# Patient Record
Sex: FEMALE | Race: WHITE | NOT HISPANIC OR LATINO | Employment: OTHER | URBAN - METROPOLITAN AREA
[De-identification: names, ages, dates, MRNs, and addresses within clinical notes are randomized per-mention and may not be internally consistent; named-entity substitution may affect disease eponyms.]

---

## 2024-07-04 ENCOUNTER — APPOINTMENT (OUTPATIENT)
Dept: RADIOLOGY | Facility: HOSPITAL | Age: 74
DRG: 963 | End: 2024-07-04
Payer: MEDICARE

## 2024-07-04 ENCOUNTER — HOSPITAL ENCOUNTER (INPATIENT)
Facility: HOSPITAL | Age: 74
DRG: 963 | End: 2024-07-04
Attending: SURGERY | Admitting: STUDENT IN AN ORGANIZED HEALTH CARE EDUCATION/TRAINING PROGRAM
Payer: MEDICARE
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
## 2024-07-04 DIAGNOSIS — I27.20 PULMONARY HTN (MULTI): ICD-10-CM

## 2024-07-04 DIAGNOSIS — S32.502A: ICD-10-CM

## 2024-07-04 DIAGNOSIS — I13.0 HYPERTENSIVE HEART AND CHRONIC KIDNEY DISEASE WITH HEART FAILURE AND STAGE 1 THROUGH STAGE 4 CHRONIC KIDNEY DISEASE, OR UNSPECIFIED CHRONIC KIDNEY DISEASE (MULTI): ICD-10-CM

## 2024-07-04 DIAGNOSIS — S06.5XAA SDH (SUBDURAL HEMATOMA) (MULTI): Primary | ICD-10-CM

## 2024-07-04 DIAGNOSIS — I50.9 HEART FAILURE, UNSPECIFIED HF CHRONICITY, UNSPECIFIED HEART FAILURE TYPE (MULTI): ICD-10-CM

## 2024-07-04 DIAGNOSIS — Z95.0 PACEMAKER: ICD-10-CM

## 2024-07-04 DIAGNOSIS — R58: ICD-10-CM

## 2024-07-04 DIAGNOSIS — R60.0 LOWER LEG EDEMA: ICD-10-CM

## 2024-07-04 LAB
ALBUMIN SERPL BCP-MCNC: 3.3 G/DL (ref 3.4–5)
ALP SERPL-CCNC: 61 U/L (ref 33–136)
ALT SERPL W P-5'-P-CCNC: 12 U/L (ref 7–45)
ANION GAP BLDV CALCULATED.4IONS-SCNC: 16 MMOL/L (ref 10–25)
ANION GAP SERPL CALC-SCNC: 17 MMOL/L (ref 10–20)
AST SERPL W P-5'-P-CCNC: 13 U/L (ref 9–39)
BASE EXCESS BLDV CALC-SCNC: -3.9 MMOL/L (ref -2–3)
BASOPHILS # BLD AUTO: 0.05 X10*3/UL (ref 0–0.1)
BASOPHILS NFR BLD AUTO: 0.2 %
BILIRUB DIRECT SERPL-MCNC: 0.4 MG/DL (ref 0–0.3)
BILIRUB SERPL-MCNC: 1.3 MG/DL (ref 0–1.2)
BODY TEMPERATURE: 37 DEGREES CELSIUS
BUN SERPL-MCNC: 30 MG/DL (ref 6–23)
CA-I BLDV-SCNC: 1.17 MMOL/L (ref 1.1–1.33)
CALCIUM SERPL-MCNC: 8.6 MG/DL (ref 8.6–10.6)
CFT FORM KAOLIN IND BLD RES TEG: 1.7 MIN (ref 0.8–2.1)
CHLORIDE BLDV-SCNC: 103 MMOL/L (ref 98–107)
CHLORIDE SERPL-SCNC: 104 MMOL/L (ref 98–107)
CK SERPL-CCNC: 20 U/L (ref 0–215)
CLOT ANGLE.KAOLIN INDUCED BLD RES TEG: 70 DEG (ref 63–78)
CLOT INIT KAO IND P HEP NEUT BLD RES TEG: 7.7 MIN (ref 4.3–8.3)
CLOT INIT KAO IND P HEP NEUT BLD RES TEG: 8.9 MIN (ref 4.6–9.1)
CO2 SERPL-SCNC: 20 MMOL/L (ref 21–32)
CREAT SERPL-MCNC: 1.59 MG/DL (ref 0.5–1.05)
EGFRCR SERPLBLD CKD-EPI 2021: 34 ML/MIN/1.73M*2
EOSINOPHIL # BLD AUTO: 0.11 X10*3/UL (ref 0–0.4)
EOSINOPHIL NFR BLD AUTO: 0.5 %
ERYTHROCYTE [DISTWIDTH] IN BLOOD BY AUTOMATED COUNT: 21.2 % (ref 11.5–14.5)
ERYTHROCYTE [DISTWIDTH] IN BLOOD BY AUTOMATED COUNT: 21.2 % (ref 11.5–14.5)
ERYTHROCYTE [DISTWIDTH] IN BLOOD BY AUTOMATED COUNT: 21.3 % (ref 11.5–14.5)
FIBRINOGEN BLD CALC-MCNC: 389 MG/DL (ref 278–581)
GLUCOSE BLD MANUAL STRIP-MCNC: 101 MG/DL (ref 74–99)
GLUCOSE BLD MANUAL STRIP-MCNC: 97 MG/DL (ref 74–99)
GLUCOSE BLDV-MCNC: 103 MG/DL (ref 74–99)
GLUCOSE SERPL-MCNC: 91 MG/DL (ref 74–99)
HCO3 BLDV-SCNC: 21.6 MMOL/L (ref 22–26)
HCT VFR BLD AUTO: 30 % (ref 36–46)
HCT VFR BLD AUTO: 30 % (ref 36–46)
HCT VFR BLD AUTO: 32.8 % (ref 36–46)
HCT VFR BLD EST: 33 % (ref 36–46)
HGB BLD-MCNC: 10.3 G/DL (ref 12–16)
HGB BLD-MCNC: 9.1 G/DL (ref 12–16)
HGB BLD-MCNC: 9.2 G/DL (ref 12–16)
HGB BLDV-MCNC: 11.1 G/DL (ref 12–16)
HOLD SPECIMEN: NORMAL
IMM GRANULOCYTES # BLD AUTO: 0.28 X10*3/UL (ref 0–0.5)
IMM GRANULOCYTES NFR BLD AUTO: 1.3 % (ref 0–0.9)
INR PPP: 1.7 (ref 0.9–1.1)
LACTATE BLDV-SCNC: 2.2 MMOL/L (ref 0.4–2)
LYMPHOCYTES # BLD AUTO: 1.34 X10*3/UL (ref 0.8–3)
LYMPHOCYTES NFR BLD AUTO: 6.1 %
MA KAOLIN BLD RES TEG: 63 MM (ref 52–69)
MA KAOLIN+TF BLD RES TEG: 64 MM (ref 52–70)
MA TF IND+IIB-IIIA INH BLD RES TEG: 21 MM (ref 15–32)
MCH RBC QN AUTO: 22.9 PG (ref 26–34)
MCH RBC QN AUTO: 23.5 PG (ref 26–34)
MCH RBC QN AUTO: 23.7 PG (ref 26–34)
MCHC RBC AUTO-ENTMCNC: 30.3 G/DL (ref 32–36)
MCHC RBC AUTO-ENTMCNC: 30.7 G/DL (ref 32–36)
MCHC RBC AUTO-ENTMCNC: 31.4 G/DL (ref 32–36)
MCV RBC AUTO: 73 FL (ref 80–100)
MCV RBC AUTO: 77 FL (ref 80–100)
MCV RBC AUTO: 78 FL (ref 80–100)
MONOCYTES # BLD AUTO: 1.3 X10*3/UL (ref 0.05–0.8)
MONOCYTES NFR BLD AUTO: 5.9 %
NEUTROPHILS # BLD AUTO: 18.89 X10*3/UL (ref 1.6–5.5)
NEUTROPHILS NFR BLD AUTO: 86 %
NRBC BLD-RTO: 0.3 /100 WBCS (ref 0–0)
OVALOCYTES BLD QL SMEAR: NORMAL
OXYHGB MFR BLDV: 32.9 % (ref 45–75)
PCO2 BLDV: 40 MM HG (ref 41–51)
PH BLDV: 7.34 PH (ref 7.33–7.43)
PLATELET # BLD AUTO: 168 X10*3/UL (ref 150–450)
PLATELET # BLD AUTO: 179 X10*3/UL (ref 150–450)
PLATELET # BLD AUTO: 229 X10*3/UL (ref 150–450)
PO2 BLDV: 25 MM HG (ref 35–45)
POTASSIUM BLDV-SCNC: 5.3 MMOL/L (ref 3.5–5.3)
POTASSIUM SERPL-SCNC: 4.9 MMOL/L (ref 3.5–5.3)
PROT SERPL-MCNC: 5.8 G/DL (ref 6.4–8.2)
PROTHROMBIN TIME: 19.8 SECONDS (ref 9.8–12.8)
RBC # BLD AUTO: 3.87 X10*6/UL (ref 4–5.2)
RBC # BLD AUTO: 3.89 X10*6/UL (ref 4–5.2)
RBC # BLD AUTO: 4.49 X10*6/UL (ref 4–5.2)
RBC MORPH BLD: NORMAL
SAO2 % BLDV: 33 % (ref 45–75)
SODIUM BLDV-SCNC: 135 MMOL/L (ref 136–145)
SODIUM SERPL-SCNC: 136 MMOL/L (ref 136–145)
WBC # BLD AUTO: 14.6 X10*3/UL (ref 4.4–11.3)
WBC # BLD AUTO: 15.3 X10*3/UL (ref 4.4–11.3)
WBC # BLD AUTO: 22 X10*3/UL (ref 4.4–11.3)

## 2024-07-04 PROCEDURE — 82947 ASSAY GLUCOSE BLOOD QUANT: CPT

## 2024-07-04 PROCEDURE — 99291 CRITICAL CARE FIRST HOUR: CPT | Performed by: STUDENT IN AN ORGANIZED HEALTH CARE EDUCATION/TRAINING PROGRAM

## 2024-07-04 PROCEDURE — 72190 X-RAY EXAM OF PELVIS: CPT

## 2024-07-04 PROCEDURE — 99223 1ST HOSP IP/OBS HIGH 75: CPT | Performed by: SURGERY

## 2024-07-04 PROCEDURE — 36600 WITHDRAWAL OF ARTERIAL BLOOD: CPT

## 2024-07-04 PROCEDURE — 85610 PROTHROMBIN TIME: CPT | Performed by: STUDENT IN AN ORGANIZED HEALTH CARE EDUCATION/TRAINING PROGRAM

## 2024-07-04 PROCEDURE — 73560 X-RAY EXAM OF KNEE 1 OR 2: CPT | Mod: RT

## 2024-07-04 PROCEDURE — 84075 ASSAY ALKALINE PHOSPHATASE: CPT | Performed by: STUDENT IN AN ORGANIZED HEALTH CARE EDUCATION/TRAINING PROGRAM

## 2024-07-04 PROCEDURE — G0390 TRAUMA RESPONS W/HOSP CRITI: HCPCS

## 2024-07-04 PROCEDURE — 2500000005 HC RX 250 GENERAL PHARMACY W/O HCPCS

## 2024-07-04 PROCEDURE — 73030 X-RAY EXAM OF SHOULDER: CPT | Mod: LT

## 2024-07-04 PROCEDURE — 3E033XZ INTRODUCTION OF VASOPRESSOR INTO PERIPHERAL VEIN, PERCUTANEOUS APPROACH: ICD-10-PCS | Performed by: HOSPITALIST

## 2024-07-04 PROCEDURE — 2020000001 HC ICU ROOM DAILY

## 2024-07-04 PROCEDURE — 85025 COMPLETE CBC W/AUTO DIFF WBC: CPT | Performed by: STUDENT IN AN ORGANIZED HEALTH CARE EDUCATION/TRAINING PROGRAM

## 2024-07-04 PROCEDURE — 2500000002 HC RX 250 W HCPCS SELF ADMINISTERED DRUGS (ALT 637 FOR MEDICARE OP, ALT 636 FOR OP/ED): Performed by: INTERNAL MEDICINE

## 2024-07-04 PROCEDURE — 70450 CT HEAD/BRAIN W/O DYE: CPT | Performed by: STUDENT IN AN ORGANIZED HEALTH CARE EDUCATION/TRAINING PROGRAM

## 2024-07-04 PROCEDURE — 72190 X-RAY EXAM OF PELVIS: CPT | Performed by: RADIOLOGY

## 2024-07-04 PROCEDURE — 82550 ASSAY OF CK (CPK): CPT | Performed by: STUDENT IN AN ORGANIZED HEALTH CARE EDUCATION/TRAINING PROGRAM

## 2024-07-04 PROCEDURE — 71045 X-RAY EXAM CHEST 1 VIEW: CPT

## 2024-07-04 PROCEDURE — 73610 X-RAY EXAM OF ANKLE: CPT | Mod: RIGHT SIDE | Performed by: RADIOLOGY

## 2024-07-04 PROCEDURE — 99223 1ST HOSP IP/OBS HIGH 75: CPT | Performed by: NEUROLOGICAL SURGERY

## 2024-07-04 PROCEDURE — 74174 CTA ABD&PLVS W/CONTRAST: CPT | Performed by: RADIOLOGY

## 2024-07-04 PROCEDURE — 84132 ASSAY OF SERUM POTASSIUM: CPT

## 2024-07-04 PROCEDURE — 2500000004 HC RX 250 GENERAL PHARMACY W/ HCPCS (ALT 636 FOR OP/ED)

## 2024-07-04 PROCEDURE — 36415 COLL VENOUS BLD VENIPUNCTURE: CPT

## 2024-07-04 PROCEDURE — 74174 CTA ABD&PLVS W/CONTRAST: CPT

## 2024-07-04 PROCEDURE — 72170 X-RAY EXAM OF PELVIS: CPT | Performed by: RADIOLOGY

## 2024-07-04 PROCEDURE — 85027 COMPLETE CBC AUTOMATED: CPT

## 2024-07-04 PROCEDURE — 5A0955A ASSISTANCE WITH RESPIRATORY VENTILATION, GREATER THAN 96 CONSECUTIVE HOURS, HIGH NASAL FLOW/VELOCITY: ICD-10-PCS | Performed by: HOSPITALIST

## 2024-07-04 PROCEDURE — 85384 FIBRINOGEN ACTIVITY: CPT

## 2024-07-04 PROCEDURE — 6360000002 HC RX 636 FACTOR: Mod: JZ

## 2024-07-04 PROCEDURE — 2500000004 HC RX 250 GENERAL PHARMACY W/ HCPCS (ALT 636 FOR OP/ED): Performed by: STUDENT IN AN ORGANIZED HEALTH CARE EDUCATION/TRAINING PROGRAM

## 2024-07-04 PROCEDURE — 71045 X-RAY EXAM CHEST 1 VIEW: CPT | Performed by: RADIOLOGY

## 2024-07-04 PROCEDURE — 36415 COLL VENOUS BLD VENIPUNCTURE: CPT | Performed by: STUDENT IN AN ORGANIZED HEALTH CARE EDUCATION/TRAINING PROGRAM

## 2024-07-04 PROCEDURE — 72170 X-RAY EXAM OF PELVIS: CPT

## 2024-07-04 PROCEDURE — 73610 X-RAY EXAM OF ANKLE: CPT | Mod: LT

## 2024-07-04 PROCEDURE — 2550000001 HC RX 255 CONTRASTS: Performed by: SURGERY

## 2024-07-04 PROCEDURE — 82248 BILIRUBIN DIRECT: CPT | Performed by: STUDENT IN AN ORGANIZED HEALTH CARE EDUCATION/TRAINING PROGRAM

## 2024-07-04 PROCEDURE — 70450 CT HEAD/BRAIN W/O DYE: CPT

## 2024-07-04 PROCEDURE — 82805 BLOOD GASES W/O2 SATURATION: CPT

## 2024-07-04 PROCEDURE — 73560 X-RAY EXAM OF KNEE 1 OR 2: CPT | Mod: RIGHT SIDE | Performed by: RADIOLOGY

## 2024-07-04 PROCEDURE — 99291 CRITICAL CARE FIRST HOUR: CPT | Performed by: SURGERY

## 2024-07-04 PROCEDURE — 99222 1ST HOSP IP/OBS MODERATE 55: CPT

## 2024-07-04 PROCEDURE — 73030 X-RAY EXAM OF SHOULDER: CPT | Mod: LEFT SIDE | Performed by: RADIOLOGY

## 2024-07-04 PROCEDURE — 73610 X-RAY EXAM OF ANKLE: CPT | Mod: LEFT SIDE | Performed by: RADIOLOGY

## 2024-07-04 PROCEDURE — 2500000001 HC RX 250 WO HCPCS SELF ADMINISTERED DRUGS (ALT 637 FOR MEDICARE OP)

## 2024-07-04 PROCEDURE — 73610 X-RAY EXAM OF ANKLE: CPT | Mod: RT

## 2024-07-04 PROCEDURE — 2500000005 HC RX 250 GENERAL PHARMACY W/O HCPCS: Performed by: STUDENT IN AN ORGANIZED HEALTH CARE EDUCATION/TRAINING PROGRAM

## 2024-07-04 RX ORDER — ONDANSETRON HYDROCHLORIDE 2 MG/ML
4 INJECTION, SOLUTION INTRAVENOUS EVERY 8 HOURS PRN
Status: DISCONTINUED | OUTPATIENT
Start: 2024-07-04 | End: 2024-07-17 | Stop reason: HOSPADM

## 2024-07-04 RX ORDER — LAMOTRIGINE 100 MG/1
100 TABLET ORAL DAILY
COMMUNITY

## 2024-07-04 RX ORDER — SILDENAFIL CITRATE 20 MG/1
10 TABLET ORAL 3 TIMES DAILY
Status: DISCONTINUED | OUTPATIENT
Start: 2024-07-04 | End: 2024-07-04

## 2024-07-04 RX ORDER — ALLOPURINOL 300 MG/1
300 TABLET ORAL DAILY
Status: ON HOLD | COMMUNITY
End: 2024-07-17

## 2024-07-04 RX ORDER — OXYCODONE HYDROCHLORIDE 5 MG/1
5 TABLET ORAL EVERY 4 HOURS PRN
Status: DISCONTINUED | OUTPATIENT
Start: 2024-07-04 | End: 2024-07-05

## 2024-07-04 RX ORDER — TORSEMIDE 20 MG/1
40 TABLET ORAL 2 TIMES DAILY
COMMUNITY
End: 2024-07-17 | Stop reason: HOSPADM

## 2024-07-04 RX ORDER — SILDENAFIL CITRATE 20 MG/1
20 TABLET ORAL 3 TIMES DAILY
COMMUNITY
End: 2024-07-17 | Stop reason: HOSPADM

## 2024-07-04 RX ORDER — LEVETIRACETAM 500 MG/1
500 TABLET ORAL 2 TIMES DAILY
Status: DISCONTINUED | OUTPATIENT
Start: 2024-07-04 | End: 2024-07-17 | Stop reason: HOSPADM

## 2024-07-04 RX ORDER — CALCIUM CARBONATE 300MG(750)
800 TABLET,CHEWABLE ORAL 2 TIMES DAILY
COMMUNITY

## 2024-07-04 RX ORDER — PANTOPRAZOLE SODIUM 40 MG/1
40 TABLET, DELAYED RELEASE ORAL
Status: DISCONTINUED | OUTPATIENT
Start: 2024-07-04 | End: 2024-07-17 | Stop reason: HOSPADM

## 2024-07-04 RX ORDER — ALPRAZOLAM 0.5 MG/1
0.5 TABLET ORAL DAILY PRN
COMMUNITY

## 2024-07-04 RX ORDER — PREDNISONE 10 MG/1
10 TABLET ORAL DAILY
COMMUNITY

## 2024-07-04 RX ORDER — LOPERAMIDE HYDROCHLORIDE 2 MG/1
4 CAPSULE ORAL 3 TIMES DAILY
COMMUNITY

## 2024-07-04 RX ORDER — ESCITALOPRAM OXALATE 10 MG/1
10 TABLET ORAL DAILY
COMMUNITY

## 2024-07-04 RX ORDER — BISACODYL 5 MG
10 TABLET, DELAYED RELEASE (ENTERIC COATED) ORAL DAILY PRN
Status: DISCONTINUED | OUTPATIENT
Start: 2024-07-04 | End: 2024-07-04

## 2024-07-04 RX ORDER — AZELASTINE 1 MG/ML
1 SPRAY, METERED NASAL 2 TIMES DAILY
COMMUNITY

## 2024-07-04 RX ORDER — HYDROMORPHONE HYDROCHLORIDE 1 MG/ML
0.2 INJECTION, SOLUTION INTRAMUSCULAR; INTRAVENOUS; SUBCUTANEOUS
Status: DISCONTINUED | OUTPATIENT
Start: 2024-07-04 | End: 2024-07-05

## 2024-07-04 RX ORDER — SODIUM CHLORIDE, SODIUM LACTATE, POTASSIUM CHLORIDE, CALCIUM CHLORIDE 600; 310; 30; 20 MG/100ML; MG/100ML; MG/100ML; MG/100ML
75 INJECTION, SOLUTION INTRAVENOUS CONTINUOUS
Status: DISCONTINUED | OUTPATIENT
Start: 2024-07-04 | End: 2024-07-05

## 2024-07-04 RX ORDER — ACETAMINOPHEN 325 MG/1
650 TABLET ORAL EVERY 6 HOURS
Status: DISCONTINUED | OUTPATIENT
Start: 2024-07-04 | End: 2024-07-05

## 2024-07-04 RX ORDER — BUSPIRONE HYDROCHLORIDE 5 MG/1
5 TABLET ORAL 2 TIMES DAILY
COMMUNITY

## 2024-07-04 RX ORDER — PREGABALIN 75 MG/1
75 CAPSULE ORAL 2 TIMES DAILY
COMMUNITY

## 2024-07-04 RX ORDER — POTASSIUM CHLORIDE 750 MG/1
40 TABLET, FILM COATED, EXTENDED RELEASE ORAL 3 TIMES DAILY
COMMUNITY

## 2024-07-04 RX ORDER — ONDANSETRON 4 MG/1
4 TABLET, FILM COATED ORAL EVERY 8 HOURS PRN
Status: DISCONTINUED | OUTPATIENT
Start: 2024-07-04 | End: 2024-07-17 | Stop reason: HOSPADM

## 2024-07-04 RX ORDER — POLYETHYLENE GLYCOL 3350 17 G/17G
17 POWDER, FOR SOLUTION ORAL DAILY
Status: DISCONTINUED | OUTPATIENT
Start: 2024-07-04 | End: 2024-07-04

## 2024-07-04 RX ORDER — PANTOPRAZOLE SODIUM 40 MG/1
40 TABLET, DELAYED RELEASE ORAL 2 TIMES DAILY
COMMUNITY

## 2024-07-04 RX ORDER — OXYCODONE HYDROCHLORIDE 5 MG/1
10 TABLET ORAL EVERY 4 HOURS PRN
Status: DISCONTINUED | OUTPATIENT
Start: 2024-07-04 | End: 2024-07-05

## 2024-07-04 RX ORDER — SILDENAFIL CITRATE 20 MG/1
20 TABLET ORAL 3 TIMES DAILY
Status: DISCONTINUED | OUTPATIENT
Start: 2024-07-04 | End: 2024-07-08

## 2024-07-04 RX ORDER — METHOCARBAMOL 500 MG/1
500 TABLET, FILM COATED ORAL 2 TIMES DAILY PRN
COMMUNITY

## 2024-07-04 RX ADMIN — WATER 4 MG/MIN: 1 INJECTION INTRAMUSCULAR; INTRAVENOUS; SUBCUTANEOUS at 22:43

## 2024-07-04 RX ADMIN — ACETAMINOPHEN 650 MG: 325 TABLET ORAL at 11:58

## 2024-07-04 RX ADMIN — Medication 6 L/MIN: at 12:18

## 2024-07-04 RX ADMIN — PANTOPRAZOLE SODIUM 40 MG: 40 TABLET, DELAYED RELEASE ORAL at 11:58

## 2024-07-04 RX ADMIN — OXYCODONE HYDROCHLORIDE 5 MG: 5 TABLET ORAL at 21:58

## 2024-07-04 RX ADMIN — ACETAMINOPHEN 650 MG: 325 TABLET ORAL at 23:52

## 2024-07-04 RX ADMIN — ONDANSETRON 4 MG: 2 INJECTION INTRAMUSCULAR; INTRAVENOUS at 20:46

## 2024-07-04 RX ADMIN — SILDENAFIL 20 MG: 20 TABLET ORAL at 16:20

## 2024-07-04 RX ADMIN — SODIUM CHLORIDE, POTASSIUM CHLORIDE, SODIUM LACTATE AND CALCIUM CHLORIDE 75 ML/HR: 600; 310; 30; 20 INJECTION, SOLUTION INTRAVENOUS at 18:19

## 2024-07-04 RX ADMIN — IOHEXOL 90 ML: 350 INJECTION, SOLUTION INTRAVENOUS at 10:35

## 2024-07-04 RX ADMIN — OXYCODONE HYDROCHLORIDE 10 MG: 5 TABLET ORAL at 13:52

## 2024-07-04 RX ADMIN — LEVETIRACETAM 500 MG: 500 TABLET, FILM COATED ORAL at 21:41

## 2024-07-04 RX ADMIN — ACETAMINOPHEN 650 MG: 325 TABLET ORAL at 18:27

## 2024-07-04 RX ADMIN — SILDENAFIL 20 MG: 20 TABLET ORAL at 20:49

## 2024-07-04 ASSESSMENT — PAIN SCALES - GENERAL
PAINLEVEL_OUTOF10: 6
PAINLEVEL_OUTOF10: 4
PAINLEVEL_OUTOF10: 0 - NO PAIN
PAINLEVEL_OUTOF10: 0 - NO PAIN
PAINLEVEL_OUTOF10: 1
PAINLEVEL_OUTOF10: 8
PAINLEVEL_OUTOF10: 5 - MODERATE PAIN

## 2024-07-04 ASSESSMENT — PAIN - FUNCTIONAL ASSESSMENT
PAIN_FUNCTIONAL_ASSESSMENT: 0-10

## 2024-07-04 ASSESSMENT — COLUMBIA-SUICIDE SEVERITY RATING SCALE - C-SSRS
2. HAVE YOU ACTUALLY HAD ANY THOUGHTS OF KILLING YOURSELF?: NO
1. IN THE PAST MONTH, HAVE YOU WISHED YOU WERE DEAD OR WISHED YOU COULD GO TO SLEEP AND NOT WAKE UP?: NO
6. HAVE YOU EVER DONE ANYTHING, STARTED TO DO ANYTHING, OR PREPARED TO DO ANYTHING TO END YOUR LIFE?: NO

## 2024-07-04 ASSESSMENT — PAIN SCALES - PAIN ASSESSMENT IN ADVANCED DEMENTIA (PAINAD): TOTALSCORE: MEDICATION (SEE MAR)

## 2024-07-04 ASSESSMENT — PAIN DESCRIPTION - LOCATION
LOCATION: HIP
LOCATION: HIP

## 2024-07-04 ASSESSMENT — PAIN DESCRIPTION - ORIENTATION
ORIENTATION: LEFT
ORIENTATION: LEFT

## 2024-07-04 NOTE — PROGRESS NOTES
Transfer via  transport from [facility] for fall. Multiple fxs. Patient fell on 330a going to the bathroom. Down for one hour before being able to reach the phone to call her friend. Aox3 upon arrival. Patient taken to CT scan and xray. SW will continue to follow for assistance with discharge planning needs.

## 2024-07-04 NOTE — PROGRESS NOTES
[facility]  TRAUMA ICU - PROGRESS NOTE    Patient Name: Bhargavi Isidro  MRN: [id number]  Admit Date: 704  : 1950                      AGE: 74 y.o.                             GENDER: female  ==============================================================================  MECHANISM OF INJURY:  Fall    LOC (yes/no)? no  Anticoagulant / Anti-platelet Rx? (for what dx?): Eliquis for paroxysmal aFib, reversed with feiba at OSH  Referring Facility Name (N/A for scene EMR run): [facility]     INJURIES:   SDH, right  L inferior pubic rami fracture     OTHER MEDICAL PROBLEMS:  Severe pulmonary HTN - on remodulin pump  CAD, underwent CABG  Paroxysmal Atrial fibrillation  COPD     INCIDENTAL FINDINGS:  Aortoiliac calcifications    PROCEDURES  None      ==============================================================================  TODAY'S ASSESSMENT AND PLAN OF CARE:  Bhargavi Isidro is a 74 y.o. female in the ICU due to respiratory support and neurological monitoring.     *Patient is refusing IV medications    NEURO/PAIN/SEDATION:    - Tylenol 650 mg PO every 6h scheduled    - Oxycodone PO PRN    - Dilaudid 0.2mg q3h PRN for breakthrough pain    - Neurosurgery consulted       - repeat CTH pending        - Andexxa for Eliquis reversal        - follow up TEG   - q1h neurochecks     RESPIRATORY:   #Pulmonary hypertension    - Sildenafil 20 mg PO TID   - Patient refusing Remodulin IV   - SpO2 goal >90%  - IS, q1h     CARDIOVASC:    - MAP goal > 60    #History of Afib on Eliquis   - Eliquis held on admission     GI:     - diet: NPO, sips with meds    - Zofran PRN for N/V    :    - Strict monitor of I/O   - martinez in place     HEMATOLOGIC:    - CBC q6h     ENDOCRINE:     - POCT glucose q6h     FEN   - LR 75mL/hr   - daily RFP and Mag   - replete electrolytes as needed     MUSCULOSKELETAL/SKIN:   #Periorbital ecchymosis from fall   - neurochecks q1h    - continue to monitor for vision  "changes    #Pubic rami fractures (superior and inferior)   - Ortho consulted, recommendations appreciated    - IR consulted with no acute intervention recommended     INFECTIOUS DISEASE:    - afebrile   #Incidental Diverticulitis with abscess managed with Vancomycin    - Vancomycin discontinued on admission     GI PROPHYLAXIS: Pantoprazole     DVT PROPHYLAXIS: SCDs, holding chemoprophylaxis in setting of bleed    DISPOSITION: remain in the TSICU     Patient discussed with Dr. Keesha Rucker PGY1  TSICU [id number]    ==============================================================================    CHIEF COMPLAINT/OVERNIGHT EVENTS/HPI  Please see trauma HPI for full details. Patient refuses IV medications. When arrived in the TSICU endorsed pain. O2 sats since being in the TSICU high 80s to low 90s.     MEDICAL HISTORY/ROS  Admission history and ROS reviewed. Pertinent changes as follows:   Please see trauma HPI for full details     PHYSICAL EXAM:  Heart Rate:  [71]   Temp:  [36.2 °C (97.2 °F)-36.9 °C (98.4 °F)]   Resp:  [19-23]   BP: ()/(48-71)   Height:  [165.1 cm (5' 5\")]   Weight:  [63 kg (139 lb)]   SpO2:  [79 %-94 %]     Physical Exam  Constitutional:       General: She is not in acute distress.  HENT:      Head:      Comments: Left sided ecchymosis (periorbital)   Cardiovascular:      Rate and Rhythm: Normal rate and regular rhythm.   Pulmonary:      Effort: Pulmonary effort is normal.      Breath sounds: Normal breath sounds.   Abdominal:      General: There is no distension.      Palpations: Abdomen is soft.      Tenderness: There is no abdominal tenderness.   Musculoskeletal:      Cervical back: Normal range of motion. No rigidity.      Comments: Able to move bilateral upper and lower extremities   Skin:     Findings: Bruising present.      Comments: Laceration L upper arm   Neurological:      Mental Status: She is alert and oriented to person, place, and time.      Comments: Follows " commands - wiggles toes and squeezes fingers bilaterally       IMAGING SUMMARY:    CXR: pulmonary edema present without consolidations, cardiac silhouette is enlarged   XR pelvis: L superior and inferior pubic rami without dislocation   CTA AP: hematoma without contrast extravasation in the Space of Retzius, thickened bladder wall ?hematoma, fluid collection inferior abdomen near sigmoid diverticulosis - possible abscess, pubic rami fracture (superior and inferior on the left), transverse process fractures of L1 and L2 on the right, brit sacral fracture on the left zone 1.  XR ankle L: no fracture or dislocation  XR ankle R: no acute findings   XR pelvis: L superior and inferior pubic rami without dislocation   XR shoulder: no acute findings   XR knee: no acute findings    CT head: right sided subdural hematoma 1.1 cm     LABS:  Results from last 7 days   Lab Units 07/04/24  0958   WBC AUTO x10*3/uL 22.0*   HEMOGLOBIN g/dL 10.3*   HEMATOCRIT % 32.8*   PLATELETS AUTO x10*3/uL 229   NEUTROS PCT AUTO % 86.0   LYMPHS PCT AUTO % 6.1   MONOS PCT AUTO % 5.9   EOS PCT AUTO % 0.5     Results from last 7 days   Lab Units 07/04/24  0958   INR  1.7*     Results from last 7 days   Lab Units 07/04/24  0958   SODIUM mmol/L 136   POTASSIUM mmol/L 4.9   CHLORIDE mmol/L 104   CO2 mmol/L 20*   BUN mg/dL 30*   CREATININE mg/dL 1.59*   CALCIUM mg/dL 8.6   PROTEIN TOTAL g/dL 5.8*   BILIRUBIN TOTAL mg/dL 1.3*   ALK PHOS U/L 61   ALT U/L 12   AST U/L 13   GLUCOSE mg/dL 91     Results from last 7 days   Lab Units 07/04/24  0958   BILIRUBIN TOTAL mg/dL 1.3*   BILIRUBIN DIRECT mg/dL 0.4*         I have reviewed all medications, laboratory results, and imaging pertinent for today's encounter.

## 2024-07-04 NOTE — ED TRIAGE NOTES
Full trauma transfer from [place]. Fall around 0330 as she was trying to go to the restroom. Pt is currently on Warfrin and has a Remodulin pump. CT shows a subdural and pelvic fx. Pt received 1 unit of RBC at outside facility due to low BP

## 2024-07-04 NOTE — SIGNIFICANT EVENT
Interventional Radiology Clinical Event Note:    IR received message RE patient Bhargavi Isidro around 10:45 on 07/04/2024. Briefly, 75yo F presented to ED as transfer from [facility] as full trauma found to have SDH and left superior and inferior pubic rami fractures and left sacral fracture with concern for possible pelvic bleed. IR contacted for consideration of embolization.    CTA images reviewed by Aguila Carrillo and Aiden, with low suspicion for active contrast extravasation in the pelvis. As the patient is hemodynamically stable without significant anemia or concerning imaging findings, intervention with embolization is not currently indicated. If there are any significant changes in the patient's clinical status, acute drop in Hb, or other signs of active hemorrhage, please page IR and will re-assess.      Malissa Hawkins MD PGY-3  Interventional Radiology  Pager [phone] or Epic Secure Chat

## 2024-07-04 NOTE — H&P
[facility]  TRAUMA SERVICE - HISTORY AND PHYSICAL / CONSULT    Patient Name: Bhargavi Isidro  MRN: [id number]  Admit Date: 704  : 1950  AGE: 74 y.o.   GENDER: female  ==============================================================================  MECHANISM OF INJURY / CHIEF COMPLAINT:   Bhargavi Isidro is a 74F whoo is coming in as a full trauma, was found down around 3am after an unwitnessed fall. Pt denies any syncopal episodes prior to the fall. Pt was then taken to [facility] where her systolic BP was in the 80s (which is close to her baseline) and she was given 1 unit pRBCs. She was also started on vancomycin upon arrival, tetanus status is unknown. Patient has remained GCS of 15 while in the trauma bay.   LOC (yes/no?): Denies  Anticoagulant / Anti-platelet Rx? (for what dx?): Xarelto for paroxysmal aFib, reversed with feiba at OSH  Referring Facility Name (N/A for scene EMR run): [facility]    INJURIES:   RSDH  L inferior pubic rami fracture    OTHER MEDICAL PROBLEMS:  Severe pulmonary HTN - on remodulin pump  CAD, underwent CABG  Paroxysmal Atrial fibrillation  COPD    INCIDENTAL FINDINGS:  Aortoiliac calcifications    ==============================================================================  ADMISSION PLAN OF CARE:  TICU for q1h neuro checks  CTH Stat per NSGY since outside images are unavailable currently  Per MICU: Keep patient on 5-6L oxygen (baseline) and keep O2Sats above 90 to prevent hypercapnia.  Consultants notified (specialty, provider name, time): IR (upon arrival), NSGY (11 AM)  Medication management per TICU team  Appreciate IR reccs.    ==============================================================================  PAST MEDICAL HISTORY:   PMH: CAD (underwent CABG), paroxysmal Afib (on xarelto at home), COPD, severe pulmonary hypertension (on Remodulin pump, MICU and PH team is following), and RSDH found on CTH at OSH. Pt has a pacemaker in place  for complete heart block.  History reviewed. No pertinent past medical history.      PSH: CABG  History reviewed. No pertinent surgical history.  FH:   No family history on file.  SOCIAL HISTORY:    Smoking: Unknown  Social History     Tobacco Use   Smoking Status Not on file   Smokeless Tobacco Not on file       Alcohol: Not asked  Social History     Substance and Sexual Activity   Alcohol Use None       Drug use: Not asked    MEDICATIONS: Xarelto, [place] pump, Torsemide (40 BID, patient reports usually taking it once daily), Sildenafil 20mg TID  Prior to Admission medications    Not on File     ALLERGIES:   Allergies   Allergen Reactions    Albuterol Unknown     a-fib    Pt. Given albuterol had bigeminy PVC's and V-Tach.    Dofetilide Unknown     Torsades    Statins-Hmg-Coa Reductase Inhibitors Unknown     leg cramps with atorvastatin, Crestor, Zocor    Sulfa (Sulfonamide Antibiotics) Unknown     Pt stated she does not think she has an allergy to sulfa drugs       REVIEW OF SYSTEMS:  Review of Systems  PHYSICAL EXAM:  PRIMARY SURVEY:  Airway  Airway is patent.     Breathing  Breathing is normal. Right breath sounds are normal. Left breath sounds are normal.     Circulation  Rate is regular.   Pulses  Radial: 2+ on the right; 2+ on the left.  Femoral: 2+ on the right; 2+ on the left.  Pedal: 2+ on the right; 2+ on the left.  Carotid: on the right; 2+ on the left.    Disability  [place] Coma Score  Eye:4   Verbal:5   Motor:6      15  Pupils  Right Pupil:   round and reactive        Left Pupil:   round and reactive           Motor Strength   strength:  5/5 on the right  5/5 on the left          Exam - eFAST  No fluid in the pericardial window    No fluid in the abdomen right upper quadrant.   Interventions:  FAST pelvis inconclusive    SECONDARY SURVEY/PHYSICAL EXAM:  Physical Exam  HENT:      Head: Abrasion present.      Comments: L forehead and cheek, periorbitally, superficial skin tear and ecchymosis.  Hemostatic.     Nose: No nasal deformity, septal deviation or laceration.   Neck:      Trachea: Trachea normal.   Cardiovascular:      Rate and Rhythm: Normal rate and regular rhythm.      Pulses:           Radial pulses are 2+ on the right side and 2+ on the left side.        Femoral pulses are 2+ on the right side and 2+ on the left side.       Dorsalis pedis pulses are 2+ on the right side and 2+ on the left side.        Posterior tibial pulses are 2+ on the right side and 2+ on the left side.   Pulmonary:      Effort: Pulmonary effort is normal.      Breath sounds: Normal breath sounds and air entry.   Chest:      Chest wall: No tenderness or crepitus.      Comments: Midline Anterior Chest Surgical Scar from prior CABG  Abdominal:      General: Abdomen is flat.      Palpations: Abdomen is soft.      Tenderness: There is abdominal tenderness in the left upper quadrant and left lower quadrant.   Musculoskeletal:      Left upper arm: Laceration present.      Comments: Ecchymosis present on bilateral R and L forearms, arms, and dorsum of hand.  Patient endorses they were present prior to her fall.   Feet:      Comments: Ecchymosis present on bilateral ankles and anterior shins.   Neurological:      Mental Status: She is alert.      GCS: GCS eye subscore is 4. GCS verbal subscore is 5. GCS motor subscore is 6.      Cranial Nerves: Cranial nerves 2-12 are intact.      Sensory: Sensation is intact.      Motor: Motor function is intact.   Psychiatric:         Attention and Perception: Attention and perception normal.         Behavior: Behavior normal. Behavior is cooperative.       IMAGING SUMMARY:  (summary of findings, not a copy of dictation)  CT Head/Face: CTH 6:00 am 7/4/24: Acute right convexity extra-axial hematoma which measures approximately 9   mm in maximum dimension and causes local mass effect but no midline   shift.  Although this has a somewhat convex appearance; this is still   overall somewhat favored  to reflect subdural hematoma with other   possibility being epidural hemorrhage.  Additionally, this has some   degree of internal low density which could reflect active bleeding.    Close imaging surveillance and neurologic exam is recommended.   - repeat head CTH  CT C-Spine: OSH read: Age-related degenerative changes which are most   significant at C3-4, C4-5 and C5-6 where lordosis and superimposed   calcified discogenic disease causes mild to moderate central canal   stenosis at C3-4 secondary to calcified central disc protrusion and   lordosis, mild to moderate possibly as much as moderate at C4-5 related   to listhesis and calcified left central protrusion, and mild to moderate   at C5-6 secondary to calcified disc bulging lordosis.   CT Chest/Abd/Pelvis: OSH: 1.  Chronic right rib fractures.  No acute rib fracture.   2.  Acute left obturator fractures with associated zone 1 left hemisacral   fracture.   3. Space of Retzius hematoma with active hemorrhage along the left   aspect of the hematoma.   4.  Left urinary bladder wall thickening, malignancy versus hematoma.    Correlate with direct visualization.   5.  Sigmoid colon diverticulitis with associated adjacent fluid   collection representing abscess.   -repeat CT C/A/P done upon arrival  CXR/PXR: Pelvic XR: no pubic symphisis widening, L inferior pubic rami fracture. CXR: pacemaker in place on left, Remodulin pump wires, no acute cardiopulmonary processes, pulmonary congestion present bilaterally, L>R.  Other(s): Ct Max Face (OSH):  No evidence of a remote fracture.  No lytic or blastic process   seen in the facial bones. L periorbital soft tissue swelling.   CT L Spine 7/4/24: Right IJ central venous line.  Left chest pacemaker.  Mild cardiomegaly.    Coronary artery calcifications.  Aortic valvular calcifications.    Surgical changes of CABG.  Intact aortic arch.  No mediastinal hematoma.    Patchy groundglass densities right upper lobe may be  infectious or   inflammatory.  No pneumothorax pleural effusion.  Multiple chronic right   rib fractures.  Median sternotomy wires.  No acute left rib fractures.     Multiple right lumbar transverse process fractures.  Acute left inferior   and superior pubic rami fractures.  Left hemisacral zone 1 fracture.    Aortoiliac calcifications.  Colonic diverticulosis.  Space of Retzius   hematoma with left aspect contrast blush coronal series 4 image 32.  Left   urinary bladder wall thickening 11 mm or hematoma.  There is sigmoid   colon fat inflammation.  Left pelvic rim-enhancing fluid collection 4.9 x   2.1 cm.     Chronic superior endplate of L2 vertebral body fracture.     LABS:  Results from last 7 days   Lab Units 07/04/24  0958   WBC AUTO x10*3/uL 22.0*   HEMOGLOBIN g/dL 10.3*   HEMATOCRIT % 32.8*   PLATELETS AUTO x10*3/uL 229     Results from last 7 days   Lab Units 07/04/24  0958   INR  1.7*     Results from last 7 days   Lab Units 07/04/24  0958   SODIUM mmol/L 136   POTASSIUM mmol/L 4.9   CHLORIDE mmol/L 104   CO2 mmol/L 20*   BUN mg/dL 30*   CREATININE mg/dL 1.59*   CALCIUM mg/dL 8.6   PROTEIN TOTAL g/dL 5.8*   BILIRUBIN TOTAL mg/dL 1.3*   ALK PHOS U/L 61   ALT U/L 12   AST U/L 13   GLUCOSE mg/dL 91     Results from last 7 days   Lab Units 07/04/24  0958   BILIRUBIN TOTAL mg/dL 1.3*   BILIRUBIN DIRECT mg/dL 0.4*           I have reviewed all laboratory and imaging results ordered/pertinent for this encounter.    I saw and evaluated the patient. I personally obtained the key and critical portions of the history and physical exam. I reviewed the resident’s documentation and discussed the patient with the resident. I agree with the resident’s medical decision making as documented in the resident’s note.    74F h/o CAD (CABG), pAF (xarelto), COPD, Pulm HTN (on continuous remodulin, 6LNC and with typical O2 sat in 80s) transferred after ground level fall with R SDH and pelvic fractures with pelvic hematoma. Feiba  for reversal. Pt briefly hypotensive at the referring hospital and given 1u pRBC. On our exam, A/B/Cs intact, HR 70s, SBP 120s, GCS 15. Abdomen soft and non-tender, pelvis stable. We had immediate access to the upper body imaging on disk but did not have access to the abdomen pelvis so this was repeated with delays and demonstrated left superior and inferior pubic rami fractures, left sacral fracture, L1 and 2 R TP fractures, and a pelvic hematoma without active extravasation. Ortho, NSG, and pulmonology consulting. Admit to ICU. Q6h cbcs for the pelvic hematoma, inlet and outlet films for the pelvic fractures, repeat CT head in 6h for TBI. She also has multiple areas of ecchymosis on the extremities which will require x-rays.    Juan Barreto MD  Trauma, Critical Care, and Acute Care Surgery  Pager: [phone]

## 2024-07-04 NOTE — ED PROCEDURE NOTE
Procedure  Critical Care    Performed by: Juan Jose Messina MD  Authorized by: Juan Jose Messina MD    Critical care provider statement:     Critical care time (minutes):  32  Comments:      Critical Care Time  Authorized and Performed by: Juan Jose Messina MD  Total critical care time: [32] minutes  Due to a high probability of clinically significant, life threatening deterioration, the patient required my highest level of preparedness to intervene emergently and I personally spent this critical care time directly and personally managing the patient. This critical care time included obtaining a history; examining the patient; pulse oximetry; ordering and review of studies; arranging urgent treatment with development of a management plan; evaluation of patient's response to treatment; frequent reassessment; and, discussions with other providers and patient/family.  This critical care time was performed to assess and manage the high probability of imminent, life-threatening deterioration that could result in multi-organ failure. It was exclusive of separately billable procedures and treating other patients and teaching time.  Please see MDM section and the rest of the note for further information on patient assessment and treatment.             Juan Jose Messina MD  07/04/24 2100

## 2024-07-04 NOTE — PROGRESS NOTES
Pharmacy Medication History Review    Bhargavi Isidro is a 74 y.o. female admitted for SDH (subdural hematoma) (Multi). Pharmacy reviewed the patient's [id number]-duzlfoxeq medications and allergies for accuracy.    The list below reflects the updated PTA list. Comments regarding how patient may be taking medications differently can be found in the Admit Orders Activity.  Prior to Admission Medications   Prescriptions Last Dose Informant Patient Reported?   ALPRAZolam (Xanax) 0.5 mg tablet Unknown Self, Other Yes   Sig: Take 1 tablet (0.5 mg) by mouth once daily as needed.   allopurinol (Zyloprim) 300 mg tablet 7/3/2024 Self, Other Yes   Sig: Take 1 tablet (300 mg) by mouth once daily.   apixaban (Eliquis) 5 mg tablet 7/3/2024 Self, Other Yes   Sig: Take 1 tablet (5 mg) by mouth 2 times a day.   azelastine (Astelin) 137 mcg (0.1 %) nasal spray Unknown Self, Other Yes   Sig: Administer 1 spray into each nostril 2 times a day. Use in each nostril as directed   escitalopram (Lexapro) 10 mg tablet 7/3/2024 Self, Other Yes   Sig: Take 1 tablet (10 mg) by mouth once daily.   lamoTRIgine (LaMICtal) 100 mg tablet 7/3/2024 Self, Other Yes   Sig: Take 1 tablet (100 mg) by mouth once daily.   loperamide (Imodium) 2 mg capsule Unknown Self, Other Yes   Sig: Take 2 capsules (4 mg) by mouth 3 times a day.   magnesium oxide (Mag-Ox) 400 mg tablet  Self, Other Yes   Sig: Take 2 tablets (800 mg) by mouth 2 times a day.   methocarbamol (Robaxin) 500 mg tablet Unknown Self, Other Yes   Sig: Take 1 tablet (500 mg) by mouth 2 times a day as needed for muscle spasms.   pantoprazole (ProtoNix) 40 mg EC tablet Unknown Self, Other Yes   Sig: Take 1 tablet (40 mg) by mouth 2 times a day. Do not crush, chew, or split.   potassium chloride CR 10 mEq ER tablet 7/3/2024 Self, Other Yes   Sig: Take 4 tablets (40 mEq) by mouth 3 times a day. Do not crush, chew, or split.   predniSONE (Deltasone) 10 mg tablet 7/3/2024 Self, Other Yes   Sig: Take 1  tablet (10 mg) by mouth once daily.   pregabalin (Lyrica) 75 mg capsule 7/3/2024  Yes   Sig: Take 1 capsule (75 mg) by mouth 2 times a day.   sildenafil (Revatio) 20 mg tablet 7/3/2024 Self, Other Yes   Sig: Take 1 tablet (20 mg) by mouth 3 times a day.   torsemide (Demadex) 20 mg tablet 7/3/2024 Self, Other Yes   Sig: Take 2 tablets (40 mg) by mouth 2 times a day.      Buspirone 5mg    Take 1 tablet by mouth 2 times a day        The list below reflects the updated allergy list. Please review each documented allergy for additional clarification and justification.  Allergies  Reviewed by Toshia Betancourt PharmD on 7/4/2024        Severity Reactions Comments    Albuterol High Unknown a-fib Pt. Given albuterol had bigeminy PVC's and V-Tach.    Dofetilide High Unknown Torsades    Statins-hmg-coa Reductase Inhibitors Not Specified Unknown leg cramps with atorvastatin, Crestor, Zocor    Sulfa (sulfonamide Antibiotics) Not Specified Unknown Pt stated she does not think she has an allergy to sulfa drugs            Patient accepts M2B at discharge. Pharmacy has been updated to [facility].    Sources used to complete the med history include   - Patient fill history  - [place]ARRS  - Patient interview (good/reliable historian)      Below are additional concerns with the patient's PTA list.  - None    Toshia Betancourt, PharmD  Transitions of Care Pharmacist  [facility]  Please reach out via Secure Chat for questions, or if no response call [employer] or [employer]

## 2024-07-04 NOTE — CONSULTS
"Inpatient consult to neurosurgery  Consult performed by: Opal Ríos MD  Consult ordered by: Juan Barreto MD        Reason For Consult  SDH    History Of Present Illness  Bhargavi Isidro is a 74 y.o. female with h/o HTN, HLD, CAD , STEMI s/p CABG, diastolic heart failure, congenital heart block s/p pacemeaker, PAH, Afib ((on eliquis, s/p reversal at OSH), CKD stage IV, chronc hypoxic resp failure, COPD (on home 5L NC), LEONEL, OA, chronic spine degen, GERD, p/w fall, CTH SDH     Patient stated that she fell around midnight last night.  Was on Eliquis, last dose 7/3 PM.  Denies headache, change in vision, nausea, vomiting.  Endorses bilateral lower extremity pain.     Imaging is not reviewed as OSH images were not pushed over to PACS    Past Medical History  She has no past medical history on file.    Surgical History  She has no past surgical history on file.     Social History  She has no history on file for tobacco use, alcohol use, and drug use.    Family History  No family history on file.     Allergies  Albuterol, Dofetilide, Statins-hmg-coa reductase inhibitors, and Sulfa (sulfonamide antibiotics)    Review of Systems   Review of systems was reviewed and otherwise negative other than what was listed in the HPI.    Physical Exam  GENERAL APPEARANCE:  No distress, alert, interactive and cooperative.   RESP: on 5L NC  CARDIOVASCULAR: Radial pulses +2 and equal.   NEURO:  NAD, A&Ox3  PERRL, EOMI, Face symmetric, Facial SILT, Palate/Tongue midline and symmetric, shoulder shrugs symmetric, hearing intact to finger rubs bilaterally  RUE 5   LUE D4 (chronic) o/w 5  RLE 4+ (pain limited)  LLE 2 (pain limited)  SILT  PSYCH: appropriate  SKIN: L periorbital ecchymosis     Last Recorded Vitals  Blood pressure (!) 104/48, pulse 71, temperature 36.9 °C (98.4 °F), temperature source Temporal, resp. rate 19, height 1.651 m (5' 5\"), weight 63 kg (139 lb), SpO2 (!) 87%.    Relevant Results  Results for orders placed or performed " during the hospital encounter of 07/04/24 (from the past 24 hour(s))   Blood Gas Venous Full Panel Unsolicited   Result Value Ref Range    POCT pH, Venous 7.34 7.33 - 7.43 pH    POCT pCO2, Venous 40 (L) 41 - 51 mm Hg    POCT pO2, Venous 25 (L) 35 - 45 mm Hg    POCT SO2, Venous 33 (L) 45 - 75 %    POCT Oxy Hemoglobin, Venous 32.9 (L) 45.0 - 75.0 %    POCT Hematocrit Calculated, Venous 33.0 (L) 36.0 - 46.0 %    POCT Sodium, Venous 135 (L) 136 - 145 mmol/L    POCT Potassium, Venous 5.3 3.5 - 5.3 mmol/L    POCT Chloride, Venous 103 98 - 107 mmol/L    POCT Ionized Calicum, Venous 1.17 1.10 - 1.33 mmol/L    POCT Glucose, Venous 103 (H) 74 - 99 mg/dL    POCT Lactate, Venous 2.2 (H) 0.4 - 2.0 mmol/L    POCT Base Excess, Venous -3.9 (L) -2.0 - 3.0 mmol/L    POCT HCO3 Calculated, Venous 21.6 (L) 22.0 - 26.0 mmol/L    POCT Hemoglobin, Venous 11.1 (L) 12.0 - 16.0 g/dL    POCT Anion Gap, Venous 16.0 10.0 - 25.0 mmol/L    Patient Temperature 37.0 degrees Celsius   CBC and Auto Differential   Result Value Ref Range    WBC 22.0 (H) 4.4 - 11.3 x10*3/uL    nRBC 0.3 (H) 0.0 - 0.0 /100 WBCs    RBC 4.49 4.00 - 5.20 x10*6/uL    Hemoglobin 10.3 (L) 12.0 - 16.0 g/dL    Hematocrit 32.8 (L) 36.0 - 46.0 %    MCV 73 (L) 80 - 100 fL    MCH 22.9 (L) 26.0 - 34.0 pg    MCHC 31.4 (L) 32.0 - 36.0 g/dL    RDW 21.2 (H) 11.5 - 14.5 %    Platelets 229 150 - 450 x10*3/uL    Neutrophils %      Immature Granulocytes %, Automated      Lymphocytes %      Monocytes %      Eosinophils %      Basophils %      Neutrophils Absolute      Lymphocytes Absolute      Monocytes Absolute      Eosinophils Absolute      Basophils Absolute     Protime-INR   Result Value Ref Range    Protime 19.8 (H) 9.8 - 12.8 seconds    INR 1.7 (H) 0.9 - 1.1   Comprehensive metabolic panel   Result Value Ref Range    Glucose 91 74 - 99 mg/dL    Sodium 136 136 - 145 mmol/L    Potassium 4.9 3.5 - 5.3 mmol/L    Chloride 104 98 - 107 mmol/L    Bicarbonate 20 (L) 21 - 32 mmol/L    Anion Gap 17  10 - 20 mmol/L    Urea Nitrogen 30 (H) 6 - 23 mg/dL    Creatinine 1.59 (H) 0.50 - 1.05 mg/dL    eGFR 34 (L) >60 mL/min/1.73m*2    Calcium 8.6 8.6 - 10.6 mg/dL    Albumin 3.3 (L) 3.4 - 5.0 g/dL    Alkaline Phosphatase 61 33 - 136 U/L    Total Protein 5.8 (L) 6.4 - 8.2 g/dL    AST 13 9 - 39 U/L    Bilirubin, Total 1.3 (H) 0.0 - 1.2 mg/dL    ALT 12 7 - 45 U/L   Creatine Kinase   Result Value Ref Range    Creatine Kinase 20 0 - 215 U/L   Bilirubin, Direct   Result Value Ref Range    Bilirubin, Direct 0.4 (H) 0.0 - 0.3 mg/dL   POCT GLUCOSE   Result Value Ref Range    POCT Glucose 97 74 - 99 mg/dL          Assessment/Plan     Bhargavi Isidro is a 74 y.o. female with h/o HTN, HLD, CAD , STEMI s/p CABG, diastolic heart failure, congenital heart block s/p pacemeaker, PAH, Afib ((on eliquis, s/p reversal at OSH), CKD stage IV, chronc hypoxic resp failure, COPD (on home 5L NC), LEONEL, OA, chronic spine degen, GERD, p/w fall, CTH SDH     Patient is with stable neuroexam.  However, will need CT head to evaluate the subdural hematoma.    Recs  Please obtain STAT CTH   CBC/RFP/coag/T&S/UA/EKG/CXR  Continue to hold AC/AP  Further recs pending above imaging    Patient is discussed with chief resident, who agrees with above assessment and plan. Note is not final until signed by attending physician.     Note authored by resident on neurosurgery team, with all questions or to contact team please page at [phone]    Opal Ríos MD  Neurosurgery, PGY-2

## 2024-07-04 NOTE — ED TRIAGE NOTES
Patient is being transferred from [facility] for services not available locally given that she was a 74-year-old on warfarin who presented with a fall but has a history of pulmonary hypertension who has a constant infusion of Remodulin.  Patient was found have a right subdural hematoma but has a GCS of 15, pelvic fractures but ring is intact.  The pelvic fracture is associated acute bleed and the patient's blood pressure is downtrending so she got 1 unit of blood.  Patient also incidentally was found of diverticulitis and got antibiotics.  The patient's remodeling pump is 48 hours of her module and remaining.  Patient is excepted as a full trauma.

## 2024-07-04 NOTE — ED PROVIDER NOTES
CC: Trauma and Fall     HPI:   Patient is a 74-year-old female coming in as a full trauma activation transfer from [facility] with past medical history of CAD, pulmonary hypertension on Remodulin and 6 L O2 requirement at baseline (sats between 79% 96% per patient), A-fib on Tikosyn and Eliquis, COPD, anxiety, complete heart block status post pacemaker presenting due to concern for pelvic bleeding, right rib fractures and subdural hemorrhage.  Patient fell from standing after she lost balance but denies any preceding lightheadedness and denies any active chest pain, shortness of breath.  She is on her baseline 6 L of oxygen at this time.  Patient did strike her head and has superficial skin tears over her left upper and lower extremity along with hemostatic left facial laceration and small abrasion over her right frontal forehead.  Her last dose of Eliquis was yesterday at 5 PM and she was reversed with Feiba.  Patient is a GCS of 15 on initial evaluation and is complaining of left-sided pain.  FAST exam was inconclusive for the pelvis but negative for the right upper quadrant, left upper quadrant and cardiac views.      Limitations to History: none  Additional History Obtained from: EMS    PMHx/PSHx:  Per HPI.   - has no past medical history on file.  - has no past surgical history on file.    Social History:  - Tobacco:  has no history on file for tobacco use.   - Alcohol:  has no history on file for alcohol use.   - Drugs:  has no history on file for drug use.     Medications: Reviewed in EMR.     Allergies:  Albuterol, Dofetilide, Statins-hmg-coa reductase inhibitors, and Sulfa (sulfonamide antibiotics)    ???????????????????????????????????????????????????????????????  Triage Vitals:  T 36.2 °C (97.2 °F)  HR 71  BP 98/54  RR    O2 (!) 79 % Supplemental oxygen    Physical Exam  Vitals and nursing note reviewed.   Constitutional:       General: She is not in acute distress.     Appearance: She is well-developed.    HENT:      Head: Normocephalic and atraumatic.      Right Ear: Tympanic membrane and external ear normal.      Left Ear: Tympanic membrane and external ear normal.      Mouth/Throat:      Mouth: Mucous membranes are dry.      Pharynx: Oropharynx is clear.   Eyes:      Conjunctiva/sclera: Conjunctivae normal.   Cardiovascular:      Rate and Rhythm: Normal rate and regular rhythm.      Pulses: Normal pulses.      Heart sounds: Normal heart sounds. No murmur heard.  Pulmonary:      Effort: Pulmonary effort is normal. No respiratory distress.      Breath sounds: Normal breath sounds.   Chest:      Chest wall: No tenderness.   Abdominal:      Palpations: Abdomen is soft.      Tenderness: There is abdominal tenderness (over lower abdomen). There is no guarding or rebound.      Comments: Remodulin pouch in place   Musculoskeletal:         General: Tenderness (over L shoulder without obvious deformity) present. No swelling.      Cervical back: No tenderness.   Skin:     General: Skin is warm and dry.      Capillary Refill: Capillary refill takes less than 2 seconds.      Findings: Bruising (Entensive bruising in different stages with new skin tears that are hemostatic on LUE and over L lateral thigh) present.   Neurological:      Mental Status: She is alert and oriented to person, place, and time.      Cranial Nerves: No cranial nerve deficit.      Sensory: No sensory deficit.      Motor: No weakness.       ????????????????????????????????????????????????    ED Course  Diagnoses as of 07/04/24 1027   SDH (subdural hematoma) (Multi)   Fall, initial encounter   Hemorrhage of pelvic artery   Pulmonary HTN (Multi)       Medical Decision Making:  Patient is a 74-year-old female with past medical history of CAD, pulmonary hypertension on Remodulin, COPD on 5 to 6 L at baseline, A-fib on Tikosyn and Eliquis, anxiety, complete heart block status post pacemaker presenting due to a fall from standing with a known subdural as well  as pelvic bleeding and remote history of right rib fractures.  Patient is hemodynamically stable and was receiving a unit of blood that was finishing up while on transport.  Patient's initial pulse ox was 79% was transition from 6 L to nonrebreather and then back to 6 L.  Pulmonary consult was placed and they recommended aiming for O2 sat greater than 90% while patient is in an acute phase.  Appreciate pulmonary assistance with placing orders for Remodulin pump.  Patient's pump has another 24 hours before it runs out.  She is currently transition back to her 6 L.  Unable to elucidate whether the patient received tetanus at [facility] prior to transfer however will need to be reassessed while on trauma service.  Patient on CTA performed to evaluate for acute bleed for possible IR intervention.  Patient was taken to the TQ shortly after in hemodynamically stable condition.  GCS continued to be 15.  Patient care was overseen by attending physician agrees with the plan and disposition.    External records reviewed: recent inpatient, clinic, and prior ED notes  Diagnostic imaging independently reviewed/interpreted by me (as reflected in MDM) includes: FAST, CTA   Social Determinants Affecting Care:  chronic complex medical conditions  Discussion of management with other providers: Attending, Trauma, MICU attending, Pulm fellow  Prescription Drug Consideration: per inpatient team  Escalation of Care: ICU    Impression:   SDH  Pelvic bleeding  Skin tears  Pulm HTN    Disposition: Admitted      Procedures ? SmartLinks last updated 7/4/2024 10:24 AM     Diana Andersen  PGY-2 Emergency Medicine  [facility]     Diana Andersen MD  Resident  07/04/24 0783

## 2024-07-04 NOTE — NURSING NOTE
Per MD Toshia Addison, pt has pulmonary hypertension and keep sats 79-90%. Toshia Lainez MD messaged RN stating that pt should be sats >90%, she had miss heard pulmonary earlier. MD Goddard stated that sats >88% is good for now. RN aware.

## 2024-07-04 NOTE — CONSULTS
Department of Medicine  Division of Pulmonary, Critical Care, and Sleep Medicine  Consult reason: Pulmonary Hypertension    History Of Present Illness  74-year-old female with past medical history of Group I/II/III Pulmonary hypertension (on Remodulin pump and Sildenafil; follows with Dr. Blackwell [facility]), Diastolic congestive heart failure, atrial fibrillation (on eliquis), congenital heart block s/p pacemaker, CAD s/p CABG, severe sleep disordered breathing (AHI 45) requiring BIPAP , mild obstructive lung disease/COPD and chronic hypoxic respiratory failure (uses 6L at baseline 24/7), who is admitted for fall, due to concern for pelvic bleeding, right rib fractures and subdural hemorrhage. Pulmonary is consulted for Pulmonary HTN.    Currently denies worsening SOB, dizziness/fainting, chest pain/pressure, palpitations, edema in extremities. Hasn't been using her BiPAP since past 1 year and uses 6L NC 24/7.    Per [facility] note (6/3/24)  - Diagnosed 2017 with a RHC, demonstrating precapillary PH with PVR of 7 COHEN.  However, had severe hypoventilation syndrome (AHI 45 on sleep study) at the time.     - Placed on several vasodilators including opsumit and tyvaso, which did not make her feel much better.  Stopped opsumit and converted to leatiris in May 2019.   - Fall of 2019, attempted to uptitrate vasodilators by adding sildenafil, which resulted in her having several hospitalizations for shortness of breath.  - Another RHC which demonstrated predominantly pulmonary venous HTN and only a small component if precapillary PH so the decision was made to focus on treating her underlying heart disease (HfPEF and afib) and sleep disorder (finally started on BIPAP in October 2019 and keep her on a very low dose of sildenafil of 10mg TID if it was making her feel better.   - Referred for lung transplant fall 2020 but after discussion with lung transplant MD Burk opted not to pursue the rest of the lung transplant work up  as she felt it was unlikely based on their discussion that she would be a candidate because of her heart disease.   - Had AV node abalation and placement of pacemaker in winter 2021. Several weeks later, had syncope.   - 2021 Admitted and had RHC, which now demonstrated predominantly precapillary PH and was started on IV remodulin and sidlenafil uptitrated to 20mg TID.   - Hospitalized for 22 days in summer 2021 for prostacyclin side effects including diarrhea leading to DEBORA and severe hypoxic respiratory failure. Was able to avoid dialysis, kidneys recovered and she discharged home on 20mg TID sildenafil and 7ng/kg/min remodulin where she remained for the remainder of  and into .   - Had repeat RHC in 2022, showed disease progression of both pre and post capillary PH     Right Heart Catheterization 10/2022 significant for RAP 20 PAP 92/45 mPAP 61 PCWP 26 CO/CI (TD) 3.1/1.74 PVR 11.9 PaSat 57 Additional Maneuvers included None   Right Heart Catheterization on 2021 on 7ng/kg/min remodulin 20mg TID sildenafil: RAP 22 RVSP 72 PAP 72/40 mPAP 51 PCWP 24 CO/CI 3.9/2.1 (TD) PaSat 45%  PVR 6.9  Right Heart Catheterization 2021 (on 10ng/kg/min remodulin, 10mg TID Sildenafil): RAP 14 RVSP 67 PAP 67/36 mPAP 46 PCWP 21 CO/CI (TD) 3.9/2.13 PVR 6.41 COHEN SvO2 56%   Right Heart Catheterization on 2021 (on no medications, sildenafil held 4 days prior): RAP 11 RVSP 66 PAP 66/37 mPAP 47 PCWP 17 CO/CI (TD) 2.7/1.46 PVR 11.11 COHEN SvO2 52%   Right Heart Catheterization  on 10/17/19 (on 10mg sildenafil Q8) significant for RAP 15mmhg, RVSP 73mmhg, PAP 73/29, mPAP 39, PCWP 24mmhg, DPmmHg, TPG 15mmhg, CO/CI: 4.0/2.22, PVR 3.75 COHEN, PaSat 63%. Weight on day of RHC 73kg (160 pounds)    Right heart catheterization (outside) significant for RAP 3, PAP 74/27 mPAP 46, PWCP 8, CO/CI 5.33/2.93 PA sat: 81%, PVR: 7.12 COHEN    Last Echo:  [facility] - 2024 2:42 PM EDT   CONCLUSIONS:  - Exam  indication: Pulmonary Artery HTN  - The left ventricle is small. There is concentric left ventricular hypertrophy.  Left ventricular systolic function is normal. EF = 55 ± 5% (2D biplane) Grade II left ventricular diastolic dysfunction.  - The right ventricle is dilated. Right ventricular systolic function is low  normal.  - The left atrial cavity is mildly dilated.  - The right atrial cavity is dilated.  - There is severe (4+) tricuspid valve regurgitation.  - Estimated right ventricular systolic pressure is 76 mmHg consistent with moderately severe pulmonary hypertension (but may be underestimated due to severe  TR). Estimated right atrial pressure is 15 mmHg based on IVC assessment.  - Exam was compared with the prior  echocardiographic exam performed on  8/9/2023. Similar findings.    Pulmonary HTN meds:  Remodulin 1 mg/mL soln 39 ng/kg/min, dosing weight 67 kg, 90,000 ng/ml, rate 42 ml/24 hours. [facility] [employer].  Sildenafil (REVATIO) 20 mg tablet Take 1 tablet by mouth three times a day.     Date Score Therapy    7/2019 12 Letairis 10mg daily    10/2019 9 Sildenafil 10mg TID    1/10/2020 10 Sildeanfil 10mg TID    07/01/20 9 Sildenafil 10mg TID    01/13/21 9 Sildenafil 10mg TID    05/05/21 14 Sildenafil 20mg TID, Remodulin 10ng/kg/min (started 4/14/2021)   01/03/22    10 Sildenafil 20mg TID remodulin 7ng/kg/min   December 14, 2022    11 Sildenafil 20mg TID remodulin 11ng/kg/min   6/2024 11 Sildenafil 20mg TID and remodulin 39ng/kg/min   Interpretation:   Low 0-6: <5% risk of one year mortality  Intermediate 7-8: 5-10% risk of one year mortality   High -9 or above: >10% risk of one year mortality     Allergies  Albuterol, Dofetilide, Statins-hmg-coa reductase inhibitors, and Sulfa (sulfonamide antibiotics)    Physical exam  Constitutional: Normal appearance.  HEENT: Normocephalic and atraumatic.  Cardiovascular: Normal rate and regular rhythm.  Pulmonary: Normal respiratory effort, bilateral clear  "breath sounds, no wheezing or rhonchi.  Musculoskeletal: No edema, no cyanosis.  Neurological: Awake, alert and oriented x3.  Psychiatric: Normal behavior, mood and affect.     Vital Signs  Visit Vitals  /54   Pulse 71   Temp 36.2 °C (97.2 °F)   SpO2 (!) 79%      No results found for: \"WBC\", \"HGB\", \"HCT\", \"MCV\", \"PLT\"   No results found for: \"GLUCOSE\", \"CALCIUM\", \"NA\", \"K\", \"CO2\", \"CL\", \"BUN\", \"CREATININE\"   No results found for: \"ALT\", \"AST\", \"GGT\", \"ALKPHOS\", \"BILITOT\"     Oxygen Therapy  SpO2: (!) 79 %  Medical Gas Therapy: Supplemental oxygen  O2 Delivery Method: Nasal cannula       Medications   Scheduled medications  [Transfer Hold] polyethylene glycol, 17 g, oral, Daily      Continuous medications     PRN medications  PRN medications: [Transfer Hold] bisacodyl, [Transfer Hold] ondansetron **OR** [Transfer Hold] ondansetron, oxygen     Chest Radiograph   CT chest w IV contrast 07/04/2024    Narrative  * * *Final Report* * *    DATE OF EXAM: Jul 4 2024  5:58AM    [facility]   [id number]  -  CT CHEST W IVCON  / ACCESSION #  [id number]    PROCEDURE REASON: Chest trauma, blunt    * * * * Physician Interpretation * * * *    EXAMINATION:  CT CHEST W IVCON, CT ABD/PEL W IVCON, CT LUMBAR SPINE W  RECON DATA -NB, CT T-SPINE W RECON DATA -NB    CLINICAL HISTORY: Chest trauma, blunt (accession [id number]), Abdominal  trauma, blunt (accession [id number]), Spine fracture, lumbar, traumatic  (accession [id number]), Spine fracture, thoracic, traumatic (accession  [id number]  Exam Date:  7/4/2024 5:58 AM  Comparison: 04/09/2021 chest CT    Contrast:   ml of Omnipaque 350  CT Radiation dose: Integrated Dose-length product (DLP) for this visit =  3074 mGy*cm  CT Dose Reduction Employed: Automated exposure control (AEC)      RESULT:  Right IJ central venous line.  Left chest pacemaker.  Mild cardiomegaly.  Coronary artery calcifications.  Aortic valvular calcifications.  Surgical changes of CABG.  Intact aortic arch.  No mediastinal " hematoma.  Patchy groundglass densities right upper lobe may be infectious or  inflammatory.  No pneumothorax pleural effusion.  Multiple chronic right  rib fractures.  Median sternotomy wires.  No acute left rib fractures.  Multiple right lumbar transverse process fractures.  Acute left inferior  and superior pubic rami fractures.  Left hemisacral zone 1 fracture.  Aortoiliac calcifications.  Colonic diverticulosis.  Space of Retzius  hematoma with left aspect contrast blush coronal series 4 image 32.  Left  urinary bladder wall thickening 11 mm or hematoma.  There is sigmoid  colon fat inflammation.  Left pelvic rim-enhancing fluid collection 4.9 x  2.1 cm.    Chronic superior endplate of L2 vertebral body fracture.    Impression  IMPRESSION:  1.  Chronic right rib fractures.  No acute rib fracture.  2.  Acute left obturator fractures with associated zone 1 left hemisacral  fracture.  3.  Space of Retzius hematoma with active hemorrhage along the left  aspect of the hematoma.  4.  Left urinary bladder wall thickening, malignancy versus hematoma.  Correlate with direct visualization.  5.  Sigmoid colon diverticulitis with associated adjacent fluid  collection representing abscess.    URGENT RESULTS  Acuity: Urgent  Communication:  Communicated with CASTRO DE LA TORRE on  7/4/2024 6:58 AM  via verbal communication.            : PSCSOPHIA  Transcribe Date/Time: Jul 4 2024  6:38A    Dictated by : STEFANI ZAIDI MD    This examination was interpreted and the report reviewed and  electronically signed by:  STEFANI ZAIDI MD on Jul 4 2024  6:59AM  EST      XR chest 1 view 07/04/2024    Narrative  * * *Final Report* * *    DATE OF EXAM: Jul 4 2024  5:51AM    FV[facility]   [id number]  -  XR CHEST 1V FRONTAL PORT  / ACCESSION #  [id number]    PROCEDURE REASON: Chest pain, nonspecific    * * * * Physician Interpretation * * * *    EXAMINATION:  CHEST RADIOGRAPH (PORTABLE SINGLE VIEW AP)    Exam Date/Time:  7/4/2024  "5:51 AM  CLINICAL HISTORY: Chest pain, nonspecific, Polytrauma, blunt, Chest trauma  MQ:  [id number]  Comparison:  02/19/2024.    RESULT:    Lines, tubes, and devices:  Right IJ approach central venous catheter  extends to the region of the SVC.  Left-sided pacemaker.    Lungs and pleura:  Mild diffuse prominence of the pulmonary vasculature  throughout both lungs.  No dense consolidation, pleural effusion, or  pneumothorax.    Cardiomediastinal silhouette:  Stable cardiomegaly.    Other:  No other concerning change from prior.    Impression  IMPRESSION:    See result.                    : PSCSOPHIA  Transcribe Date/Time: Jul 4 2024  6:32A    Dictated by : JOHN BILLINGSLEY MD    This examination was interpreted and the report reviewed and  electronically signed by:  JOHN BILLINGSLEY MD on Jul 4 2024  6:42AM  EST         Pulmonary Function Tests   Pulmonary Functions Testing Results:  No results found for: \"FEV1\", \"FVC\", \"[id number]\", \"TLC\", \"DLCO\"       Autoimmune Testing     No results found for: \"ANATITER\", \"ANAPATTRN\", \"ANACO\", \"RO\", \"LA\", \"ARNP\", \"EMPSMRNP\", \"JIB\", \"ASCL\", \"EMPINTERP\", \"NONUHFIRE\", \"CITAB\", \"ANCA\", \"ANCPA\", \"ANCTI\"     Assessment and Plan / Recommendations   Assessment/Plan   74-year-old female with past medical history of Group I/II/III Pulmonary hypertension (on Remodulin pump and Sildenafil; follows with Dr. Rosalva BARRIOS), Diastolic congestive heart failure, atrial fibrillation (on eliquis), congenital heart block s/p pacemaker, CAD s/p CABG, severe sleep disordered breathing (AHI 45) requiring BIPAP , mild obstructive lung disease/COPD and chronic hypoxic respiratory failure on 5L at baseline, who is admitted for fall, due to concern for pelvic bleeding, right rib fractures and subdural hemorrhage. Pulmonary is consulted for Pulmonary HTN.    # Group I/II/III Pulmonary Hypertension  - Follows with Dr. Rosalva BARRIOS  - Ordered Sildenafil 20mg TID  - Ordered Remodulin through PIV at this time. " However, patient is not agreeable to receiving medication via peripheral IV. She was told to use 'only' Saunders line central catheter which was placed at outside facility few weeks ago per patient. Our big concern is her mentation in setting of SDH and poor sleep apnea. Patient is on q1 neuro checks. We have explained the patient that administering the medication by herself may be unsafe given the acute and underlying medical conditions. But the patient has been adamant about using her own cassettes and premixing it on her own. Currently, patient has the capacity to make her own decisions and will be asking her friend to bring cassettes and medication from home. She is due for cassette change 7/5 evening. We recommend to continue neuro checks, and reach out to us if there is a change in her mental status, so we can continue her Pulm HTN management by giving her Remodulin through IV.    Assessment and plan was discussed with Dr. Kaur.

## 2024-07-04 NOTE — CONSULTS
Orthopaedic Surgery Consult H&P      Requesting Provider / Service:  TICU    CC: Pelvic pain/fxs    HPI: 74F (CAD, pulm HTN, Afib on Tikosyn and Eliquis, COPD, complete heart block s/p pacemaker) [facility] transfer after mGLF with a L incomplete LC1 pelvic ring injury. Also w/ SDH, R rib fxs, pelvic hematoma stable on CTA. Closed, NVI. XR/CT w/ above.     PMH:  History reviewed. No pertinent past medical history.    History reviewed. No pertinent surgical history.    Social History     Socioeconomic History    Marital status:      Spouse name: Not on file    Number of children: Not on file    Years of education: Not on file    Highest education level: Not on file   Occupational History    Not on file   Tobacco Use    Smoking status: Not on file    Smokeless tobacco: Not on file   Substance and Sexual Activity    Alcohol use: Not on file    Drug use: Not on file    Sexual activity: Not on file   Other Topics Concern    Not on file   Social History Narrative    Not on file     Social Determinants of Health     Financial Resource Strain: Medium Risk (12/12/2023)    Received from [facility]    Overall Financial Resource Strain (CARDIA)     Difficulty of Paying Living Expenses: Somewhat hard   Food Insecurity: No Food Insecurity (12/12/2023)    Received from [facility]    Hunger Vital Sign     Worried About Running Out of Food in the Last Year: Never true     Ran Out of Food in the Last Year: Never true   Transportation Needs: No Transportation Needs (12/12/2023)    Received from [facility]    PRAPARE - Transportation     Lack of Transportation (Medical): No     Lack of Transportation (Non-Medical): No   Physical Activity: Inactive (12/12/2023)    Received from [facility]    Exercise Vital Sign     Days of Exercise per Week: 0 days     Minutes of Exercise per Session: 0 min   Stress: Stress Concern Present (12/12/2023)    Received from [facility]  Occupational Health - Occupational Stress Questionnaire     Feeling of Stress : Rather much   Social Connections: Moderately Isolated (12/12/2023)    Received from [facility]    Social Connection and Isolation Panel [NHANES]     Frequency of Communication with Friends and Family: Never     Frequency of Social Gatherings with Friends and Family: Once a week     Attends Hoahaoism Services: More than 4 times per year     Active Member of Clubs or Organizations: Not on file     Attends Club or Organization Meetings: More than 4 times per year     Marital Status:    Intimate Partner Violence: Not on file   Housing Stability: Low Risk  (12/12/2023)    Received from [facility]    Housing Stability Vital Sign     Unable to Pay for Housing in the Last Year: No     Number of Places Lived in the Last Year: 1     Unstable Housing in the Last Year: No       Allergies   Allergen Reactions    Albuterol Unknown     a-fib    Pt. Given albuterol had bigeminy PVC's and V-Tach.    Dofetilide Unknown     Torsades    Statins-Hmg-Coa Reductase Inhibitors Unknown     leg cramps with atorvastatin, Crestor, Zocor    Sulfa (Sulfonamide Antibiotics) Unknown     Pt stated she does not think she has an allergy to sulfa drugs         Current Facility-Administered Medications:     acetaminophen (Tylenol) tablet 650 mg, 650 mg, oral, q6h, Hortencia Rucker MD, 650 mg at 07/04/24 1158    HYDROmorphone (Dilaudid) injection 0.2 mg, 0.2 mg, intravenous, q3h PRN, Hortencia Rucker MD    ondansetron (Zofran) tablet 4 mg, 4 mg, oral, q8h PRN **OR** ondansetron (Zofran) injection 4 mg, 4 mg, intravenous, q8h PRN, Alonso Goddard MD    oxyCODONE (Roxicodone) immediate release tablet 10 mg, 10 mg, oral, q4h PRN, Hortencia Rucker MD, 10 mg at 07/04/24 1352    oxyCODONE (Roxicodone) immediate release tablet 5 mg, 5 mg, oral, q4h PRN, Hortencia Rucker MD    oxygen (O2) therapy, , inhalation, Continuous PRN - O2/gases, Alonso Goddard  "MD, 10 L/min at 07/04/24 1435    pantoprazole (ProtoNix) EC tablet 40 mg, 40 mg, oral, Daily before breakfast, Hortencia Rucker MD, 40 mg at 07/04/24 1158    sildenafil (Revatio) tablet 20 mg, 20 mg, oral, TID, Jenny Lyle MD    treprostinil (Remodulin) 75 mcg/mL in diluent for treprostinil 100 mL Bag, 39 ng/kg/min (Order-Specific), intravenous, Continuous, Jenny Lyle MD    Family History: non-contributory      Review of Systems:   ROS  No pertinent symptoms related to systems other than those stated above      O:  @24HRVITALS@    Intake/Output Summary (Last 24 hours) at 7/4/2024 1507  Last data filed at 7/4/2024 1300  Gross per 24 hour   Intake --   Output 295 ml   Net -295 ml       Physical Exam:   /53   Pulse 71   Temp 36.9 °C (98.4 °F) (Temporal)   Resp 19   Ht 1.651 m (5' 5\")   Wt 63 kg (139 lb)   SpO2 94%   BMI 23.13 kg/m²     Constitutional: NAD  HEENT: hearing and vision grossly intact, MMM  Resp: breathing comfortably on RA  CV: extremities warm, well perfused  Neuro: alert, sensory and motor grossly intact  Psych: Appropriate mood and affect    MSK:  Bilateral Lower Extremity/Pelvis:   -Tender at site of injury   -Fires DF/PF/EHL/FHL bilaterally   -SILT in saph/sural/SPN/DPN distributions  -Foot warm, well perfused  -Palpable DP pulse, brisk cap refill      Relevant Results  Results for orders placed or performed during the hospital encounter of 07/04/24 (from the past 24 hour(s))   Blood Gas Venous Full Panel Unsolicited   Result Value Ref Range    POCT pH, Venous 7.34 7.33 - 7.43 pH    POCT pCO2, Venous 40 (L) 41 - 51 mm Hg    POCT pO2, Venous 25 (L) 35 - 45 mm Hg    POCT SO2, Venous 33 (L) 45 - 75 %    POCT Oxy Hemoglobin, Venous 32.9 (L) 45.0 - 75.0 %    POCT Hematocrit Calculated, Venous 33.0 (L) 36.0 - 46.0 %    POCT Sodium, Venous 135 (L) 136 - 145 mmol/L    POCT Potassium, Venous 5.3 3.5 - 5.3 mmol/L    POCT Chloride, Venous 103 98 - 107 mmol/L    POCT Ionized Calicum, " Venous 1.17 1.10 - 1.33 mmol/L    POCT Glucose, Venous 103 (H) 74 - 99 mg/dL    POCT Lactate, Venous 2.2 (H) 0.4 - 2.0 mmol/L    POCT Base Excess, Venous -3.9 (L) -2.0 - 3.0 mmol/L    POCT HCO3 Calculated, Venous 21.6 (L) 22.0 - 26.0 mmol/L    POCT Hemoglobin, Venous 11.1 (L) 12.0 - 16.0 g/dL    POCT Anion Gap, Venous 16.0 10.0 - 25.0 mmol/L    Patient Temperature 37.0 degrees Celsius   CBC and Auto Differential   Result Value Ref Range    WBC 22.0 (H) 4.4 - 11.3 x10*3/uL    nRBC 0.3 (H) 0.0 - 0.0 /100 WBCs    RBC 4.49 4.00 - 5.20 x10*6/uL    Hemoglobin 10.3 (L) 12.0 - 16.0 g/dL    Hematocrit 32.8 (L) 36.0 - 46.0 %    MCV 73 (L) 80 - 100 fL    MCH 22.9 (L) 26.0 - 34.0 pg    MCHC 31.4 (L) 32.0 - 36.0 g/dL    RDW 21.2 (H) 11.5 - 14.5 %    Platelets 229 150 - 450 x10*3/uL    Neutrophils % 86.0 40.0 - 80.0 %    Immature Granulocytes %, Automated 1.3 (H) 0.0 - 0.9 %    Lymphocytes % 6.1 13.0 - 44.0 %    Monocytes % 5.9 2.0 - 10.0 %    Eosinophils % 0.5 0.0 - 6.0 %    Basophils % 0.2 0.0 - 2.0 %    Neutrophils Absolute 18.89 (H) 1.60 - 5.50 x10*3/uL    Immature Granulocytes Absolute, Automated 0.28 0.00 - 0.50 x10*3/uL    Lymphocytes Absolute 1.34 0.80 - 3.00 x10*3/uL    Monocytes Absolute 1.30 (H) 0.05 - 0.80 x10*3/uL    Eosinophils Absolute 0.11 0.00 - 0.40 x10*3/uL    Basophils Absolute 0.05 0.00 - 0.10 x10*3/uL   Protime-INR   Result Value Ref Range    Protime 19.8 (H) 9.8 - 12.8 seconds    INR 1.7 (H) 0.9 - 1.1   Comprehensive metabolic panel   Result Value Ref Range    Glucose 91 74 - 99 mg/dL    Sodium 136 136 - 145 mmol/L    Potassium 4.9 3.5 - 5.3 mmol/L    Chloride 104 98 - 107 mmol/L    Bicarbonate 20 (L) 21 - 32 mmol/L    Anion Gap 17 10 - 20 mmol/L    Urea Nitrogen 30 (H) 6 - 23 mg/dL    Creatinine 1.59 (H) 0.50 - 1.05 mg/dL    eGFR 34 (L) >60 mL/min/1.73m*2    Calcium 8.6 8.6 - 10.6 mg/dL    Albumin 3.3 (L) 3.4 - 5.0 g/dL    Alkaline Phosphatase 61 33 - 136 U/L    Total Protein 5.8 (L) 6.4 - 8.2 g/dL    AST  13 9 - 39 U/L    Bilirubin, Total 1.3 (H) 0.0 - 1.2 mg/dL    ALT 12 7 - 45 U/L   Creatine Kinase   Result Value Ref Range    Creatine Kinase 20 0 - 215 U/L   Bilirubin, Direct   Result Value Ref Range    Bilirubin, Direct 0.4 (H) 0.0 - 0.3 mg/dL   Morphology   Result Value Ref Range    RBC Morphology See Below     Ovalocytes Few    Lavender Top   Result Value Ref Range    Extra Tube Hold for add-ons.    POCT GLUCOSE   Result Value Ref Range    POCT Glucose 97 74 - 99 mg/dL       CT angio abdomen pelvis w and or wo IV IV contrast    Result Date: 7/4/2024  Interpreted By:  Michael Wolfe  and Latrice Sauer STUDY: CT ANGIO ABDOMEN PELVIS W AND/OR WO IV IV CONTRAST;  7/4/2024 10:34 am   INDICATION: Signs/Symptoms:known trauma.   COMPARISON: CT abdomen and pelvis 03/09/2016   ACCESSION NUMBER(S): [id number]   ORDERING CLINICIAN: CHANNING CAGLE   TECHNIQUE: Contiguous non-contrast axial images of the abdomen and pelvis were initially obtained.   Thin-section axial images of the abdomen and pelvis were obtained in the arterial and portal venous phase after intravenous administration of 90 mL Omnipaque 350 contrast. Sagittal and coronal reformatted images were provided. MIP images and 3D reconstructions were created on an independent workstation and reviewed.   FINDINGS: VASCULATURE:   ABDOMINAL AORTA: No abdominal aortic aneurysm or dissection. Moderate abdominal aortic atherosclerosis.   ABDOMINAL and PELVIC ARTERIES: No hemodynamically significant stenosis or occlusion. There is short segment severe stenosis of the celiac trunk with preserved patency (series 504, image 108). There is moderate calcified atherosclerosis near the origin of the celiac trunk. Branches of the celiac trunk are patent without significant stenosis. The superior mesenteric artery is patent with mild atherosclerosis but no significant stenosis. The inferior mesenteric artery is patent with calcified atherosclerosis near its origin no  significant stenosis.   There is severe stenosis of the left renal artery with preserved patency (series 503, image 61). The right renal artery is patent without significant stenosis or atherosclerosis.   There is moderate atherosclerosis of the right common iliac artery. There is moderate-to-severe calcified atherosclerosis of the right internal iliac artery with moderate stenosis. The right external iliac artery is patent mild-to-moderate atherosclerosis of the origin with no significant stenosis. The right common femoral and superficial femoral arteries are patent with mild calcified atherosclerosis without significant stenosis. The right profunda artery is patent.   There is mild calcified atherosclerosis of the left common iliac artery with mild stenosis. There is moderate-to-severe calcified atherosclerosis of the left internal iliac artery with moderate stenosis (series 503, image 64). The left external iliac artery is patent with mild-to-moderate atherosclerosis. The left common femoral and superficial femoral arteries are patent with mild calcified atherosclerosis without significant stenosis. The left profunda artery is patent.   CT ABDOMEN/PELVIS:   LOWER CHEST: No acute abnormality. Postsurgical changes from median sternotomy. There is posterior dependent atelectasis. No evidence of pleural effusion, consolidation, or pneumothorax within the visualized field of the lungs. Multiple chronic right rib fractures.   ABDOMINAL WALL: No significant abnormality.   LIVER: No significant parenchymal abnormality.   BILE DUCTS: No significant intrahepatic or extrahepatic dilatation.   GALLBLADDER: No significant abnormality.   SPLEEN: No significant abnormality.   PANCREAS: No significant abnormality. Diffuse fatty atrophy of the pancreas   ADRENALS: No significant abnormality.   KIDNEYS, URETERS, BLADDER: No significant abnormality. There is likely retained contrast within the kidneys from prior exam. No evidence  of nephroureterolithiasis or hydroureteronephrosis. There is mild thickening of the anterior bladder wall that may be compatible with hematoma.   REPRODUCTIVE ORGANS: No pelvic masses.   VESSELS: (See above). No additional significant abnormality.   LYMPH NODES/RETROPERITONEUM: No enlarged lymph nodes.   BOWEL/MESENTERY/PERITONEUM: There is sigmoid diverticulosis with associated fat stranding and fluid collection adjacent measuring 5.1 x 2.4 cm (series 501, image 344). The findings could be compatible with a sigmoid diverticulitis with associated collection. The appendix is not definitively visualized but no evidence of pericecal inflammation to suggest appendicitis..   There is a 5.1 x 1.9 cm hematoma within the anterior space of Retzius without definitive evidence of contrast extravasation (series 501, image 396).   No significant ascites or free air.   OSSEOUS STRUCTURES: Acute left inferior and superior pubic rami fractures (series 503, image 66 series 503, image 45). There is also a left brit sacral zone 1 fracture. (Series 503, image 77). There are right transverse process L1 and L2 fractures. There is also a chronic L2 superior endplate compression deformity.       No acute abdominal aortic pathology or AAA. There is severe stenosis of the celiac trunk with reconstitution distally. Additional vascular findings as described above.   Space of Retzius hematoma measuring 5.1 x 1.9 cm without evidence of active contrast extravasation. There is anterior bladder wall thickening that could be compatible with hematoma.   There is sigmoid diverticulosis with associated fat stranding that is likely compatible with diverticulitis. There is a nearby fluid collection inferiorly measuring 5.1 x 2.4 cm. The sterility of the fluid collection can not be determined on this exam but given the nearby diverticulitis, abscess can not be definitively excluded.   Acute left inferior and superior pubic rami fractures, left brit sacral  zone 1 fracture, L1 and L2 right transverse process fracture.   Additional findings as described above.   I personally reviewed the images/study and I agree with the findings as stated by Camacho Pollard MD. This study was interpreted at [facility], [place], [place].   MACRO: Critical Finding:  See findings. Notification was initiated on 7/4/2024 at 1:52 pm by  Camacho Pollard.  (**-OCF-**) Instructions:    .   Signed by: Michael Wolfe 7/4/2024 2:16 PM Dictation workstation:   [id number]    CT head wo IV contrast    Result Date: 7/4/2024  Interpreted By:  Jairo Toro, STUDY: CT HEAD WO IV CONTRAST;  7/4/2024 1:18 pm   INDICATION: Signs/Symptoms:R SDH ; repeat to determine stability.   COMPARISON: 07/04/2024 at 1:20 p.m.   ACCESSION NUMBER(S): [id number]   ORDERING CLINICIAN: KARISSA ELIAS   TECHNIQUE: Noncontrast axial CT images of head were obtained with coronal and sagittal reconstructed images.   FINDINGS: BRAIN PARENCHYMA: Mild periventricular and subcortical hemispheric white matter hypodensities are most compatible with chronic small vessel ischemic disease. No acute intraparenchymal hemorrhage or parenchymal evidence of acute large territory ischemic infarct. No mass-effect. Gray-white matter distinction is preserved.   VENTRICLES and EXTRA-AXIAL SPACES: There is intravascular contrast from prior CT. There is a hyperdense subdural hematoma overlying the right cerebral convexity that is most thick at the right parietal lobe measuring 1.1 cm on non angled axial images. This is associated with a trace amount of adjacent sulcal subarachnoid hemorrhage immediately underlying the subdural. There are no prior studies available for direct comparison.   PARANASAL SINUSES/MASTOIDS:  No hemorrhage or air-fluid levels within the visualized paranasal sinuses. The mastoids are well aerated.   CALVARIUM/ORBITS:  No skull fracture. The orbits and globes are intact to the extent  visualized.   EXTRACRANIAL SOFT TISSUES: There is soft tissue swelling along the lateral aspect of the left orbit and zygomatic arch.       Acute subdural hematoma overlying the right cerebral convexity measuring up to 1.1 cm in thickness at the right parietal lobe associated with trace sulcal subarachnoid hemorrhage immediately adjacent to the subdural. There are no prior studies available for comparison in the  system.     MACRO: None.   Signed by: Jairo Toro 7/4/2024 1:53 PM Dictation workstation:   [id number]    XR pelvis 1-2 views    Result Date: 7/4/2024  Interpreted By:  Saeid Maldonado, STUDY: XR PELVIS 1-2 VIEWS; ;  7/4/2024 10:06 am   INDICATION: Signs/Symptoms:trauma.   COMPARISON: None.   ACCESSION NUMBER(S): [id number]   ORDERING CLINICIAN: KARISSA ELIAS   FINDINGS: Pelvis, single portable view There is a mildly displaced left superior and left inferior pubic rami fractures. No other fracture. No dislocation. Degenerative changes in the hips       Mildly displaced fractures through the left superior and left inferior pubic rami     MACRO: None   Signed by: Saeid Maldonado 7/4/2024 12:27 PM Dictation workstation:   [id number]    XR chest 1 view    Result Date: 7/4/2024  Interpreted By:  Saeid Maldonado, STUDY: XR CHEST 1 VIEW;  7/4/2024 10:06 am   INDICATION: Signs/Symptoms:trauma.   COMPARISON: 03/14/2016   ACCESSION NUMBER(S): [id number]   ORDERING CLINICIAN: KARISSA ELIAS   FINDINGS: Left-sided pacemaker mediastinum wires present   Right IJ central venous line with its tip over the superior cavoatrial junction   CARDIOMEDIASTINAL SILHOUETTE: There is enlargement cardiac silhouette with moderate pulmonary edema.   LUNGS: Increased perihilar lung markings seen. There is no consolidation or effusion   ABDOMEN: No remarkable upper abdominal findings.   BONES: No acute osseous changes.       1.  Moderate pulmonary edema. Enlarged cardiac silhouette       MACRO: None   Signed by: Saeid Maldonado  7/4/2024 12:26 PM Dictation workstation:   [id number]    CT thoracic spine wo IV contrast    Result Date: 7/4/2024  * * *Final Report* * * DATE OF EXAM: Jul 4 2024  5:58AM   FVC   0485  -  CT T-SPINE W RECON DATA -NB  / ACCESSION #  [id number] PROCEDURE REASON: Spine fracture, thoracic, traumatic      * * * * Physician Interpretation * * * *  EXAMINATION:  CT CHEST W IVCON, CT ABD/PEL W IVCON, CT LUMBAR SPINE W RECON DATA -NB, CT T-SPINE W RECON DATA -NB CLINICAL HISTORY: Chest trauma, blunt (accession [id number]), Abdominal trauma, blunt (accession [id number]), Spine fracture, lumbar, traumatic   (accession [id number]), Spine fracture, thoracic, traumatic (accession [id number] Exam Date:  7/4/2024 5:58 AM Comparison: 04/09/2021 chest CT Contrast:  ml of Omnipaque 350 CT Radiation dose: Integrated Dose-length product (DLP) for this visit =   3074 mGy*cm CT Dose Reduction Employed: Automated exposure control (AEC) RESULT: Right IJ central venous line.  Left chest pacemaker.  Mild cardiomegaly.   Coronary artery calcifications.  Aortic valvular calcifications.   Surgical changes of CABG.  Intact aortic arch.  No mediastinal hematoma.   Patchy groundglass densities right upper lobe may be infectious or inflammatory.  No pneumothorax pleural effusion.  Multiple chronic right rib fractures.  Median sternotomy wires.  No acute left rib fractures. Multiple right lumbar transverse process fractures.  Acute left inferior and superior pubic rami fractures.  Left hemisacral zone 1 fracture.   Aortoiliac calcifications.  Colonic diverticulosis.  Space of Retzius hematoma with left aspect contrast blush coronal series 4 image 32.  Left urinary bladder wall thickening 11 mm or hematoma.  There is sigmoid colon fat inflammation.  Left pelvic rim-enhancing fluid collection 4.9 x 2.1 cm. Chronic superior endplate of L2 vertebral body fracture.    IMPRESSION: 1.  Chronic right rib fractures.  No acute rib fracture. 2.  Acute left  obturator fractures with associated zone 1 left hemisacral fracture. 3.  Space of Retzius hematoma with active hemorrhage along the left aspect of the hematoma. 4.  Left urinary bladder wall thickening, malignancy versus hematoma.   Correlate with direct visualization. 5.  Sigmoid colon diverticulitis with associated adjacent fluid collection representing abscess. URGENT RESULTS Acuity: Urgent Communication:  Communicated with CASTRO DE LA TORRE on  7/4/2024 6:58 AM  via verbal communication. : [facility]   Transcribe Date/Time: Jul 4 2024  6:38A Dictated by : STEFANI ZAIDI MD This examination was interpreted and the report reviewed and electronically signed by: STEFANI ZAIDI MD on Jul 4 2024  6:59AM  EST    CT LUMBAR SPINE W RECON DATA    Result Date: 7/4/2024  * * *Final Report* * * DATE OF EXAM: Jul 4 2024  5:58AM   FVC   0481  -  CT LUMBAR SPINE W RECON DATA -NB  / ACCESSION #  [id number] PROCEDURE REASON: Spine fracture, lumbar, traumatic      * * * * Physician Interpretation * * * *  EXAMINATION:  CT CHEST W IVCON, CT ABD/PEL W IVCON, CT LUMBAR SPINE W RECON DATA -NB, CT T-SPINE W RECON DATA -NB CLINICAL HISTORY: Chest trauma, blunt (accession [id number]), Abdominal trauma, blunt (accession [id number]), Spine fracture, lumbar, traumatic (accession [id number]), Spine fracture, thoracic, traumatic (accession [id number] Exam Date:  7/4/2024 5:58 AM Comparison: 04/09/2021 chest CT Contrast:  ml of Omnipaque 350 CT Radiation dose: Integrated Dose-length product (DLP) for this visit =   3074 mGy*cm CT Dose Reduction Employed: Automated exposure control (AEC) RESULT: Right IJ central venous line.  Left chest pacemaker.  Mild cardiomegaly.   Coronary artery calcifications.  Aortic valvular calcifications.   Surgical changes of CABG.  Intact aortic arch.  No mediastinal hematoma.   Patchy groundglass densities right upper lobe may be infectious or inflammatory.  No pneumothorax pleural effusion.   Multiple chronic right rib fractures.  Median sternotomy wires.  No acute left rib fractures. Multiple right lumbar transverse process fractures.  Acute left inferior and superior pubic rami fractures.  Left hemisacral zone 1 fracture.   Aortoiliac calcifications.  Colonic diverticulosis.  Space of Retzius hematoma with left aspect contrast blush coronal series 4 image 32.  Left urinary bladder wall thickening 11 mm or hematoma.  There is sigmoid colon fat inflammation.  Left pelvic rim-enhancing fluid collection 4.9 x 2.1 cm. Chronic superior endplate of L2 vertebral body fracture.    IMPRESSION: 1.  Chronic right rib fractures.  No acute rib fracture. 2.  Acute left obturator fractures with associated zone 1 left hemisacral fracture. 3.  Space of Retzius hematoma with active hemorrhage along the left aspect of the hematoma. 4.  Left urinary bladder wall thickening, malignancy versus hematoma.   Correlate with direct visualization. 5.  Sigmoid colon diverticulitis with associated adjacent fluid collection representing abscess. URGENT RESULTS Acuity: Urgent Communication:  Communicated with CASTRO DE LA TORRE on  7/4/2024 6:58 AM  via verbal communication. : [facility]   Transcribe Date/Time: Jul 4 2024  6:38A Dictated by : STEFANI ZAIDI MD This examination was interpreted and the report reviewed and electronically signed by: STEFANI ZAIDI MD on Jul 4 2024  6:59AM  EST    CT abdomen pelvis w IV contrast    Result Date: 7/4/2024  * * *Final Report* * * DATE OF EXAM: Jul 4 2024  5:58AM   FVC   0530  -  CT ABD/PEL W IVCON  / ACCESSION #  [id number] PROCEDURE REASON: Abdominal trauma, blunt      * * * * Physician Interpretation * * * *  EXAMINATION:  CT CHEST W IVCON, CT ABD/PEL W IVCON, CT LUMBAR SPINE W RECON DATA -NB, CT T-SPINE W RECON DATA -NB CLINICAL HISTORY: Chest trauma, blunt (accession [id number]), Abdominal trauma, blunt (accession [id number]), Spine fracture, lumbar, traumatic  (accession [id number]), Spine fracture, thoracic, traumatic (accession [id number] Exam Date:  7/4/2024 5:58 AM Comparison: 04/09/2021 chest CT Contrast:  ml of Omnipaque 350 CT Radiation dose: Integrated Dose-length product (DLP) for this visit =   3074 mGy*cm CT Dose Reduction Employed: Automated exposure control (AEC) RESULT: Right IJ central venous line.  Left chest pacemaker.  Mild cardiomegaly.   Coronary artery calcifications.  Aortic valvular calcifications.   Surgical changes of CABG.  Intact aortic arch.  No mediastinal hematoma.   Patchy groundglass densities right upper lobe may be infectious or inflammatory.  No pneumothorax pleural effusion.  Multiple chronic right rib fractures.  Median sternotomy wires.  No acute left rib fractures. Multiple right lumbar transverse process fractures.  Acute left inferior and superior pubic rami fractures.  Left hemisacral zone 1 fracture.   Aortoiliac calcifications.  Colonic diverticulosis.  Space of Retzius hematoma with left aspect contrast blush coronal series 4 image 32.  Left urinary bladder wall thickening 11 mm or hematoma.  There is sigmoid colon fat inflammation.  Left pelvic rim-enhancing fluid collection 4.9 x 2.1 cm. Chronic superior endplate of L2 vertebral body fracture.    IMPRESSION: 1.  Chronic right rib fractures.  No acute rib fracture. 2.  Acute left obturator fractures with associated zone 1 left hemisacral fracture. 3.  Space of Retzius hematoma with active hemorrhage along the left aspect of the hematoma. 4.  Left urinary bladder wall thickening, malignancy versus hematoma.   Correlate with direct visualization. 5.  Sigmoid colon diverticulitis with associated adjacent fluid collection representing abscess. URGENT RESULTS Acuity: Urgent Communication:  Communicated with CASTRO DE LA TORRE on  7/4/2024 6:58 AM  via verbal communication. : [place]   Transcribe Date/Time: Jul 4 2024  6:38A Dictated by : STEFANI ZAIDI MD This  examination was interpreted and the report reviewed and electronically signed by: STEFANI ZAIDI MD on Jul 4 2024  6:59AM  EST    CT chest w IV contrast    Result Date: 7/4/2024  * * *Final Report* * * DATE OF EXAM: Jul 4 2024  5:58AM   FVC   0539  -  CT CHEST W IVCON  / ACCESSION #  [id number] PROCEDURE REASON: Chest trauma, blunt      * * * * Physician Interpretation * * * *  EXAMINATION:  CT CHEST W IVCON, CT ABD/PEL W IVCON, CT LUMBAR SPINE W RECON DATA -NB, CT T-SPINE W RECON DATA -NB CLINICAL HISTORY: Chest trauma, blunt (accession [id number]), Abdominal trauma, blunt (accession [id number]), Spine fracture, lumbar, traumatic (accession [id number]), Spine fracture, thoracic, traumatic (accession [id number] Exam Date:  7/4/2024 5:58 AM Comparison: 04/09/2021 chest CT Contrast:  ml of Omnipaque 350 CT Radiation dose: Integrated Dose-length product (DLP) for this visit =   3074 mGy*cm CT Dose Reduction Employed: Automated exposure control (AEC) RESULT: Right IJ central venous line.  Left chest pacemaker.  Mild cardiomegaly.   Coronary artery calcifications.  Aortic valvular calcifications.   Surgical changes of CABG.  Intact aortic arch.  No mediastinal hematoma.   Patchy groundglass densities right upper lobe may be infectious or inflammatory.  No pneumothorax pleural effusion.  Multiple chronic right rib fractures.  Median sternotomy wires.  No acute left rib fractures.   Multiple right lumbar transverse process fractures.  Acute left inferior and superior pubic rami fractures.  Left hemisacral zone 1 fracture.   Aortoiliac calcifications.  Colonic diverticulosis.  Space of Retzius hematoma with left aspect contrast blush coronal series 4 image 32.  Left urinary bladder wall thickening 11 mm or hematoma.  There is sigmoid colon fat inflammation.  Left pelvic rim-enhancing fluid collection 4.9 x 2.1 cm. Chronic superior endplate of L2 vertebral body fracture.    IMPRESSION: 1.  Chronic right rib fractures.   No acute rib fracture. 2.  Acute left obturator fractures with associated zone 1 left hemisacral fracture. 3.  Space of Retzius hematoma with active hemorrhage along the left aspect of the hematoma. 4.  Left urinary bladder wall thickening, malignancy versus hematoma.   Correlate with direct visualization. 5.  Sigmoid colon diverticulitis with associated adjacent fluid collection representing abscess. URGENT RESULTS Acuity: Urgent Communication:  Communicated with CASTRO DE LA TORRE on  7/4/2024 6:58 AM  via verbal communication. : [facility]   Transcribe Date/Time: Jul 4 2024  6:38A Dictated by : STEFANI ZAIDI MD This examination was interpreted and the report reviewed and electronically signed by: STEFANI ZAIDI MD on Jul 4 2024  6:59AM  EST    XR chest 1 view    Result Date: 7/4/2024  * * *Final Report* * * DATE OF EXAM: Jul 4 2024  5:51AM   FVX   5376  -  XR CHEST 1V FRONTAL PORT  / ACCESSION #  [id number] PROCEDURE REASON: Chest pain, nonspecific      * * * * Physician Interpretation * * * *  EXAMINATION:  CHEST RADIOGRAPH (PORTABLE SINGLE VIEW AP) Exam Date/Time:  7/4/2024 5:51 AM CLINICAL HISTORY: Chest pain, nonspecific, Polytrauma, blunt, Chest trauma MQ:  XCPR_5 Comparison:  02/19/2024. RESULT: Lines, tubes, and devices:  Right IJ approach central venous catheter extends to the region of the SVC.  Left-sided pacemaker. Lungs and pleura:  Mild diffuse prominence of the pulmonary vasculature throughout both lungs.  No dense consolidation, pleural effusion, or pneumothorax. Cardiomediastinal silhouette:  Stable cardiomegaly. Other:  No other concerning change from prior.    IMPRESSION: See result. : [facility]   Transcribe Date/Time: Jul 4 2024  6:32A Dictated by : JOHN BILLINGSLEY MD This examination was interpreted and the report reviewed and electronically signed by: JOHN BILLINGSLEY MD on Jul 4 2024  6:42AM  EST    CT maxillofacial bones wo IV contrast    Result Date: 7/4/2024  *  * *Final Report* * * DATE OF EXAM: Jul 4 2024  5:58AM   FVC   0507  -  CT FACIAL BONE/GILL WO IVCON  / ACCESSION #  [id number] PROCEDURE REASON: Facial trauma, blunt      * * * * Physician Interpretation * * * *  EXAMINATION:  CT BRAIN WO IVCON, CT CERVICAL SPINE WO IVCON, CT FACIAL BONE/GILL WO IVCON CLINICAL HISTORY: TECHNIQUE:  Serial axial unenhanced images were obtained from the  vertex to the foramen magnum.  Spiral, high resolution axial unenhanced images were obtained from the skull base to the cervicothoracic junction with sagittal and coronal planar reconstructions.  Spiral high resolution axial unenhanced images were also obtained through the facial bones with sagittal and coronal planar reconstructions. MQ:  CTBCSFBWO_3 Dose-Length Product (DLP): 3074 mGy*cm. CT Dose Reduction Employed: Automated exposure control (AEC) COMPARISON:  None. RESULT: BRAIN: Acute change:   No evidence of an acute contusion or other acute parenchymal process. Hemorrhage: As discussed below. Mass lesion / Mass effect:   There is no evidence of an intracranial mass or extraaxial fluid collection.  No significant mass effect. Chronic change:   Scattered patchy foci of low attenuation are present within supratentorial white matter which is a nonspecific finding but likely represents mild microvascular ischemia. Parenchyma:  There is mild to moderate generalized volume loss.  The brain parenchyma is otherwise within normal limits for age. Ventricles:   Ventricular enlargement concordant with the degree of parenchymal volume loss. Paranasal sinuses and skull base:  The visualized paranasal sinuses are grossly clear.  The skull base and visualized extracranial soft tissues are grossly normal. CERVICAL: Counting reference:  Craniocervical junction.   Anatomic Variants:  None. Alignment: Minimal retrolisthesis of C4 on C5 and C5 on C6.  Otherwise, some degree of nonspecific lordosis straightening Craniocervical junction:     Craniocervical junction is normal. Osseous structures/fracture:    No evidence of a lytic or blastic process in the visualized spine.  No evidence of acute or chronic fracture. Cervical soft tissues:    The paraspinal soft tissues planes are maintained. Degenerative changes: Age-related degenerative changes which are most significant at C3-4, C4-5 and C5-6 where lordosis and superimposed calcified discogenic disease causes mild to moderate central canal stenosis at C3-4 secondary to calcified central disc protrusion and lordosis, mild to moderate possibly as much as moderate at C4-5 related to listhesis and calcified left central protrusion, and mild to moderate at C5-6 secondary to calcified disc bulging lordosis.  Partially imaged small right pleural effusion.  Moderate calcification of the bilateral proximal ICA bulbs. FACIAL BONES: Soft Tissues:  Left periorbital soft tissue swelling. Facial bones:  No evidence of an acute fracture in the visualized facial bones. Orbits:  No evidence of an acute fracture.  The globes are intact.  The soft tissue planes of the orbits are maintained. Paranasal Sinuses:  The paranasal sinuses are clear. Foreign Bodies:  No evidence of radioopaque foreign bodies. Other:  No evidence of a remote fracture.  No lytic or blastic process seen in the facial bones.    IMPRESSION: Acute right convexity extra-axial hematoma which measures approximately 9 mm in maximum dimension and causes local mass effect but no midline shift.  Although this has a somewhat convex appearance; this is still overall somewhat favored to reflect subdural hematoma with other possibility being epidural hemorrhage.  Additionally, this has some degree of internal low density which could reflect active bleeding.   Close imaging surveillance and neurologic exam is recommended. No acute cervical spine or facial bone fracture.  Please note that facial bone CT is partially Limited specifically regarding the mandible  secondary to patient motion Partially imaged small right effusion. Findings discussed with Dr. SMITH at 6:30 AM Anatomic Variant:  None.  Assume 7 cervical vertebrae with counting from the craniocervical junction. : [facility]   Transcribe Date/Time: Jul 4 2024  6:11A Dictated by : RAMEZ RIVERO MD This examination was interpreted and the report reviewed and electronically signed by: RAMEZ RIVERO MD on Jul 4 2024  6:35AM  EST    CT cervical spine wo IV contrast    Result Date: 7/4/2024  * * *Final Report* * * DATE OF EXAM: Jul 4 2024  5:58AM   FVC   0505  -  CT CERVICAL SPINE WO IVCON  / ACCESSION #  [id number] PROCEDURE REASON: Spine fracture, cervical, traumatic      * * * * Physician Interpretation * * * *  EXAMINATION:  CT BRAIN WO IVCON, CT CERVICAL SPINE WO IVCON, CT FACIAL BONE/GILL WO IVCON CLINICAL HISTORY: TECHNIQUE:  Serial axial unenhanced images were obtained from the  vertex to the foramen magnum.  Spiral, high resolution axial unenhanced images   were obtained from the skull base to the cervicothoracic junction with sagittal and coronal planar reconstructions.  Spiral high resolution axial unenhanced images were also obtained through the facial bones with sagittal and coronal planar reconstructions. MQ:  CTBCSFBWO_3 Dose-Length Product (DLP): 3074 mGy*cm. CT Dose Reduction Employed: Automated exposure control (AEC) COMPARISON:  None. RESULT: BRAIN: Acute change:   No evidence of an acute contusion or other acute parenchymal process. Hemorrhage: As discussed below. Mass lesion / Mass effect:   There is no evidence of an intracranial mass or extraaxial fluid collection.  No significant mass effect. Chronic change:   Scattered patchy foci of low attenuation are present within supratentorial white matter which is a nonspecific finding but likely represents mild microvascular ischemia. Parenchyma:  There is mild to moderate generalized volume loss.  The brain parenchyma is otherwise  within normal limits for age. Ventricles:   Ventricular enlargement concordant with the degree of parenchymal volume loss. Paranasal sinuses and skull base:  The visualized paranasal sinuses are grossly clear.  The skull base and visualized extracranial soft tissues are grossly normal. CERVICAL: Counting reference:  Craniocervical junction.   Anatomic Variants:  None. Alignment: Minimal retrolisthesis of C4 on C5 and C5 on C6.  Otherwise, some degree of nonspecific lordosis straightening Craniocervical junction:    Craniocervical junction is normal. Osseous structures/fracture:    No evidence of a lytic or blastic process in the visualized spine.  No evidence of acute or chronic fracture. Cervical soft tissues:    The paraspinal soft tissues planes are maintained. Degenerative changes: Age-related degenerative changes which are most significant at C3-4, C4-5 and C5-6 where lordosis and superimposed calcified discogenic disease causes mild to moderate central canal stenosis at C3-4 secondary to calcified central disc protrusion and lordosis, mild to moderate possibly as much as moderate at C4-5 related to listhesis and calcified left central protrusion, and mild to moderate at C5-6 secondary to calcified disc bulging lordosis. Partially imaged small right pleural effusion.  Moderate calcification of the bilateral proximal ICA bulbs. FACIAL BONES: Soft Tissues:  Left periorbital soft tissue swelling. Facial bones:  No evidence of an acute fracture in the visualized facial bones. Orbits:  No evidence of an acute fracture.  The globes are intact.  The soft tissue planes of the orbits are maintained. Paranasal Sinuses:  The paranasal sinuses are clear. Foreign Bodies:  No evidence of radioopaque foreign bodies. Other:  No evidence of a remote fracture.  No lytic or blastic process seen in the facial bones.    IMPRESSION: Acute right convexity extra-axial hematoma which measures approximately 9 mm in maximum dimension and  causes local mass effect but no midline shift.  Although this has a somewhat convex appearance; this is still overall somewhat favored to reflect subdural hematoma with other possibility being epidural hemorrhage.  Additionally, this has some degree of internal low density which could reflect active bleeding.   Close imaging surveillance and neurologic exam is recommended. No acute cervical spine or facial bone fracture.  Please note that facial bone CT is partially Limited specifically regarding the mandible secondary to patient motion Partially imaged small right effusion. Findings discussed with Dr. SMITH at 6:30 AM Anatomic Variant:  None.  Assume 7 cervical vertebrae with counting from the craniocervical junction. : [facility]   Transcribe Date/Time: Jul 4 2024  6:11A Dictated by : RAMEZ RIVERO MD This examination was interpreted and the report reviewed and electronically signed by: RAMEZ RIVERO MD on Jul 4 2024  6:35AM  EST    CT head wo IV contrast    Result Date: 7/4/2024  * * *Final Report* * * DATE OF EXAM: Jul 4 2024  5:58AM   FVC   0504  -  CT BRAIN WO IVCON  / ACCESSION #  [id number] PROCEDURE REASON: Head trauma, moderate-severe      * * * * Physician Interpretation * * * *  EXAMINATION:  CT BRAIN WO IVCON, CT CERVICAL SPINE WO IVCON, CT FACIAL BONE/GILL WO IVCON CLINICAL HISTORY: TECHNIQUE:  Serial axial unenhanced images were obtained from the  vertex to the foramen magnum.  Spiral, high resolution axial unenhanced images were obtained from the skull base to the cervicothoracic junction with sagittal and coronal planar reconstructions.  Spiral high resolution axial unenhanced images were also obtained through the facial bones with sagittal and coronal planar reconstructions. MQ:  CTBCSFBWO_3 Dose-Length Product (DLP): 3074 mGy*cm. CT Dose Reduction Employed: Automated exposure control (AEC) COMPARISON:  None. RESULT: BRAIN: Acute change:   No evidence of an acute contusion or  other acute parenchymal process. Hemorrhage: As discussed below. Mass lesion / Mass effect:   There is no evidence of an intracranial mass or extraaxial fluid collection.  No significant mass effect. Chronic change:   Scattered patchy foci of low attenuation are present within supratentorial white matter which is a nonspecific finding but likely represents mild microvascular ischemia. Parenchyma:  There is mild to moderate generalized volume loss.  The brain parenchyma is otherwise within normal limits for age.   Ventricles:   Ventricular enlargement concordant with the degree of parenchymal volume loss. Paranasal sinuses and skull base:  The visualized paranasal sinuses are grossly clear.  The skull base and visualized extracranial soft tissues are grossly normal. CERVICAL: Counting reference:  Craniocervical junction.   Anatomic Variants:  None. Alignment: Minimal retrolisthesis of C4 on C5 and C5 on C6.  Otherwise, some degree of nonspecific lordosis straightening Craniocervical junction:    Craniocervical junction is normal. Osseous structures/fracture:    No evidence of a lytic or blastic process in the visualized spine.  No evidence of acute or chronic fracture. Cervical soft tissues:    The paraspinal soft tissues planes are maintained. Degenerative changes: Age-related degenerative changes which are most significant at C3-4, C4-5 and C5-6 where lordosis and superimposed calcified discogenic disease causes mild to moderate central canal stenosis at C3-4 secondary to calcified central disc protrusion and lordosis, mild to moderate possibly as much as moderate at C4-5 related to listhesis and calcified left central protrusion, and mild to moderate at C5-6 secondary to calcified disc bulging lordosis. Partially imaged small right pleural effusion.  Moderate calcification of the bilateral proximal ICA bulbs. FACIAL BONES: Soft Tissues:  Left periorbital soft tissue swelling. Facial bones:  No evidence of an acute  fracture in the visualized facial bones. Orbits:  No evidence of an acute fracture.  The globes are intact.  The soft tissue planes of the orbits are maintained. Paranasal Sinuses:  The paranasal sinuses are clear. Foreign Bodies:  No evidence of radioopaque foreign bodies. Other:  No evidence of a remote fracture.  No lytic or blastic process seen in the facial bones.    IMPRESSION: Acute right convexity extra-axial hematoma which measures approximately 9 mm in maximum dimension and causes local mass effect but no midline shift.  Although this has a somewhat convex appearance; this is still overall somewhat favored to reflect subdural hematoma with other possibility being epidural hemorrhage.  Additionally, this has some degree of internal low density which could reflect active bleeding.   Close imaging surveillance and neurologic exam is recommended. No acute cervical spine or facial bone fracture.  Please note that facial bone CT is partially Limited specifically regarding the mandible secondary to patient motion Partially imaged small right effusion. Findings discussed with Dr. SMITH at 6:30 AM Anatomic Variant:  None.  Assume 7 cervical vertebrae with counting from the craniocervical junction. : [place]   Transcribe Date/Time: Jul 4 2024  6:11A Dictated by : RAMEZ RIVERO MD This examination was interpreted and the report reviewed and electronically signed by: RAMEZ RIVERO MD on Jul 4 2024  6:35AM  EST    XR pelvis 1-2 views    Result Date: 7/4/2024  * * *Final Report* * * DATE OF EXAM: Jul 4 2024  5:51AM   FVX   5239  -  XR PELVIS 1V AP  / ACCESSION #  [id number] PROCEDURE REASON: Pelvic trauma      * * * * Physician Interpretation * * * *  EXAM: XR PELVIS 1V AP PATIENT HISTORY:  Pelvic trauma TECHNIQUE:  AP view of the pelvis was obtained. COMPARISON:  No relevant prior available FINDINGS: Acute left parasymphyseal fracture with mild displacement. The right and left femoral head are  normally located.  Complete loss of joint space at the left hip.    IMPRESSION: 1.  Acute left parasymphyseal fracture. 2.  Severe left hip osteoarthritis. : [facility]   Transcribe Date/Time: Jul 4 2024  6:32A Dictated by : JOHN PAUL MD This examination was interpreted and the report reviewed and electronically signed by: JOHN PAUL MD on Jul 4 2024  6:35AM  EST      Imaging:   L incomplete LC1 pelvic ring injury      A/P: 74F (CAD, pulm HTN, Afib on Tikosyn and Eliquis, COPD, complete heart block s/p pacemaker) [facility] transfer after mGLF with a L incomplete LC1 pelvic ring injury. Also w/ SDH, R rib fxs, pelvic hematoma stable on CTA. Closed, NVI. XR/CT w/ above. If patient fails trial of ambulation in the coming days, may be a candidate for operative intervention for pelvic pain relief.     - No acute orthopedic intervention  - WB: WBAT LLE  - Abx: No indication from ortho perspective  - Diet: Per TICU  - DVT: Per TICU    - Dispo: F/u OP w/ Dr. Jaffe in 2 weeks    Discussed with attending on call, Dr. Jaffe.    Please do not hesitate to page with additional questions or concerns.    ------------------------------------------------------    Domingo Quinones MD  Orthopaedic Surgery, PGY-2  Available by Epic Chat  07/04/24  3:07 PM    This patient will be followed by Ortho Trauma team (All chat preferred):     1st call: Konrad Butterfield, PGY-1  1st call: David Silva, PGY-1  2nd call: David Correia PGY-2  3rd call: Domingo Quinones PGY-3        After 5 pm and on weekends, please page the on-call resident ([phone]) with questions or concerns.      07/04/24  This consult was seen and staffed within 30 minutes of the initial consult.

## 2024-07-05 ENCOUNTER — APPOINTMENT (OUTPATIENT)
Dept: CARDIOLOGY | Facility: HOSPITAL | Age: 74
DRG: 963 | End: 2024-07-05
Payer: MEDICARE

## 2024-07-05 ENCOUNTER — APPOINTMENT (OUTPATIENT)
Dept: RADIOLOGY | Facility: HOSPITAL | Age: 74
DRG: 963 | End: 2024-07-05
Payer: MEDICARE

## 2024-07-05 LAB
ALBUMIN SERPL BCP-MCNC: 2.7 G/DL (ref 3.4–5)
ALBUMIN SERPL BCP-MCNC: 3 G/DL (ref 3.4–5)
ALBUMIN SERPL BCP-MCNC: 3.1 G/DL (ref 3.4–5)
ALP SERPL-CCNC: 83 U/L (ref 33–136)
ALT SERPL W P-5'-P-CCNC: 10 U/L (ref 7–45)
ANION GAP BLDA CALCULATED.4IONS-SCNC: 12 MMO/L (ref 10–25)
ANION GAP BLDV CALCULATED.4IONS-SCNC: 14 MMOL/L (ref 10–25)
ANION GAP SERPL CALC-SCNC: 13 MMOL/L (ref 10–20)
ANION GAP SERPL CALC-SCNC: 16 MMOL/L (ref 10–20)
ANION GAP SERPL CALC-SCNC: 18 MMOL/L (ref 10–20)
APPEARANCE UR: ABNORMAL
APTT PPP: 31 SECONDS (ref 27–38)
AST SERPL W P-5'-P-CCNC: 10 U/L (ref 9–39)
ATRIAL RATE: 441 BPM
BASE EXCESS BLDA CALC-SCNC: -5.1 MMOL/L (ref -2–3)
BASE EXCESS BLDV CALC-SCNC: -6.5 MMOL/L (ref -2–3)
BASOPHILS # BLD AUTO: 0.06 X10*3/UL (ref 0–0.1)
BASOPHILS NFR BLD AUTO: 0.4 %
BILIRUB SERPL-MCNC: 0.9 MG/DL (ref 0–1.2)
BILIRUB UR STRIP.AUTO-MCNC: NEGATIVE MG/DL
BODY TEMPERATURE: 37 DEGREES CELSIUS
BODY TEMPERATURE: 37 DEGREES CELSIUS
BUN SERPL-MCNC: 32 MG/DL (ref 6–23)
BUN SERPL-MCNC: 33 MG/DL (ref 6–23)
BUN SERPL-MCNC: 35 MG/DL (ref 6–23)
BURR CELLS BLD QL SMEAR: NORMAL
CA-I BLDA-SCNC: 1.21 MMOL/L (ref 1.1–1.33)
CA-I BLDV-SCNC: 1.17 MMOL/L (ref 1.1–1.33)
CALCIUM SERPL-MCNC: 7.9 MG/DL (ref 8.6–10.6)
CALCIUM SERPL-MCNC: 8.3 MG/DL (ref 8.6–10.6)
CALCIUM SERPL-MCNC: 8.4 MG/DL (ref 8.6–10.6)
CHLORIDE BLDA-SCNC: 101 MMOL/L (ref 98–107)
CHLORIDE BLDV-SCNC: 100 MMOL/L (ref 98–107)
CHLORIDE SERPL-SCNC: 100 MMOL/L (ref 98–107)
CHLORIDE SERPL-SCNC: 103 MMOL/L (ref 98–107)
CHLORIDE SERPL-SCNC: 97 MMOL/L (ref 98–107)
CHLORIDE UR-SCNC: <15 MMOL/L
CHLORIDE/CREATININE (MMOL/G) IN URINE: NORMAL
CO2 SERPL-SCNC: 19 MMOL/L (ref 21–32)
CO2 SERPL-SCNC: 20 MMOL/L (ref 21–32)
CO2 SERPL-SCNC: 22 MMOL/L (ref 21–32)
COLOR UR: ABNORMAL
CREAT SERPL-MCNC: 1.98 MG/DL (ref 0.5–1.05)
CREAT SERPL-MCNC: 2.51 MG/DL (ref 0.5–1.05)
CREAT SERPL-MCNC: 2.82 MG/DL (ref 0.5–1.05)
CREAT UR-MCNC: 67.7 MG/DL (ref 20–320)
EGFRCR SERPLBLD CKD-EPI 2021: 17 ML/MIN/1.73M*2
EGFRCR SERPLBLD CKD-EPI 2021: 20 ML/MIN/1.73M*2
EGFRCR SERPLBLD CKD-EPI 2021: 26 ML/MIN/1.73M*2
EOSINOPHIL # BLD AUTO: 0.04 X10*3/UL (ref 0–0.4)
EOSINOPHIL NFR BLD AUTO: 0.2 %
ERYTHROCYTE [DISTWIDTH] IN BLOOD BY AUTOMATED COUNT: 21 % (ref 11.5–14.5)
ERYTHROCYTE [DISTWIDTH] IN BLOOD BY AUTOMATED COUNT: 21.4 % (ref 11.5–14.5)
GLUCOSE BLD MANUAL STRIP-MCNC: 102 MG/DL (ref 74–99)
GLUCOSE BLD MANUAL STRIP-MCNC: 112 MG/DL (ref 74–99)
GLUCOSE BLD MANUAL STRIP-MCNC: 132 MG/DL (ref 74–99)
GLUCOSE BLD MANUAL STRIP-MCNC: 152 MG/DL (ref 74–99)
GLUCOSE BLDA-MCNC: 133 MG/DL (ref 74–99)
GLUCOSE BLDV-MCNC: 123 MG/DL (ref 74–99)
GLUCOSE SERPL-MCNC: 123 MG/DL (ref 74–99)
GLUCOSE SERPL-MCNC: 132 MG/DL (ref 74–99)
GLUCOSE SERPL-MCNC: 94 MG/DL (ref 74–99)
GLUCOSE UR STRIP.AUTO-MCNC: NORMAL MG/DL
HCO3 BLDA-SCNC: 19.8 MMOL/L (ref 22–26)
HCO3 BLDV-SCNC: 19.7 MMOL/L (ref 22–26)
HCT VFR BLD AUTO: 29.7 % (ref 36–46)
HCT VFR BLD AUTO: 30.6 % (ref 36–46)
HCT VFR BLD EST: 29 % (ref 36–46)
HCT VFR BLD EST: 30 % (ref 36–46)
HGB BLD-MCNC: 9.4 G/DL (ref 12–16)
HGB BLD-MCNC: 9.6 G/DL (ref 12–16)
HGB BLDA-MCNC: 9.5 G/DL (ref 12–16)
HGB BLDV-MCNC: 10.1 G/DL (ref 12–16)
HYALINE CASTS #/AREA URNS AUTO: ABNORMAL /LPF
IMM GRANULOCYTES # BLD AUTO: 0.21 X10*3/UL (ref 0–0.5)
IMM GRANULOCYTES NFR BLD AUTO: 1.3 % (ref 0–0.9)
INHALED O2 CONCENTRATION: 100 %
INHALED O2 CONCENTRATION: 40 %
INR PPP: 1.6 (ref 0.9–1.1)
KETONES UR STRIP.AUTO-MCNC: NEGATIVE MG/DL
LACTATE BLDA-SCNC: 1 MMOL/L (ref 0.4–2)
LACTATE BLDV-SCNC: 1.6 MMOL/L (ref 0.4–2)
LEUKOCYTE ESTERASE UR QL STRIP.AUTO: ABNORMAL
LYMPHOCYTES # BLD AUTO: 0.53 X10*3/UL (ref 0.8–3)
LYMPHOCYTES NFR BLD AUTO: 3.2 %
MAGNESIUM SERPL-MCNC: 1.5 MG/DL (ref 1.6–2.4)
MAGNESIUM SERPL-MCNC: 2.08 MG/DL (ref 1.6–2.4)
MCH RBC QN AUTO: 23.2 PG (ref 26–34)
MCH RBC QN AUTO: 23.6 PG (ref 26–34)
MCHC RBC AUTO-ENTMCNC: 31.4 G/DL (ref 32–36)
MCHC RBC AUTO-ENTMCNC: 31.6 G/DL (ref 32–36)
MCV RBC AUTO: 73 FL (ref 80–100)
MCV RBC AUTO: 75 FL (ref 80–100)
MIXED CELL CASTS #/AREA UR COMP ASSIST: ABNORMAL /LPF
MONOCYTES # BLD AUTO: 0.71 X10*3/UL (ref 0.05–0.8)
MONOCYTES NFR BLD AUTO: 4.2 %
MUCOUS THREADS #/AREA URNS AUTO: ABNORMAL /LPF
NEUTROPHILS # BLD AUTO: 15.22 X10*3/UL (ref 1.6–5.5)
NEUTROPHILS NFR BLD AUTO: 90.7 %
NITRITE UR QL STRIP.AUTO: NEGATIVE
NRBC BLD-RTO: 0.1 /100 WBCS (ref 0–0)
NRBC BLD-RTO: 0.2 /100 WBCS (ref 0–0)
OXYHGB MFR BLDA: 94 % (ref 94–98)
OXYHGB MFR BLDV: 46.5 % (ref 45–75)
PCO2 BLDA: 35 MM HG (ref 38–42)
PCO2 BLDV: 41 MM HG (ref 41–51)
PH BLDA: 7.36 PH (ref 7.38–7.42)
PH BLDV: 7.29 PH (ref 7.33–7.43)
PH UR STRIP.AUTO: 5 [PH]
PHOSPHATE SERPL-MCNC: 3.8 MG/DL (ref 2.5–4.9)
PHOSPHATE SERPL-MCNC: 4.6 MG/DL (ref 2.5–4.9)
PHOSPHATE SERPL-MCNC: 4.8 MG/DL (ref 2.5–4.9)
PLATELET # BLD AUTO: 147 X10*3/UL (ref 150–450)
PLATELET # BLD AUTO: 151 X10*3/UL (ref 150–450)
PO2 BLDA: 75 MM HG (ref 85–95)
PO2 BLDV: 31 MM HG (ref 35–45)
POTASSIUM BLDA-SCNC: 6.2 MMOL/L (ref 3.5–5.3)
POTASSIUM BLDV-SCNC: 6.3 MMOL/L (ref 3.5–5.3)
POTASSIUM SERPL-SCNC: 5.3 MMOL/L (ref 3.5–5.3)
POTASSIUM SERPL-SCNC: 5.6 MMOL/L (ref 3.5–5.3)
POTASSIUM SERPL-SCNC: 5.8 MMOL/L (ref 3.5–5.3)
POTASSIUM UR-SCNC: 62 MMOL/L
POTASSIUM/CREAT UR-RTO: 92 MMOL/G CREAT
PROT SERPL-MCNC: 4.9 G/DL (ref 6.4–8.2)
PROT UR STRIP.AUTO-MCNC: ABNORMAL MG/DL
PROTHROMBIN TIME: 17.9 SECONDS (ref 9.8–12.8)
Q ONSET: 228 MS
QRS COUNT: 12 BEATS
QRS DURATION: 92 MS
QT INTERVAL: 448 MS
QTC CALCULATION(BAZETT): 483 MS
QTC FREDERICIA: 471 MS
R AXIS: -45 DEGREES
RBC # BLD AUTO: 4.06 X10*6/UL (ref 4–5.2)
RBC # BLD AUTO: 4.07 X10*6/UL (ref 4–5.2)
RBC # UR STRIP.AUTO: ABNORMAL /UL
RBC #/AREA URNS AUTO: >20 /HPF
RBC MORPH BLD: NORMAL
RENAL EPI CELLS #/AREA UR COMP ASSIST: ABNORMAL /HPF
SAO2 % BLDA: 97 % (ref 94–100)
SAO2 % BLDV: 48 % (ref 45–75)
SODIUM BLDA-SCNC: 127 MMOL/L (ref 136–145)
SODIUM BLDV-SCNC: 127 MMOL/L (ref 136–145)
SODIUM SERPL-SCNC: 129 MMOL/L (ref 136–145)
SODIUM SERPL-SCNC: 130 MMOL/L (ref 136–145)
SODIUM SERPL-SCNC: 132 MMOL/L (ref 136–145)
SODIUM UR-SCNC: 15 MMOL/L
SODIUM/CREAT UR-RTO: 22 MMOL/G CREAT
SP GR UR STRIP.AUTO: 1.03
SQUAMOUS #/AREA URNS AUTO: ABNORMAL /HPF
T AXIS: 114 DEGREES
T OFFSET: 452 MS
UROBILINOGEN UR STRIP.AUTO-MCNC: NORMAL MG/DL
VENTRICULAR RATE: 70 BPM
WBC # BLD AUTO: 16.8 X10*3/UL (ref 4.4–11.3)
WBC # BLD AUTO: 17.4 X10*3/UL (ref 4.4–11.3)
WBC #/AREA URNS AUTO: ABNORMAL /HPF

## 2024-07-05 PROCEDURE — 97530 THERAPEUTIC ACTIVITIES: CPT | Mod: GP

## 2024-07-05 PROCEDURE — 36415 COLL VENOUS BLD VENIPUNCTURE: CPT

## 2024-07-05 PROCEDURE — 71045 X-RAY EXAM CHEST 1 VIEW: CPT

## 2024-07-05 PROCEDURE — 85027 COMPLETE CBC AUTOMATED: CPT

## 2024-07-05 PROCEDURE — 71045 X-RAY EXAM CHEST 1 VIEW: CPT | Performed by: RADIOLOGY

## 2024-07-05 PROCEDURE — 85025 COMPLETE CBC W/AUTO DIFF WBC: CPT

## 2024-07-05 PROCEDURE — 2500000002 HC RX 250 W HCPCS SELF ADMINISTERED DRUGS (ALT 637 FOR MEDICARE OP, ALT 636 FOR OP/ED): Performed by: INTERNAL MEDICINE

## 2024-07-05 PROCEDURE — 2500000005 HC RX 250 GENERAL PHARMACY W/O HCPCS

## 2024-07-05 PROCEDURE — 83735 ASSAY OF MAGNESIUM: CPT

## 2024-07-05 PROCEDURE — 97166 OT EVAL MOD COMPLEX 45 MIN: CPT | Mod: GO

## 2024-07-05 PROCEDURE — 2020000001 HC ICU ROOM DAILY

## 2024-07-05 PROCEDURE — 84132 ASSAY OF SERUM POTASSIUM: CPT

## 2024-07-05 PROCEDURE — 2500000004 HC RX 250 GENERAL PHARMACY W/ HCPCS (ALT 636 FOR OP/ED): Performed by: STUDENT IN AN ORGANIZED HEALTH CARE EDUCATION/TRAINING PROGRAM

## 2024-07-05 PROCEDURE — 99291 CRITICAL CARE FIRST HOUR: CPT | Performed by: STUDENT IN AN ORGANIZED HEALTH CARE EDUCATION/TRAINING PROGRAM

## 2024-07-05 PROCEDURE — 97530 THERAPEUTIC ACTIVITIES: CPT | Mod: GO

## 2024-07-05 PROCEDURE — 82435 ASSAY OF BLOOD CHLORIDE: CPT

## 2024-07-05 PROCEDURE — 93005 ELECTROCARDIOGRAM TRACING: CPT

## 2024-07-05 PROCEDURE — 84100 ASSAY OF PHOSPHORUS: CPT

## 2024-07-05 PROCEDURE — 99291 CRITICAL CARE FIRST HOUR: CPT

## 2024-07-05 PROCEDURE — 2500000001 HC RX 250 WO HCPCS SELF ADMINISTERED DRUGS (ALT 637 FOR MEDICARE OP)

## 2024-07-05 PROCEDURE — 81001 URINALYSIS AUTO W/SCOPE: CPT

## 2024-07-05 PROCEDURE — 93010 ELECTROCARDIOGRAM REPORT: CPT | Performed by: INTERNAL MEDICINE

## 2024-07-05 PROCEDURE — 99291 CRITICAL CARE FIRST HOUR: CPT | Performed by: SURGERY

## 2024-07-05 PROCEDURE — 99232 SBSQ HOSP IP/OBS MODERATE 35: CPT | Performed by: NEUROLOGICAL SURGERY

## 2024-07-05 PROCEDURE — 36600 WITHDRAWAL OF ARTERIAL BLOOD: CPT

## 2024-07-05 PROCEDURE — 85610 PROTHROMBIN TIME: CPT

## 2024-07-05 PROCEDURE — 2500000004 HC RX 250 GENERAL PHARMACY W/ HCPCS (ALT 636 FOR OP/ED)

## 2024-07-05 PROCEDURE — 80069 RENAL FUNCTION PANEL: CPT | Mod: CCI

## 2024-07-05 PROCEDURE — 82947 ASSAY GLUCOSE BLOOD QUANT: CPT

## 2024-07-05 PROCEDURE — 97162 PT EVAL MOD COMPLEX 30 MIN: CPT | Mod: GP

## 2024-07-05 PROCEDURE — 2500000002 HC RX 250 W HCPCS SELF ADMINISTERED DRUGS (ALT 637 FOR MEDICARE OP, ALT 636 FOR OP/ED)

## 2024-07-05 PROCEDURE — 70450 CT HEAD/BRAIN W/O DYE: CPT | Performed by: RADIOLOGY

## 2024-07-05 PROCEDURE — 82436 ASSAY OF URINE CHLORIDE: CPT | Performed by: STUDENT IN AN ORGANIZED HEALTH CARE EDUCATION/TRAINING PROGRAM

## 2024-07-05 PROCEDURE — 87086 URINE CULTURE/COLONY COUNT: CPT

## 2024-07-05 RX ORDER — FUROSEMIDE 10 MG/ML
80 INJECTION INTRAMUSCULAR; INTRAVENOUS ONCE
Status: COMPLETED | OUTPATIENT
Start: 2024-07-05 | End: 2024-07-05

## 2024-07-05 RX ORDER — LAMOTRIGINE 100 MG/1
100 TABLET ORAL DAILY
Status: DISCONTINUED | OUTPATIENT
Start: 2024-07-05 | End: 2024-07-17 | Stop reason: HOSPADM

## 2024-07-05 RX ORDER — POLYETHYLENE GLYCOL 3350 17 G/17G
17 POWDER, FOR SOLUTION ORAL DAILY PRN
Status: DISCONTINUED | OUTPATIENT
Start: 2024-07-05 | End: 2024-07-17 | Stop reason: HOSPADM

## 2024-07-05 RX ORDER — AZELASTINE 1 MG/ML
1 SPRAY, METERED NASAL 2 TIMES DAILY
Status: DISCONTINUED | OUTPATIENT
Start: 2024-07-05 | End: 2024-07-17 | Stop reason: HOSPADM

## 2024-07-05 RX ORDER — MAGNESIUM SULFATE HEPTAHYDRATE 40 MG/ML
2 INJECTION, SOLUTION INTRAVENOUS ONCE
Status: COMPLETED | OUTPATIENT
Start: 2024-07-05 | End: 2024-07-05

## 2024-07-05 RX ORDER — METHOCARBAMOL 500 MG/1
500 TABLET, FILM COATED ORAL 2 TIMES DAILY PRN
Status: DISCONTINUED | OUTPATIENT
Start: 2024-07-05 | End: 2024-07-17 | Stop reason: HOSPADM

## 2024-07-05 RX ORDER — DEXTROSE 50 % IN WATER (D50W) INTRAVENOUS SYRINGE
25 ONCE
Status: COMPLETED | OUTPATIENT
Start: 2024-07-05 | End: 2024-07-05

## 2024-07-05 RX ORDER — ACETAMINOPHEN 325 MG/1
975 TABLET ORAL 3 TIMES DAILY
Status: DISCONTINUED | OUTPATIENT
Start: 2024-07-06 | End: 2024-07-17 | Stop reason: HOSPADM

## 2024-07-05 RX ORDER — HYDROMORPHONE HYDROCHLORIDE 1 MG/ML
0.4 INJECTION, SOLUTION INTRAMUSCULAR; INTRAVENOUS; SUBCUTANEOUS
Status: DISCONTINUED | OUTPATIENT
Start: 2024-07-05 | End: 2024-07-17 | Stop reason: HOSPADM

## 2024-07-05 RX ORDER — OXYCODONE HYDROCHLORIDE 5 MG/1
2.5 TABLET ORAL EVERY 4 HOURS PRN
Status: DISCONTINUED | OUTPATIENT
Start: 2024-07-05 | End: 2024-07-08

## 2024-07-05 RX ORDER — ESCITALOPRAM OXALATE 10 MG/1
10 TABLET ORAL DAILY
Status: DISCONTINUED | OUTPATIENT
Start: 2024-07-05 | End: 2024-07-17 | Stop reason: HOSPADM

## 2024-07-05 RX ORDER — PREGABALIN 75 MG/1
75 CAPSULE ORAL 2 TIMES DAILY
Status: DISCONTINUED | OUTPATIENT
Start: 2024-07-05 | End: 2024-07-17 | Stop reason: HOSPADM

## 2024-07-05 RX ORDER — PREDNISONE 5 MG/1
10 TABLET ORAL DAILY
Status: DISCONTINUED | OUTPATIENT
Start: 2024-07-05 | End: 2024-07-17 | Stop reason: HOSPADM

## 2024-07-05 RX ORDER — ALPRAZOLAM 0.5 MG/1
0.5 TABLET ORAL DAILY PRN
Status: DISCONTINUED | OUTPATIENT
Start: 2024-07-05 | End: 2024-07-05

## 2024-07-05 RX ORDER — OXYCODONE HYDROCHLORIDE 5 MG/1
5 TABLET ORAL EVERY 4 HOURS PRN
Status: DISCONTINUED | OUTPATIENT
Start: 2024-07-05 | End: 2024-07-08

## 2024-07-05 RX ORDER — DEXTROSE MONOHYDRATE 100 MG/ML
50 INJECTION, SOLUTION INTRAVENOUS CONTINUOUS
Status: DISPENSED | OUTPATIENT
Start: 2024-07-05 | End: 2024-07-05

## 2024-07-05 RX ORDER — TORSEMIDE 20 MG/1
40 TABLET ORAL 2 TIMES DAILY
Status: DISCONTINUED | OUTPATIENT
Start: 2024-07-05 | End: 2024-07-05

## 2024-07-05 RX ORDER — FUROSEMIDE 10 MG/ML
20 INJECTION INTRAMUSCULAR; INTRAVENOUS ONCE
Status: COMPLETED | OUTPATIENT
Start: 2024-07-05 | End: 2024-07-05

## 2024-07-05 RX ADMIN — ONDANSETRON 4 MG: 2 INJECTION INTRAMUSCULAR; INTRAVENOUS at 03:11

## 2024-07-05 RX ADMIN — ACETAMINOPHEN 650 MG: 325 TABLET ORAL at 06:18

## 2024-07-05 RX ADMIN — INSULIN HUMAN 10 UNITS: 100 INJECTION, SOLUTION PARENTERAL at 07:20

## 2024-07-05 RX ADMIN — ACETAMINOPHEN 650 MG: 325 TABLET ORAL at 17:37

## 2024-07-05 RX ADMIN — AZELASTINE HYDROCHLORIDE 1 SPRAY: 137 SPRAY, METERED NASAL at 15:46

## 2024-07-05 RX ADMIN — PANTOPRAZOLE SODIUM 40 MG: 40 TABLET, DELAYED RELEASE ORAL at 08:02

## 2024-07-05 RX ADMIN — SODIUM ZIRCONIUM CYCLOSILICATE 10 G: 10 POWDER, FOR SUSPENSION ORAL at 21:53

## 2024-07-05 RX ADMIN — DEXTROSE MONOHYDRATE 50 ML/HR: 100 INJECTION, SOLUTION INTRAVENOUS at 07:27

## 2024-07-05 RX ADMIN — LEVETIRACETAM 500 MG: 500 TABLET, FILM COATED ORAL at 08:03

## 2024-07-05 RX ADMIN — PREDNISONE 10 MG: 20 TABLET ORAL at 15:48

## 2024-07-05 RX ADMIN — ESCITALOPRAM 10 MG: 10 TABLET, FILM COATED ORAL at 15:48

## 2024-07-05 RX ADMIN — SODIUM CHLORIDE, POTASSIUM CHLORIDE, SODIUM LACTATE AND CALCIUM CHLORIDE 500 ML: 600; 310; 30; 20 INJECTION, SOLUTION INTRAVENOUS at 09:50

## 2024-07-05 RX ADMIN — SILDENAFIL 20 MG: 20 TABLET ORAL at 08:03

## 2024-07-05 RX ADMIN — LAMOTRIGINE 100 MG: 100 TABLET ORAL at 15:45

## 2024-07-05 RX ADMIN — MAGNESIUM SULFATE HEPTAHYDRATE 2 G: 40 INJECTION, SOLUTION INTRAVENOUS at 03:58

## 2024-07-05 RX ADMIN — PREGABALIN 75 MG: 25 CAPSULE ORAL at 21:53

## 2024-07-05 RX ADMIN — SILDENAFIL 20 MG: 20 TABLET ORAL at 21:53

## 2024-07-05 RX ADMIN — FUROSEMIDE 20 MG: 10 INJECTION, SOLUTION INTRAMUSCULAR; INTRAVENOUS at 17:46

## 2024-07-05 RX ADMIN — TORSEMIDE 40 MG: 20 TABLET ORAL at 15:45

## 2024-07-05 RX ADMIN — ACETAMINOPHEN 650 MG: 325 TABLET ORAL at 11:34

## 2024-07-05 RX ADMIN — PREGABALIN 75 MG: 25 CAPSULE ORAL at 15:45

## 2024-07-05 RX ADMIN — FUROSEMIDE 80 MG: 10 INJECTION, SOLUTION INTRAMUSCULAR; INTRAVENOUS at 21:42

## 2024-07-05 RX ADMIN — LEVETIRACETAM 500 MG: 500 TABLET, FILM COATED ORAL at 21:53

## 2024-07-05 RX ADMIN — AZELASTINE HYDROCHLORIDE 1 SPRAY: 137 SPRAY, METERED NASAL at 21:52

## 2024-07-05 RX ADMIN — DEXTROSE MONOHYDRATE 25 G: 25 INJECTION, SOLUTION INTRAVENOUS at 07:23

## 2024-07-05 RX ADMIN — SILDENAFIL 20 MG: 20 TABLET ORAL at 15:45

## 2024-07-05 ASSESSMENT — COGNITIVE AND FUNCTIONAL STATUS - GENERAL
PERSONAL GROOMING: A LITTLE
EATING MEALS: A LITTLE
CLIMB 3 TO 5 STEPS WITH RAILING: TOTAL
MOVING TO AND FROM BED TO CHAIR: TOTAL
STANDING UP FROM CHAIR USING ARMS: A LITTLE
DRESSING REGULAR UPPER BODY CLOTHING: A LITTLE
DRESSING REGULAR LOWER BODY CLOTHING: A LOT
WALKING IN HOSPITAL ROOM: TOTAL
TURNING FROM BACK TO SIDE WHILE IN FLAT BAD: A LITTLE
HELP NEEDED FOR BATHING: A LOT
DAILY ACTIVITIY SCORE: 15
MOVING FROM LYING ON BACK TO SITTING ON SIDE OF FLAT BED WITH BEDRAILS: A LITTLE
MOBILITY SCORE: 12
TOILETING: A LOT

## 2024-07-05 ASSESSMENT — PAIN SCALES - GENERAL
PAINLEVEL_OUTOF10: 10 - WORST POSSIBLE PAIN
PAINLEVEL_OUTOF10: 0 - NO PAIN

## 2024-07-05 ASSESSMENT — PAIN - FUNCTIONAL ASSESSMENT
PAIN_FUNCTIONAL_ASSESSMENT: 0-10

## 2024-07-05 ASSESSMENT — ACTIVITIES OF DAILY LIVING (ADL)
ADL_ASSISTANCE: INDEPENDENT
BATHING_ASSISTANCE: MODERATE

## 2024-07-05 NOTE — PROGRESS NOTES
Pharmacy Admission Order Reconciliation Review    Bhargavi Isidro is a 74 y.o. female admitted for SDH (subdural hematoma) (Multi). Pharmacy reviewed the patient's unreconciled admission medications.    Prior to admission medications that were reviewed and acted on by the pharmacist include:  Apixaban  Sildenafil  Loperamide  Magnesium oxide  protonix  These medications have been reconciled.     Any other unreconcilied medications have been addressed and will be ordered or held by the patient's medical team. Medications addressed by the pharmacist may be added or changed by the patient's medical team at any time.    Kathie Ward, PharmD  Transitions of Care Pharmacist  [facility]  Please reach out via Secure Chat for questions

## 2024-07-05 NOTE — PROGRESS NOTES
Occupational Therapy    Evaluation and Treatment    Patient Name: Bhargavi Isidro  MRN: [id number]  Today's Date: 7/5/2024  Room: 02/02  Time Calculation  Start Time: 1141  Stop Time: 1232  Time Calculation (min): 51 min    Assessment  IP OT Assessment  OT Assessment: Impaired self-care and functional mobility. WOuld benefit from skilled services to maximize functional potential.  Prognosis: Good  Barriers to Discharge: Decreased caregiver support, Inaccessible home environment  Evaluation/Treatment Tolerance: Patient limited by pain  Medical Staff Made Aware: Yes  End of Session Communication: Bedside nurse  End of Session Patient Position: Bed, 3 rail up, Alarm off, not on at start of session  Plan:  Inpatient Plan  Treatment Interventions: ADL retraining, Functional transfer training, Endurance training, UE strengthening/ROM, Neuromuscular reeducation  OT Frequency: 2 times per week  OT Discharge Recommendations: Moderate intensity level of continued care  Equipment Recommended upon Discharge: Wheeled walker  OT Recommended Transfer Status: Minimal assist, Assist of 2  OT - OK to Discharge: Yes  OT Assessment  OT Assessment Results: Decreased ADL status, Decreased upper extremity strength, Decreased endurance, Decreased functional mobility  Prognosis: Good  Barriers to Discharge: Decreased caregiver support, Inaccessible home environment  Evaluation/Treatment Tolerance: Patient limited by pain  Medical Staff Made Aware: Yes    Subjective   Current Problem:  1. SDH (subdural hematoma) (Multi)  CT head wo IV contrast      2. Fall, initial encounter        3. Hemorrhage of pelvic artery        4. Pulmonary HTN (Multi)          General:  Reason for Referral: 73 y/o admitted after fall, resulting in left incomplete lateral compression pelvic ring injury, R subdural hematoma, and pelvic hematoma.  Past Medical History Relevant to Rehab: HTN, HLD, CAD , STEMI s/p CABG, diastolic heart failure, congenital heart block s/p  pacemeaker, PAH, Afib, CKD stage IV, chronc hypoxic resp failure, COPD (on home 5L NC), LEONEL, OA, chronic spine degen, GERD  Co-Treatment: PT  Co-Treatment Reason: To maximize patient safety and mobility  Prior to Session Communication: Bedside nurse  Patient Position Received: Bed, 3 rail up, Alarm off, not on at start of session  General Comment: Pt is pleasant and cooperative. Increasing L hip pain upon standing. She puts forth good effort to her tolerance.   Precautions:  LE Weight Bearing Status:  (WBAT LLE)  Medical Precautions: Fall precautions, Oxygen therapy device and L/min (11L HFNC)  Vital Signs:  Heart Rate: 71 (72)  Resp: 20 (22)  SpO2: 90 % (90)  BP: 100/82 (112/44)  MAP (mmHg): 86 (60)  BP Location: Right arm  BP Method: Automatic  Pain:  Pain Assessment  Pain Assessment: 0-10  0-10 (Numeric) Pain Score:  (0/10 at rest. 10/10 during WB)  Pain Type: Acute pain  Pain Location: Hip  Pain Orientation: Left  Lines/Tubes/Drains:  Urethral Catheter (Active)   Number of days: 1         Objective   Cognition:  Overall Cognitive Status: Within Functional Limits  Arousal/Alertness: Appropriate responses to stimuli  Orientation Level: Oriented X4  Following Commands: Follows all commands and directions without difficulty           Home Living:  Type of Home: House  Lives With: Alone  Home Adaptive Equipment: Cane, Walker rolling or standard  Home Layout: One level  Home Access: Stairs to enter without rails  Entrance Stairs-Number of Steps: 1+1   Prior Function:  Level of [place]: Independent with ADLs and functional transfers, Independent with homemaking with ambulation  Receives Help From: Friends  ADL Assistance: Independent  Homemaking Assistance: Independent  Ambulatory Assistance: Independent  Vocational: Retired (nurse)  Leisure: Selling things on [employer]  Prior Function Comments: (+)drives. One fall, leading to this admission.  IADL History:     ADL:  Grooming Assistance: Stand by  Grooming Deficit:  Setup  Bathing Assistance: Moderate  Bathing Deficit: Setup, Supervision/safety, Increased time to complete   UE Dressing Assistance: Stand by  LE Dressing Assistance: Maximal  LE Dressing Deficit: Don/doff R sock, Don/doff L sock, Supervision/safety, Increased time to complete  Toileting Assistance with Device: Maximal  Activity Tolerance:  Endurance: Decreased tolerance for upright activites  Early Mobility/Exercise Safety Screen: Proceed with mobilization - No exclusion criteria met  Balance:  Static Sitting Balance  Static Sitting-Level of Assistance: Contact guard, Close supervision  Static Standing Balance  Static Standing-Level of Assistance: Minimum assistance  Bed Mobility/Transfers: Bed Mobility/Transfers: Bed Mobility  Bed Mobility: Yes  Bed Mobility 1  Bed Mobility 1: Supine to sitting  Level of Assistance 1: Minimum assistance  Bed Mobility Comments 1: Cues for hand placement and technique  Bed Mobility 2  Bed Mobility  2: Scooting  Level of Assistance 2: Close supervision  Bed Mobility Comments 2: Able to complete 3 big scoots towrds HOB while seated EOB  Bed Mobility 3  Bed Mobility 3: Sitting to supine  Level of Assistance 3: Minimum assistance  Bed Mobility Comments 3: Assist mainly to manage BLE into bed  Functional Mobility  Functional Mobility Performed: No (Limited by pain)   and Transfers  Transfer: Yes  Transfer 1  Transfer From 1: Sit to  Transfer to 1: Stand  Technique 1: Sit to stand, Stand to sit  Transfer Device 1: Walker  Transfer Level of Assistance 1: Minimum assistance  Trials/Comments 1: Cues for hand placement and technique. Increased L hip pain upon standing.  IADL's:      Vision:     and Vision - Complex Assessment  Ocular Range of Motion: Within Functional Limits  Sensation:     Strength:  Strength Comments: L shld: 3-/5. All others 4-/5.  Perception:  Inattention/Neglect: Appears intact  Coordination:  Movements are Fluid and Coordinated: Yes   Hand Function:  Hand  Function  Gross Grasp: Functional  Coordination: Functional  Extremities:    ,  ,  , and        Outcome Measures: [facility] Daily Activity  Putting on and taking off regular lower body clothing: A lot  Bathing (including washing, rinsing, drying): A lot  Putting on and taking off regular upper body clothing: A little  Toileting, which includes using toilet, bedpan or urinal: A lot  Taking care of personal grooming such as brushing teeth: A little  Eating Meals: A little  Daily Activity - Total Score: 15        ICU Mobility Screen  Early Mobility/Exercise Safety Screen: Proceed with mobilization - No exclusion criteria met,          Education Documentation  Body Mechanics, taught by Bian Brandon OT at 7/5/2024  3:50 PM.  Learner: Patient  Readiness: Acceptance  Method: Explanation  Response: Verbalizes Understanding    Precautions, taught by Bina Brandon OT at 7/5/2024  3:50 PM.  Learner: Patient  Readiness: Acceptance  Method: Explanation  Response: Verbalizes Understanding    ADL Training, taught by Bina Brandon OT at 7/5/2024  3:50 PM.  Learner: Patient  Readiness: Acceptance  Method: Explanation  Response: Verbalizes Understanding    Education Comments  No comments found.        Goals:   Encounter Problems       Encounter Problems (Active)       ADLs       Patient will complete lower body dressing with MIN A for donning and doffing all LE clothes in order to increase Indep with task participation.        Start:  07/05/24    Expected End:  07/19/24            Patient will complete toileting, including clothing management and hygiene, with MIN A in order to maximize functional Indep with task completion.        Start:  07/05/24    Expected End:  07/19/24               BALANCE       Pt will increase static/dynamic stand to Good to increase safety and indep with functional task completion.         Start:  07/05/24    Expected End:  07/19/24               EXERCISE/STRENGTHENING       Pt will increase overall  BUE strength to 4+/5 in order to increase functional task participation.        Start:  07/05/24    Expected End:  07/19/24            Pt will increase endurance to tolerate 15-20min of activity with no more than 1 rest break in order to increase ability to engage in ADL completion.        Start:  07/05/24    Expected End:  07/19/24               MOBILITY       Pt will demo increased functional mobility to tolerate tasks necessary to complete ADL routine with SBA and LRD.       Start:  07/05/24    Expected End:  07/19/24               TRANSFERS       Patient will complete functional transfers using least restrictive device with SBA  in order to maximize functional potential and increase safety.        Start:  07/05/24    Expected End:  07/19/24                   Treatment Completed on Evaluation               Therapy/Activity:     Therapeutic Activity  Therapeutic Activity Performed: Yes  Therapeutic Activity 1: Static stand using FWW with CGA. CGA for standing weight shifts. Increasing L hip pain with weght shifting. Attempted to take lateral step. Unable to complete d/t pain.  Therapeutic Activity 2: Static/dynamic EOB sit for approx 15min. Completed with CGA-MIN A. Attempted to reach towards LE to manage socks. Limited by pain and needed MAX A to don socks.               07/05/24 at 3:51 PM   Bina Brandon, OT   Rehab Office: [phone]

## 2024-07-05 NOTE — PROGRESS NOTES
"Bhargavi Isidro is a 74 y.o. female on day 1 of admission presenting with SDH (subdural hematoma) (Multi).    Subjective   NAEO       Objective     Physical Exam  NAD, A&Ox3  PERRL, EOMI, Face symmetric, Facial SILT, Palate/Tongue midline and symmetric,  RUE 5   LUE D4 (chronic) o/w 5  RLE 4+ (pain limited)  LLE 2 (pain limited)  SILT    Last Recorded Vitals  Blood pressure (!) 96/42, pulse 70, temperature 35.8 °C (96.4 °F), temperature source Temporal, resp. rate 12, height 1.651 m (5' 5\"), weight 63 kg (139 lb), SpO2 96%.  Intake/Output last 3 Shifts:  I/O last 3 completed shifts:  In: 913 (14.5 mL/kg) [I.V.:913 (14.5 mL/kg)]  Out: 665 (10.5 mL/kg) [Urine:665 (0.3 mL/kg/hr)]  Weight: 63 kg     Relevant Results                Results for orders placed or performed during the hospital encounter of 07/04/24 (from the past 24 hour(s))   Blood Gas Venous Full Panel Unsolicited   Result Value Ref Range    POCT pH, Venous 7.34 7.33 - 7.43 pH    POCT pCO2, Venous 40 (L) 41 - 51 mm Hg    POCT pO2, Venous 25 (L) 35 - 45 mm Hg    POCT SO2, Venous 33 (L) 45 - 75 %    POCT Oxy Hemoglobin, Venous 32.9 (L) 45.0 - 75.0 %    POCT Hematocrit Calculated, Venous 33.0 (L) 36.0 - 46.0 %    POCT Sodium, Venous 135 (L) 136 - 145 mmol/L    POCT Potassium, Venous 5.3 3.5 - 5.3 mmol/L    POCT Chloride, Venous 103 98 - 107 mmol/L    POCT Ionized Calicum, Venous 1.17 1.10 - 1.33 mmol/L    POCT Glucose, Venous 103 (H) 74 - 99 mg/dL    POCT Lactate, Venous 2.2 (H) 0.4 - 2.0 mmol/L    POCT Base Excess, Venous -3.9 (L) -2.0 - 3.0 mmol/L    POCT HCO3 Calculated, Venous 21.6 (L) 22.0 - 26.0 mmol/L    POCT Hemoglobin, Venous 11.1 (L) 12.0 - 16.0 g/dL    POCT Anion Gap, Venous 16.0 10.0 - 25.0 mmol/L    Patient Temperature 37.0 degrees Celsius   CBC and Auto Differential   Result Value Ref Range    WBC 22.0 (H) 4.4 - 11.3 x10*3/uL    nRBC 0.3 (H) 0.0 - 0.0 /100 WBCs    RBC 4.49 4.00 - 5.20 x10*6/uL    Hemoglobin 10.3 (L) 12.0 - 16.0 g/dL    Hematocrit " 32.8 (L) 36.0 - 46.0 %    MCV 73 (L) 80 - 100 fL    MCH 22.9 (L) 26.0 - 34.0 pg    MCHC 31.4 (L) 32.0 - 36.0 g/dL    RDW 21.2 (H) 11.5 - 14.5 %    Platelets 229 150 - 450 x10*3/uL    Neutrophils % 86.0 40.0 - 80.0 %    Immature Granulocytes %, Automated 1.3 (H) 0.0 - 0.9 %    Lymphocytes % 6.1 13.0 - 44.0 %    Monocytes % 5.9 2.0 - 10.0 %    Eosinophils % 0.5 0.0 - 6.0 %    Basophils % 0.2 0.0 - 2.0 %    Neutrophils Absolute 18.89 (H) 1.60 - 5.50 x10*3/uL    Immature Granulocytes Absolute, Automated 0.28 0.00 - 0.50 x10*3/uL    Lymphocytes Absolute 1.34 0.80 - 3.00 x10*3/uL    Monocytes Absolute 1.30 (H) 0.05 - 0.80 x10*3/uL    Eosinophils Absolute 0.11 0.00 - 0.40 x10*3/uL    Basophils Absolute 0.05 0.00 - 0.10 x10*3/uL   Protime-INR   Result Value Ref Range    Protime 19.8 (H) 9.8 - 12.8 seconds    INR 1.7 (H) 0.9 - 1.1   Comprehensive metabolic panel   Result Value Ref Range    Glucose 91 74 - 99 mg/dL    Sodium 136 136 - 145 mmol/L    Potassium 4.9 3.5 - 5.3 mmol/L    Chloride 104 98 - 107 mmol/L    Bicarbonate 20 (L) 21 - 32 mmol/L    Anion Gap 17 10 - 20 mmol/L    Urea Nitrogen 30 (H) 6 - 23 mg/dL    Creatinine 1.59 (H) 0.50 - 1.05 mg/dL    eGFR 34 (L) >60 mL/min/1.73m*2    Calcium 8.6 8.6 - 10.6 mg/dL    Albumin 3.3 (L) 3.4 - 5.0 g/dL    Alkaline Phosphatase 61 33 - 136 U/L    Total Protein 5.8 (L) 6.4 - 8.2 g/dL    AST 13 9 - 39 U/L    Bilirubin, Total 1.3 (H) 0.0 - 1.2 mg/dL    ALT 12 7 - 45 U/L   Creatine Kinase   Result Value Ref Range    Creatine Kinase 20 0 - 215 U/L   Bilirubin, Direct   Result Value Ref Range    Bilirubin, Direct 0.4 (H) 0.0 - 0.3 mg/dL   Morphology   Result Value Ref Range    RBC Morphology See Below     Ovalocytes Few    Lavender Top   Result Value Ref Range    Extra Tube Hold for add-ons.    POCT GLUCOSE   Result Value Ref Range    POCT Glucose 97 74 - 99 mg/dL   CBC   Result Value Ref Range    WBC 14.6 (H) 4.4 - 11.3 x10*3/uL    nRBC 0.3 (H) 0.0 - 0.0 /100 WBCs    RBC 3.87 (L)  4.00 - 5.20 x10*6/uL    Hemoglobin 9.1 (L) 12.0 - 16.0 g/dL    Hematocrit 30.0 (L) 36.0 - 46.0 %    MCV 78 (L) 80 - 100 fL    MCH 23.5 (L) 26.0 - 34.0 pg    MCHC 30.3 (L) 32.0 - 36.0 g/dL    RDW 21.3 (H) 11.5 - 14.5 %    Platelets 168 150 - 450 x10*3/uL   TEG Clot Global Profile   Result Value Ref Range    R (Reaction Time) K 8.9 4.6 - 9.1 min    K (Clot Kinetics) 1.7 0.8 - 2.1 min    ANGLE 70.0 63.0 - 78.0 deg    MA (Max Amplitude) K 63.0 52.0 - 69.0 mm    R (Reaction Time) KH 7.7 4.3 - 8.3 min    MA (Max Amplitude) RT 64.0 52.0 - 70.0 mm    MA ( Shanna Amplitude) FF 21.0 15.0 - 32.0 mm    FLEV 389 278 - 581 mg/dL   POCT GLUCOSE   Result Value Ref Range    POCT Glucose 101 (H) 74 - 99 mg/dL   CBC   Result Value Ref Range    WBC 15.3 (H) 4.4 - 11.3 x10*3/uL    nRBC 0.3 (H) 0.0 - 0.0 /100 WBCs    RBC 3.89 (L) 4.00 - 5.20 x10*6/uL    Hemoglobin 9.2 (L) 12.0 - 16.0 g/dL    Hematocrit 30.0 (L) 36.0 - 46.0 %    MCV 77 (L) 80 - 100 fL    MCH 23.7 (L) 26.0 - 34.0 pg    MCHC 30.7 (L) 32.0 - 36.0 g/dL    RDW 21.2 (H) 11.5 - 14.5 %    Platelets 179 150 - 450 x10*3/uL   Renal function panel   Result Value Ref Range    Glucose 94 74 - 99 mg/dL    Sodium 132 (L) 136 - 145 mmol/L    Potassium 5.6 (H) 3.5 - 5.3 mmol/L    Chloride 103 98 - 107 mmol/L    Bicarbonate 22 21 - 32 mmol/L    Anion Gap 13 10 - 20 mmol/L    Urea Nitrogen 32 (H) 6 - 23 mg/dL    Creatinine 1.98 (H) 0.50 - 1.05 mg/dL    eGFR 26 (L) >60 mL/min/1.73m*2    Calcium 8.3 (L) 8.6 - 10.6 mg/dL    Phosphorus 3.8 2.5 - 4.9 mg/dL    Albumin 3.0 (L) 3.4 - 5.0 g/dL   Magnesium   Result Value Ref Range    Magnesium 1.50 (L) 1.60 - 2.40 mg/dL   CBC   Result Value Ref Range    WBC 17.4 (H) 4.4 - 11.3 x10*3/uL    nRBC 0.1 (H) 0.0 - 0.0 /100 WBCs    RBC 4.06 4.00 - 5.20 x10*6/uL    Hemoglobin 9.4 (L) 12.0 - 16.0 g/dL    Hematocrit 29.7 (L) 36.0 - 46.0 %    MCV 73 (L) 80 - 100 fL    MCH 23.2 (L) 26.0 - 34.0 pg    MCHC 31.6 (L) 32.0 - 36.0 g/dL    RDW 21.0 (H) 11.5 - 14.5 %     Platelets 151 150 - 450 x10*3/uL   POCT GLUCOSE   Result Value Ref Range    POCT Glucose 152 (H) 74 - 99 mg/dL              Assessment/Plan   Principal Problem:    SDH (subdural hematoma) (Multi)    Bhargavi Isidro is a 74 y.o. female with h/o HTN, HLD, CAD , STEMI s/p CABG, diastolic heart failure, congenital heart block s/p pacemeaker, PAH, Afib ((on eliquis, s/p reversal at OSH), CKD stage IV, chronc hypoxic resp failure, COPD (on home 5L NC), LEONEL, OA, chronic spine degen, GERD, p/w fall, CTH SDH      rCTH stbale      Recs  Please obtain CTH 7/6 at 4 AM   ASA restart at PBD 7  Eliquis POD14   Ok for DVT prophylaxis PBD2              Opal Ríos MD

## 2024-07-05 NOTE — NURSING NOTE
Pt refused to start Remodulin IV infusion. Stated that she would just refill her cartridge with her meds from home that her friends are bringing tomorrow. MD aware, Pulmonology aware, stated currently she does not lack capacity. Concern for decreased neuro and ability to mix medications with no way to verify correct concentration. MD aware, pt aware of risks.

## 2024-07-05 NOTE — PROGRESS NOTES
Bhargavi Isidro is a 74 y.o. female on day 1 of admission presenting with SDH (subdural hematoma) (Multi).    Subjective   Patient seen at bedside in CICU bed 2. She has been stable from a SDH perspective but is having intermittent desaturations on 10L NC. Her baseline at rest is reported as 4L with 10-15L on exertion, however she states she keeps her O2 at 6L at all times and has saturations anywhere between 70%-90% at rest on that. She reports mild drowsiness today but is A&O4. She denies SOB, cough, wheezing, light headedness, dizziness, fever, chills or sweats. She continues to refuse switching her remodulin to the IV pump per  protocol, stating she will switch out her cassette herself tonight on schedule and do her own dilutions for it. She has decision making capacity at this time. She declines bipap (though also has facial trauma and would be a poor candidate for it anyway). She denies LE edema, difficulty urinating or feeling that she is retaining fluid.        Objective     Physical Exam  Constitutional:       General: She is not in acute distress.     Appearance: She is not toxic-appearing.   HENT:      Head:      Comments: Facial ecchymoses unchanged     Nose: No rhinorrhea.      Mouth/Throat:      Mouth: Mucous membranes are moist.      Pharynx: No posterior oropharyngeal erythema.   Eyes:      Extraocular Movements: Extraocular movements intact.      Conjunctiva/sclera: Conjunctivae normal.      Pupils: Pupils are equal, round, and reactive to light.   Cardiovascular:      Rate and Rhythm: Normal rate and regular rhythm.      Heart sounds: Murmur heard.      No friction rub.   Pulmonary:      Effort: Pulmonary effort is normal. No respiratory distress.      Breath sounds: No wheezing, rhonchi or rales.      Comments: Saturating 85-90% at bedside  Abdominal:      General: Bowel sounds are normal. There is no distension.      Palpations: Abdomen is soft.   Musculoskeletal:      Cervical back: Normal  "range of motion.      Comments: Trace bilateral LE edema and scattered ecchymoses bilateral upper and lower extremities   Skin:     General: Skin is warm and dry.      Comments: Saunders catheter clean/dry/intact   Neurological:      Mental Status: She is alert.      Comments: A&Ox4, normal upper and lower extremity strength, conversational   Psychiatric:         Mood and Affect: Mood normal.         Behavior: Behavior normal.         Thought Content: Thought content normal.         Last Recorded Vitals  Blood pressure (!) 107/46, pulse 70, temperature 36 °C (96.8 °F), temperature source Temporal, resp. rate 12, height 1.651 m (5' 5\"), weight 63 kg (139 lb), SpO2 91%.  Intake/Output last 3 Shifts:  I/O last 3 completed shifts:  In: 913 (14.5 mL/kg) [I.V.:913 (14.5 mL/kg)]  Out: 665 (10.5 mL/kg) [Urine:665 (0.3 mL/kg/hr)]  Weight: 63 kg     Relevant Results              Results for orders placed or performed during the hospital encounter of 07/04/24 (from the past 24 hour(s))   CBC   Result Value Ref Range    WBC 14.6 (H) 4.4 - 11.3 x10*3/uL    nRBC 0.3 (H) 0.0 - 0.0 /100 WBCs    RBC 3.87 (L) 4.00 - 5.20 x10*6/uL    Hemoglobin 9.1 (L) 12.0 - 16.0 g/dL    Hematocrit 30.0 (L) 36.0 - 46.0 %    MCV 78 (L) 80 - 100 fL    MCH 23.5 (L) 26.0 - 34.0 pg    MCHC 30.3 (L) 32.0 - 36.0 g/dL    RDW 21.3 (H) 11.5 - 14.5 %    Platelets 168 150 - 450 x10*3/uL   TEG Clot Global Profile   Result Value Ref Range    R (Reaction Time) K 8.9 4.6 - 9.1 min    K (Clot Kinetics) 1.7 0.8 - 2.1 min    ANGLE 70.0 63.0 - 78.0 deg    MA (Max Amplitude) K 63.0 52.0 - 69.0 mm    R (Reaction Time) KH 7.7 4.3 - 8.3 min    MA (Max Amplitude) RT 64.0 52.0 - 70.0 mm    MA ( Shanna Amplitude) FF 21.0 15.0 - 32.0 mm    FLEV 389 278 - 581 mg/dL   POCT GLUCOSE   Result Value Ref Range    POCT Glucose 101 (H) 74 - 99 mg/dL   CBC   Result Value Ref Range    WBC 15.3 (H) 4.4 - 11.3 x10*3/uL    nRBC 0.3 (H) 0.0 - 0.0 /100 WBCs    RBC 3.89 (L) 4.00 - 5.20 x10*6/uL    " Hemoglobin 9.2 (L) 12.0 - 16.0 g/dL    Hematocrit 30.0 (L) 36.0 - 46.0 %    MCV 77 (L) 80 - 100 fL    MCH 23.7 (L) 26.0 - 34.0 pg    MCHC 30.7 (L) 32.0 - 36.0 g/dL    RDW 21.2 (H) 11.5 - 14.5 %    Platelets 179 150 - 450 x10*3/uL   Renal function panel   Result Value Ref Range    Glucose 94 74 - 99 mg/dL    Sodium 132 (L) 136 - 145 mmol/L    Potassium 5.6 (H) 3.5 - 5.3 mmol/L    Chloride 103 98 - 107 mmol/L    Bicarbonate 22 21 - 32 mmol/L    Anion Gap 13 10 - 20 mmol/L    Urea Nitrogen 32 (H) 6 - 23 mg/dL    Creatinine 1.98 (H) 0.50 - 1.05 mg/dL    eGFR 26 (L) >60 mL/min/1.73m*2    Calcium 8.3 (L) 8.6 - 10.6 mg/dL    Phosphorus 3.8 2.5 - 4.9 mg/dL    Albumin 3.0 (L) 3.4 - 5.0 g/dL   Magnesium   Result Value Ref Range    Magnesium 1.50 (L) 1.60 - 2.40 mg/dL   CBC   Result Value Ref Range    WBC 17.4 (H) 4.4 - 11.3 x10*3/uL    nRBC 0.1 (H) 0.0 - 0.0 /100 WBCs    RBC 4.06 4.00 - 5.20 x10*6/uL    Hemoglobin 9.4 (L) 12.0 - 16.0 g/dL    Hematocrit 29.7 (L) 36.0 - 46.0 %    MCV 73 (L) 80 - 100 fL    MCH 23.2 (L) 26.0 - 34.0 pg    MCHC 31.6 (L) 32.0 - 36.0 g/dL    RDW 21.0 (H) 11.5 - 14.5 %    Platelets 151 150 - 450 x10*3/uL   ECG 12 Lead   Result Value Ref Range    Ventricular Rate 70 BPM    Atrial Rate 441 BPM    QRS Duration 92 ms    QT Interval 448 ms    QTC Calculation(Bazett) 483 ms    R Axis -45 degrees    T Axis 114 degrees    QRS Count 12 beats    Q Onset 228 ms    T Offset 452 ms    QTC Fredericia 471 ms   POCT GLUCOSE   Result Value Ref Range    POCT Glucose 152 (H) 74 - 99 mg/dL   POCT GLUCOSE   Result Value Ref Range    POCT Glucose 102 (H) 74 - 99 mg/dL   Renal function panel   Result Value Ref Range    Glucose 132 (H) 74 - 99 mg/dL    Sodium 130 (L) 136 - 145 mmol/L    Potassium 5.3 3.5 - 5.3 mmol/L    Chloride 100 98 - 107 mmol/L    Bicarbonate 19 (L) 21 - 32 mmol/L    Anion Gap 16 10 - 20 mmol/L    Urea Nitrogen 33 (H) 6 - 23 mg/dL    Creatinine 2.51 (H) 0.50 - 1.05 mg/dL    eGFR 20 (L) >60 mL/min/1.73m*2     Calcium 7.9 (L) 8.6 - 10.6 mg/dL    Phosphorus 4.6 2.5 - 4.9 mg/dL    Albumin 2.7 (L) 3.4 - 5.0 g/dL   POCT GLUCOSE   Result Value Ref Range    POCT Glucose 112 (H) 74 - 99 mg/dL             Assessment/Plan   Principal Problem:    SDH (subdural hematoma) (Multi)    74-year-old female with past medical history of Group I/II/III Pulmonary hypertension (on Remodulin pump and Sildenafil; follows with Dr. Blackwell [facility]), Diastolic congestive heart failure, atrial fibrillation (on eliquis), heart block s/p pacemaker, CAD s/p CABG, severe sleep disordered breathing (AHI 45) requiring BIPAP , mild obstructive lung disease/COPD and chronic hypoxic respiratory failure on 4-6L at rest and 10-12L on exertion at baseline who is admitted for fall with pelvic rami fracture, right rib fractures and subdural hematoma. Pulmonary is consulted for Pulmonary HTN.     # Group I/II/III Pulmonary Hypertension  - Follows with Dr. Blackwell [facility]  - Continue Sildenafil 20mg TID  - Continue Remodulin via Saunders and patient's home pump. Patient is not agreeable to receiving medication via hospital pump. She was told to not let anyone touch her catheter in the past at [facility] and is not agreeable to having us manage her Remodulin. Our big concern is her mentation in setting of SDH and severe sleep apnea. I have explained to the patient that administering the medication by herself may be unsafe given the acute and underlying medical conditions, particularly her hypoxemia, currently untreated LEONEL (nonadherent with bipap for past 1 month and currently unable to use due to facial trauma). There is concern that she could become acutely hypercapnic, increasingly hypoxic or have worsening mentation due to bleed and lose capacity/ability to manage her own pump. I also explained that we do not have the supplies to intervene if something goes wrong with her pump and that we will have a difficult time telling if she did her dilution correctly when she is doing  it on her own. The patient has been adamant about using her own cassettes and premixing it on her own. Currently, patient has the capacity to make her own decisions. She is due for cassette change today, 7/5 and family has brought her medication/equipment. I have explained to her that if we have any concern about her ability to do it herself, we will switch her to our IV pumps against her will. She acknowledges and agrees with this.  -Transfer to MICU initiated, pending MICU bed availability  -Please stop continuous fluids  -Patient declined transfer to [facility] for continuity of care    #Acute on chronic respiratory failure with hypoxemia - slightly above her baseline requirements which are 4-6L at rest and 10-15L on exertion  -Repeat CXR  -Diuresis if indicated based on pulmonary edema. Caution given preload dependence  -Please stop continuous fluids  -Incentive spirometry  -Out of bed as tolerated   -Goal O2 88-92%    #Severe LEONEL requiring bipap - unable to use currently due to facial trauma  -If lethargy or worsening mentation, please obtain ABG stat    #DEBORA on CKD with normal CK. Likely cardiorenal due to poor forward flow from PAH in setting of increasing volume overload  -Please stop continuous fluids  -Urine electrolytes  -Renal ultrasound  -Can attempt diuresis if needed    #Hyponatremia - likely due to volume overload  -Urine lytes  -Dc continuous fluids  -Monitor renal function daily      #Afib on Eliquis  -Reversed and held in setting of SDH, continue to hold    #SDH   -Treatment per NSGY    #Pelvic/rib fractures  -Treatment per ortho and trauma surgery    MICU transfer process initiated       I spent 45 minutes in the professional and overall care of this patient.      Cornelia Kaur,

## 2024-07-05 NOTE — NURSING NOTE
"Pt managing own IV Treprostinil. When asked pt about the medication, pt states, \"Let's not speak of it\". Pt family member to bring next medication dose from home. TSICU team aware and discussed while at bedside.   "

## 2024-07-05 NOTE — PROGRESS NOTES
[facility]  TRAUMA ICU - PROGRESS NOTE    Patient Name: Bhargavi Isidro  MRN: [id number]  Admit Date: 704  : 1950  AGE: 74 y.o.   GENDER: female  ==============================================================================   [###USE THIS SECTION FOR TRAUMA PATIENTS ONLY###]  MECHANISM OF INJURY:   Fall     LOC (yes/no)? no  Anticoagulant / Anti-platelet Rx? (for what dx?): Eliquis for paroxysmal aFib, reversed with feiba at OSH  Referring Facility Name (N/A for scene EMR run): [facility]     INJURIES:   SDH, right  L inferior pubic rami fracture     OTHER MEDICAL PROBLEMS:  Severe pulmonary HTN - on remodulin pump  CAD, underwent CABG  Paroxysmal Atrial fibrillation  COPD     INCIDENTAL FINDINGS:  Aortoiliac calcifications     PROCEDURES  None    ==============================================================================  TODAY'S ASSESSMENT AND PLAN OF CARE:  Bhargavi Isidro is a 74 y.o. female in the ICU due to: respiratory support and neurological monitoring     NEURO/PAIN/SEDATION:    - Tylenol 650mg PO q6h scheduled    - Oxycodone PO PRN   - Dilaudid 0.2mg q3h PRN for breakthrough pain    - q1h neurochecks    - restarted home Lamictal   - restarted home Lexapro    - restarted home prednisone    - restarted home Lyrica   - restarted home Robaxin   - Neurosurgery recommendations         - repeat CTH performed this AM, additional repeat CTH on  AM         - patient was reversed with Andexxa overnight         - can restart ASA at PBD 7, Eliquis PBD14, and DVT ppx PBD2    RESPIRATORY:   #pulmonary hypertension    - Sildenafil 20mg PO TID    - Patient refuses IV Remodulin, and plans to use home pump - order for patient supplied medication placed.    - IS q1h    - O2 goal 88-92%    CARDIOVASC:    - MAP goal >60   - given 500cc LR bolus this AM for softer pressures and MAPs in the 50s   - restarted on home torsemide     #history of Afib on Eliquis    - continue to hold  Eliquis     GI:    - transitioned to a regular diet     :    - martinez in place    - strict I/O monitoring     FEN:    - discontinued IV fluids    - continue daily RFP and Magnesium checks    - replete electrolytes as needed    - hyperkalemic this morning: given a K cocktail of insulin + dextrose and a EKG was performed    HEMATOLOGIC:    - Hgb stable 9.4 today    - continue to monitor Hgb - daily     ENDOCRINE:    - continue POCT glucose checks     MUSCULOSKELETAL/SKIN:   #Periorbital ecchymosis from fall    - monitor for any vision changes     #Pubic rami fractures    - ortho recommendations: WBAT LLE, no acute orthopedic intervention, follow up outpatient 2 weeks after discharge with Dr. Jaffe     INFECTIOUS DISEASE:    - remains afebrile   #Incidental diverticulitis with abscess    - Vancomycin started at OSH and discontinued on admission     GI PROPHYLAXIS: Pantoprazole     DVT PROPHYLAXIS: SCDs, continue to hold chemoprophylaxis     DISPOSITION: TSICU, pending transfer to the MICU for pulmonary hypertension management     Patient discussed with Dr. Anu Rucker PGY1  TSICU [id number]    ==============================================================================  CHIEF COMPLAINT / OVERNIGHT EVENTS / HPI:   Overnight Andexxa was given to reverse Eliquis, a repeat CTH, and Keppra was started per neurosurgery. This morning she was alert and endorsed minimal pain/discomfort. O2 saturations have been between 80s-90s on 15L HFNC with a goal of 88-92%. A little more sleepy today, but AAOx3 and responsive to commands. Her blood pressures are soft at home and continue to have SBPs in the 80s-100s. Denies chest pain, SOB, fevers, chills, nausea, vomiting.     MEDICAL HISTORY / ROS:  Admission history and ROS reviewed. Pertinent changes as follows:  Please see trauma HPI for full details.    PHYSICAL EXAM:  Heart Rate:  [70-78]   Temp:  [35.8 °C (96.4 °F)-36.9 °C (98.4 °F)]   Resp:  [12-25]   BP:  "()/(42-80)   Height:  [165.1 cm (5' 5\")]   Weight:  [63 kg (139 lb)]   SpO2:  [79 %-96 %]     Physical Exam  Constitutional:       General: She is not in acute distress.  HENT:      Head:      Comments: Periorbital bruising on the left  Cardiovascular:      Rate and Rhythm: Normal rate.      Comments: Pacemaker in place  Pulmonary:      Effort: Pulmonary effort is normal. No respiratory distress.   Abdominal:      General: There is no distension.      Palpations: Abdomen is soft.      Tenderness: There is abdominal tenderness (mild TP suprapubic and RLQ). There is no guarding or rebound.   Genitourinary:     Comments: Abbott in place  Skin:     Comments: Ecchymosis on bilateral upper extremities, present on admission    Neurological:      Mental Status: She is alert and oriented to person, place, and time.      Comments: Able to follow commands       IMAGING SUMMARY:  (summary of new imaging findings, not a copy of dictation)  [facility] 7/5: stable    LABS:  Results from last 7 days   Lab Units 07/05/24  0309 07/04/24  2326 07/04/24  1818 07/04/24  0958   WBC AUTO x10*3/uL 17.4* 15.3* 14.6* 22.0*   HEMOGLOBIN g/dL 9.4* 9.2* 9.1* 10.3*   HEMATOCRIT % 29.7* 30.0* 30.0* 32.8*   PLATELETS AUTO x10*3/uL 151 179 168 229   NEUTROS PCT AUTO %  --   --   --  86.0   LYMPHS PCT AUTO %  --   --   --  6.1   MONOS PCT AUTO %  --   --   --  5.9   EOS PCT AUTO %  --   --   --  0.5     Results from last 7 days   Lab Units 07/04/24  0958   INR  1.7*     Results from last 7 days   Lab Units 07/05/24  0209 07/04/24  0958   SODIUM mmol/L 132* 136   POTASSIUM mmol/L 5.6* 4.9   CHLORIDE mmol/L 103 104   CO2 mmol/L 22 20*   BUN mg/dL 32* 30*   CREATININE mg/dL 1.98* 1.59*   CALCIUM mg/dL 8.3* 8.6   PROTEIN TOTAL g/dL  --  5.8*   BILIRUBIN TOTAL mg/dL  --  1.3*   ALK PHOS U/L  --  61   ALT U/L  --  12   AST U/L  --  13   GLUCOSE mg/dL 94 91     Results from last 7 days   Lab Units 07/04/24  0958   BILIRUBIN TOTAL mg/dL 1.3*   BILIRUBIN " DIRECT mg/dL 0.4*         I have reviewed all medications, laboratory results, and imaging pertinent for today's encounter.   Seen, examined, reviewed chart on Team rounds today. Orders written.  Discussed with MICU team acceptance for transfer once bed opens.   35 minutes critical care time   Tony Brennan MD

## 2024-07-05 NOTE — PROGRESS NOTES
Bhargavi Isidro is a 74 y.o. female on day 1 of admission presenting with SDH (subdural hematoma) (Multi).  [facility]  TRAUMA ICU - PROGRESS NOTE    Patient Name: Bhargavi Isidro  MRN: [id number]  Admit Date: 704  : 1950                      AGE: 74 y.o.                             GENDER: female  ==============================================================================  MECHANISM OF INJURY:  Fall    LOC (yes/no)? no  Anticoagulant / Anti-platelet Rx? (for what dx?): Eliquis for paroxysmal aFib, reversed with feiba at OSH  Referring Facility Name (N/A for scene EMR run): [facility]     INJURIES:   SDH, right  L inferior pubic rami fracture     OTHER MEDICAL PROBLEMS:  Severe pulmonary HTN - on remodulin pump  CAD, underwent CABG  Paroxysmal Atrial fibrillation  COPD     INCIDENTAL FINDINGS:  Aortoiliac calcifications    PROCEDURES  None      ==============================================================================  TODAY'S ASSESSMENT AND PLAN OF CARE:  Bhargavi Isidro is a 74 y.o. female in the ICU due to respiratory support and neurological monitoring.     *Patient is refusing IV medications    NEURO/PAIN/SEDATION:    - Tylenol 650 mg PO every 6h scheduled    - Oxycodone PO PRN    - Dilaudid 0.2mg q3h PRN for breakthrough pain    - Neurosurgery consulted     -Please obtain CTH 7/6 at 4 AM   -ASA restart at PBD 7  Eliquis POD14   Ok for DVT prophylaxis PBD2       - repeat CTH 7/6 AM       - Andexxa for Eliquis reversal        - ASA on POD 14       - okay for q4h NC    RESPIRATORY:   #Pulmonary hypertension    - Sildenafil 20 mg PO TID   - Patient prefers to have her home Remodulin IV which will be refilled today by the patient. She states that her family member will bring the refill.    - SpO2 goal >80% (Patient states she at baseline is at Sp02 > 79% on 5LNC)  - IS, q1h     CARDIOVASC:    - MAP goal > 65    #History of Afib on Eliquis   - Eliquis held on  admission     GI:     - diet: diet as tolerated   - Zofran PRN for N/V    :    - Strict monitor of I/O   - martinez in place     HEMATOLOGIC:    - Liberalize to daily CBC    ENDOCRINE:     - POCT glucose Ada and pre-meal     FEN   - Discontinue mIVF when tolerating diet   - daily RFP and Mag   - replete electrolytes as needed     MUSCULOSKELETAL/SKIN:   #Periorbital ecchymosis from fall   - neurochecks q1h    - continue to monitor for vision changes    #Pubic rami fractures (superior and inferior)   - No acute surgical intervention per ortho. WBAT LLE   - IR consulted with no acute intervention recommended     INFECTIOUS DISEASE:    - afebrile   #Incidental Diverticulitis with abscess managed with Vancomycin    - Vancomycin discontinued on admission     GI PROPHYLAXIS: Pantoprazole     DVT PROPHYLAXIS: SCDs, holding chemoprophylaxis in setting of bleed    DISPOSITION: remain in the TSICU     Patient discussed with Dr. Estevan Dior MD  Trauma Fellow    ==============================================================================    CHIEF COMPLAINT/OVERNIGHT EVENTS/HPI  Please see trauma HPI for full details. Patient refuses IV medications. When arrived in the TSICU endorsed pain. O2 sats since being in the TSICU high 80s to low 90s.     MEDICAL HISTORY/ROS  Admission history and ROS reviewed. Pertinent changes as follows:   Please see trauma HPI for full details     PHYSICAL EXAM:  Heart Rate:  [70-78]   Temp:  [35.8 °C (96.4 °F)-36.3 °C (97.3 °F)]   Resp:  [12-25]   BP: ()/(37-82)   SpO2:  [88 %-96 %]     Physical Exam  Constitutional:       General: She is not in acute distress.  HENT:      Head:      Comments: Left sided ecchymosis (periorbital)   Cardiovascular:      Rate and Rhythm: Normal rate and regular rhythm.   Pulmonary:      Effort: Pulmonary effort is normal.      Breath sounds: Normal breath sounds.   Abdominal:      General: There is no distension.      Palpations: Abdomen is soft.       Tenderness: There is no abdominal tenderness.   Musculoskeletal:      Cervical back: Normal range of motion. No rigidity.      Comments: Able to move bilateral upper and lower extremities   Skin:     Findings: Bruising present.      Comments: Laceration L upper arm   Neurological:      Mental Status: She is alert and oriented to person, place, and time.      Comments: Follows commands - wiggles toes and squeezes fingers bilaterally       IMAGING SUMMARY:    CXR: pulmonary edema present without consolidations, cardiac silhouette is enlarged   XR pelvis: L superior and inferior pubic rami without dislocation   CTA AP: hematoma without contrast extravasation in the Space of Retzius, thickened bladder wall ?hematoma, fluid collection inferior abdomen near sigmoid diverticulosis - possible abscess, pubic rami fracture (superior and inferior on the left), transverse process fractures of L1 and L2 on the right, brit sacral fracture on the left zone 1.  XR ankle L: no fracture or dislocation  XR ankle R: no acute findings   XR pelvis: L superior and inferior pubic rami without dislocation   XR shoulder: no acute findings   XR knee: no acute findings    CT head: right sided subdural hematoma 1.1 cm     LABS:  Results from last 7 days   Lab Units 07/05/24  0309 07/04/24  2326 07/04/24  1818 07/04/24  0958   WBC AUTO x10*3/uL 17.4* 15.3* 14.6* 22.0*   HEMOGLOBIN g/dL 9.4* 9.2* 9.1* 10.3*   HEMATOCRIT % 29.7* 30.0* 30.0* 32.8*   PLATELETS AUTO x10*3/uL 151 179 168 229   NEUTROS PCT AUTO %  --   --   --  86.0   LYMPHS PCT AUTO %  --   --   --  6.1   MONOS PCT AUTO %  --   --   --  5.9   EOS PCT AUTO %  --   --   --  0.5     Results from last 7 days   Lab Units 07/04/24  0958   INR  1.7*     Results from last 7 days   Lab Units 07/05/24  0209 07/04/24  0958   SODIUM mmol/L 132* 136   POTASSIUM mmol/L 5.6* 4.9   CHLORIDE mmol/L 103 104   CO2 mmol/L 22 20*   BUN mg/dL 32* 30*   CREATININE mg/dL 1.98* 1.59*   CALCIUM mg/dL 8.3*  8.6   PROTEIN TOTAL g/dL  --  5.8*   BILIRUBIN TOTAL mg/dL  --  1.3*   ALK PHOS U/L  --  61   ALT U/L  --  12   AST U/L  --  13   GLUCOSE mg/dL 94 91     Results from last 7 days   Lab Units 07/04/24  0958   BILIRUBIN TOTAL mg/dL 1.3*   BILIRUBIN DIRECT mg/dL 0.4*         I have reviewed all medications, laboratory results, and imaging pertinent for today's encounter.         Lilliana Doir MD  Trauma Fellow

## 2024-07-05 NOTE — PROGRESS NOTES
Physical Therapy    Physical Therapy Evaluation & Treatment    Patient Name: Bhargavi Isidro  MRN: [id number]  Today's Date: 7/5/2024   Time Calculation  Start Time: 1141  Stop Time: 1235  Time Calculation (min): 54 min    Assessment/Plan     PT Assessment  PT Assessment Results: Decreased strength, Decreased range of motion, Decreased endurance, Impaired balance, Decreased mobility, Pain  Rehab Prognosis: Good  Evaluation/Treatment Tolerance: Patient limited by pain  End of Session Communication: Bedside nurse  End of Session Patient Position: Bed, 3 rail up, Alarm off, not on at start of session   IP OR SWING BED PT PLAN  Inpatient or Swing Bed: Inpatient  PT Plan  Treatment/Interventions: Bed mobility, Transfer training, Gait training, Stair training, Balance training, Strengthening, Endurance training, Range of motion, Therapeutic exercise, Therapeutic activity  PT Plan: Ongoing PT  PT Frequency: 6 times per week  PT Discharge Recommendations: Moderate intensity level of continued care  PT Recommended Transfer Status: Assist x1  PT - OK to Discharge: Yes    Subjective     General Visit Information:  General  Reason for Referral: Pt. admitted for fall. Pt. has a left incomplete lateral compression pelvic ring injury, R SDH, pelvic hematoma, and R rib fractures.  Past Medical History Relevant to Rehab: HTN, HLD, CAD , STEMI s/p CABG, diastolic heart failure, congenital heart block s/p pacemeaker, PAH, Afib, CKD stage IV, chronc hypoxic resp failure, COPD (on home 5L NC), LEONEL, OA, chronic spine degen, GERD  Co-Treatment: OT  Co-Treatment Reason: To maximize patient safety and mobility  Prior to Session Communication: Bedside nurse  Patient Position Received: Bed, 3 rail up, Alarm off, not on at start of session  General Comment: Pt. pleasant and cooperative with therapy. Pt. had increasing pain with mobility on her L hip. Telemetry, michelle, 11 L O2, 15 L O2 on portable oxygen tank.    Home Living:  Home Living  Type of  Home: House  Lives With: Alone  Home Adaptive Equipment: Cane, Walker rolling or standard (And rollator)  Home Layout: One level  Home Access: Stairs to enter without rails  Entrance Stairs-Rails: None (Pt. stated that there is a post she is able to grab onto)  Entrance Stairs-Number of Steps: 2    Prior Level of Function:  Prior Function Per Pt/Caregiver Report  Level of Todd: Independent with ADLs and functional transfers  Receives Help From: Friends (Her friend is able to help her if she needs it)  Ambulatory Assistance: Independent (Short distance community ambulator. Uses cane most of the time at home and going out. Pt. has been limited due to overall body fatigue and SOB. Has to use scooter when going to [employer] stores.)  Vocational: Retired  Leisure: Selling things on [employer]  Prior Function Comments: (+) Drive. (-) Falls (Pt. admitted to hospital for fall but did not have one prior to admission.) Pt. fell trying to go to the bathroom at night and rushed over there and fell.    Precautions:  Precautions  Medical Precautions: Fall precautions  Precautions Comment: WBAT on LLE. SpO2 >88%    Vital Signs:  Vital Signs  Heart Rate:  (PRE: 70 POST: 72)  Resp:  (PRE: 17 POST: 17)  SpO2:  (PRE: 90 DURIN (lowest) POST: 92)  BP:  (PRE: 100/43 DURIN/44 POST: 91/33 (1st trial) 94/42 (2nd trial after raising HOB and instructed patient to do ankle pumps))    Objective     Pain:  Pain Assessment  Pain Assessment: 0-10  0-10 (Numeric) Pain Score: 10 - Worst possible pain (Pt. reported 0/10 pain at L hip at begining of session and then 10/10 pain when got to standing. (RN notified))    Cognition:  Cognition  Overall Cognitive Status: Within Functional Limits  Arousal/Alertness: Appropriate responses to stimuli  Orientation Level: Oriented X4  Following Commands: Follows all commands and directions without difficulty    General Assessments:  Activity Tolerance  Early Mobility/Exercise Safety Screen: Proceed  with mobilization - No exclusion criteria met    Sensation  Light Touch: No apparent deficits (Tested light touch sensation on B LE.)    Strength  Strength Comments: R knee ext, ankle DF and PF >/= 4+/5. L knee ext 4-/5 (limited due to pain). L Ankle DF and PF 5/5.  Strength  Strength Comments: R knee ext, ankle DF and PF >/= 4+/5. L knee ext 4-/5 (limited due to pain). L Ankle DF and PF 5/5.    Static Sitting Balance  Static Sitting-Balance Support: Bilateral upper extremity supported, Feet supported (Pt. would alternate between L and R UE.)  Static Sitting-Level of Assistance:  (Close supervision-CGA)  Dynamic Sitting Balance  Dynamic Sitting-Balance Support: Bilateral upper extremity supported, Feet supported (Pt. would alternate between L and R UE.)  Dynamic Sitting-Balance: Forward lean  Dynamic Sitting-Comments: CGA-MinAx1. Pt. would lean back occasionally and would require MinAx1 to correct.    Static Standing Balance  Static Standing-Balance Support: No upper extremity supported  Static Standing-Level of Assistance: Contact guard  Static Standing-Comment/Number of Minutes: Pt. had forward flex posture and required  verbal cues to stand straight.  Dynamic Standing Balance  Dynamic Standing-Balance Support: No upper extremity supported  Dynamic Standing-Comments: CGA. Pt. had forward flex posture and required verbal cues to stand straight.    Functional Assessments:  Bed Mobility  Bed Mobility: Yes  Bed Mobility 1  Bed Mobility 1: Supine to sitting  Level of Assistance 1: Minimum assistance (x1)  Bed Mobility Comments 1: Pt. required cues to swing feet off the bed and to scoot up when in sitting.  Bed Mobility 2  Bed Mobility  2: Scooting  Level of Assistance 2: Close supervision  Bed Mobility Comments 2: x1. Seated at EOB lateral scoots.  Bed Mobility 3  Bed Mobility 3: Sitting to supine  Level of Assistance 3: Minimum assistance (x1)  Bed Mobility Comments 3: Pt. required assistance for LE's to be swung up  to bed.    Transfers  Transfer: Yes  Transfer 1  Technique 1: Sit to stand, Stand to sit  Transfer Device 1: Walker  Transfer Level of Assistance 1: Minimum assistance (x1)  Trials/Comments 1: Pt. required verbal cues for hand placement and to stand up straight. Pt. required increased time to complete stand. Pt. was in significant pain during transfer and reported L hip pain 10/10.    Ambulation/Gait Training  Ambulation/Gait Training Performed: Yes  Ambulation/Gait Training 1  Surface 1: Level tile  Device 1: Rolling walker  Assistance 1: Contact guard  Quality of Gait 1: Antalgic  Comments/Distance (ft) 1: Pt. attempted to do side steps to the left by EOB, but could not achieve due to L hip pain.    Extremity/Trunk Assessments:  RLE   RLE : Within Functional Limits  LLE   LLE : Exceptions to WFL (Pt. unable to achieve full range of motion with knee ext. due to pain. (Hip ROM not tested due to pain))    Treatments:  Therapeutic Activity  Therapeutic Activity Performed: Yes  Therapeutic Activity 1: Pt. performed 2 lateral scoots seated on EOB close supervision. Pt required increased time between scoots due to pain and fatigue. Pt. used both UE's to lift her self up and move.  Therapeutic Activity 2: Pt. performed static/dynamic sitting on EOB for 20 mins. CGA-MinAx1. Pt. required MinAx1 due to her leaning back occasionally and needing to be corrected. Patient verbalized pain when performing dynamic sitting activities.    Bed Mobility  Bed Mobility: Yes  Bed Mobility 1  Bed Mobility 1: Supine to sitting  Level of Assistance 1: Minimum assistance (x1)  Bed Mobility Comments 1: Pt. required cues to swing feet off the bed and to scoot up when in sitting.  Bed Mobility 2  Bed Mobility  2: Scooting  Level of Assistance 2: Close supervision  Bed Mobility Comments 2: x1. Seated at EOB lateral scoots.  Bed Mobility 3  Bed Mobility 3: Sitting to supine  Level of Assistance 3: Minimum assistance (x1)  Bed Mobility Comments 3:  Pt. required assistance for LE's to be swung up to bed.    Ambulation/Gait Training  Ambulation/Gait Training Performed: Yes  Ambulation/Gait Training 1  Surface 1: Level tile  Device 1: Rolling walker  Assistance 1: Contact guard  Quality of Gait 1: Antalgic  Comments/Distance (ft) 1: Pt. attempted to do side steps to the left by EOB, but could not achieve due to L hip pain.  Transfers  Transfer: Yes  Transfer 1  Technique 1: Sit to stand, Stand to sit  Transfer Device 1: Walker  Transfer Level of Assistance 1: Minimum assistance (x1)  Trials/Comments 1: Pt. required verbal cues for hand placement and to stand up straight. Pt. required increased time to complete stand. Pt. was in significant pain during transfer and reported L hip pain 10/10.    Outcome Measures:  [facility] Basic Mobility  Turning from your back to your side while in a flat bed without using bedrails: A little  Moving from lying on your back to sitting on the side of a flat bed without using bedrails: A little  Moving to and from bed to chair (including a wheelchair): Total  Standing up from a chair using your arms (e.g. wheelchair or bedside chair): A little  To walk in hospital room: Total  Climbing 3-5 steps with railing: Total  Basic Mobility - Total Score: 12    Confusion Assessment Method-ICU (CAM-ICU)  Feature 1: Acute Onset or Fluctuating Course: Negative  Overall CAM-ICU: Negative    FSS-ICU  Ambulation: Unable to attempt due to weakness  Rolling: Supervision or set-up only  Sitting: Minimal assistance (performs 75% or more of task)  Transfer Sit-to-Stand: Minimal assistance (performs 75% or more of task)  Transfer Supine-to-Sit: Minimal assistance (performs 75% or more of task)  Total Score: 17    Early Mobility/Exercise Safety Screen: Proceed with mobilization - No exclusion criteria met  ICU Mobility Scale: Standing [4]  E = Exercise and Early Mobility  Early Mobility/Exercise Safety Screen: Proceed with mobilization - No exclusion criteria  met  ICU Mobility Scale: Standing    Encounter Problems       Encounter Problems (Active)       Balance       Pt. will score >18 on Tinetti for lower risk of falls       Start:  07/05/24    Expected End:  07/19/24               Mobility       Patient will ambulate >25ft. with LRAD CGA.        Start:  07/05/24    Expected End:  07/19/24            Patient will ascend and descend 2 stairs with railing with LRAD MinAx1.       Start:  07/05/24    Expected End:  07/19/24               PT Transfers       Patient will perform bed mobility Indep.       Start:  07/05/24    Expected End:  07/19/24            Patient will transfer sit to and from stand with LRAD CGA.        Start:  07/05/24    Expected End:  07/19/24               Pain - Adult            Education Documentation  Precautions, taught by ANABEL Comer at 7/5/2024  3:35 PM.  Learner: Patient  Readiness: Acceptance  Method: Explanation  Response: Verbalizes Understanding  Comment: WBAT on LLE    Body Mechanics, taught by ANABEL Comer at 7/5/2024  3:33 PM.  Learner: Patient  Readiness: Acceptance  Method: Explanation  Response: Needs Reinforcement  Comment: Pt. needs verbal/tactile cues for standing and transfers    Mobility Training, taught by ANABEL Comer at 7/5/2024  3:33 PM.  Learner: Patient  Readiness: Acceptance  Method: Explanation  Response: Needs Reinforcement  Comment: Pt. needs verbal/tactile cues for standing and transfers    Education Comments  No comments found.

## 2024-07-06 ENCOUNTER — APPOINTMENT (OUTPATIENT)
Dept: RADIOLOGY | Facility: HOSPITAL | Age: 74
DRG: 963 | End: 2024-07-06
Payer: MEDICARE

## 2024-07-06 ENCOUNTER — APPOINTMENT (OUTPATIENT)
Dept: CARDIOLOGY | Facility: HOSPITAL | Age: 74
DRG: 963 | End: 2024-07-06
Payer: MEDICARE

## 2024-07-06 LAB
ALBUMIN SERPL BCP-MCNC: 2.8 G/DL (ref 3.4–5)
ALBUMIN SERPL BCP-MCNC: 2.9 G/DL (ref 3.4–5)
ALBUMIN SERPL BCP-MCNC: 3 G/DL (ref 3.4–5)
AMPHETAMINES UR QL SCN: ABNORMAL
ANION GAP SERPL CALC-SCNC: 16 MMOL/L (ref 10–20)
ANION GAP SERPL CALC-SCNC: 18 MMOL/L (ref 10–20)
ANION GAP SERPL CALC-SCNC: 18 MMOL/L (ref 10–20)
BARBITURATES UR QL SCN: ABNORMAL
BASOPHILS # BLD AUTO: 0.03 X10*3/UL (ref 0–0.1)
BASOPHILS NFR BLD AUTO: 0.2 %
BENZODIAZ UR QL SCN: ABNORMAL
BUN SERPL-MCNC: 37 MG/DL (ref 6–23)
BUN SERPL-MCNC: 39 MG/DL (ref 6–23)
BUN SERPL-MCNC: 40 MG/DL (ref 6–23)
BURR CELLS BLD QL SMEAR: NORMAL
BZE UR QL SCN: ABNORMAL
CALCIUM SERPL-MCNC: 8.1 MG/DL (ref 8.6–10.6)
CALCIUM SERPL-MCNC: 8.1 MG/DL (ref 8.6–10.6)
CALCIUM SERPL-MCNC: 8.2 MG/DL (ref 8.6–10.6)
CANNABINOIDS UR QL SCN: ABNORMAL
CHLORIDE SERPL-SCNC: 97 MMOL/L (ref 98–107)
CHLORIDE SERPL-SCNC: 98 MMOL/L (ref 98–107)
CHLORIDE SERPL-SCNC: 98 MMOL/L (ref 98–107)
CHLORIDE UR-SCNC: 70 MMOL/L
CHLORIDE/CREATININE (MMOL/G) IN URINE: 131 MMOL/G CREAT (ref 38–318)
CK SERPL-CCNC: 17 U/L (ref 0–215)
CO2 SERPL-SCNC: 19 MMOL/L (ref 21–32)
CO2 SERPL-SCNC: 20 MMOL/L (ref 21–32)
CO2 SERPL-SCNC: 20 MMOL/L (ref 21–32)
CREAT SERPL-MCNC: 2.9 MG/DL (ref 0.5–1.05)
CREAT SERPL-MCNC: 3.15 MG/DL (ref 0.5–1.05)
CREAT SERPL-MCNC: 3.27 MG/DL (ref 0.5–1.05)
CREAT UR-MCNC: 53.3 MG/DL (ref 20–320)
EGFRCR SERPLBLD CKD-EPI 2021: 14 ML/MIN/1.73M*2
EGFRCR SERPLBLD CKD-EPI 2021: 15 ML/MIN/1.73M*2
EGFRCR SERPLBLD CKD-EPI 2021: 17 ML/MIN/1.73M*2
EOSINOPHIL # BLD AUTO: 0 X10*3/UL (ref 0–0.4)
EOSINOPHIL NFR BLD AUTO: 0 %
ERYTHROCYTE [DISTWIDTH] IN BLOOD BY AUTOMATED COUNT: 21.6 % (ref 11.5–14.5)
FENTANYL+NORFENTANYL UR QL SCN: ABNORMAL
GLUCOSE BLD MANUAL STRIP-MCNC: 113 MG/DL (ref 74–99)
GLUCOSE BLD MANUAL STRIP-MCNC: 121 MG/DL (ref 74–99)
GLUCOSE BLD MANUAL STRIP-MCNC: 122 MG/DL (ref 74–99)
GLUCOSE BLD MANUAL STRIP-MCNC: 152 MG/DL (ref 74–99)
GLUCOSE SERPL-MCNC: 103 MG/DL (ref 74–99)
GLUCOSE SERPL-MCNC: 111 MG/DL (ref 74–99)
GLUCOSE SERPL-MCNC: 151 MG/DL (ref 74–99)
HCT VFR BLD AUTO: 30.7 % (ref 36–46)
HGB BLD-MCNC: 9.2 G/DL (ref 12–16)
HOLD SPECIMEN: NORMAL
IMM GRANULOCYTES # BLD AUTO: 0.19 X10*3/UL (ref 0–0.5)
IMM GRANULOCYTES NFR BLD AUTO: 1.3 % (ref 0–0.9)
LYMPHOCYTES # BLD AUTO: 0.48 X10*3/UL (ref 0.8–3)
LYMPHOCYTES NFR BLD AUTO: 3.3 %
MAGNESIUM SERPL-MCNC: 2.18 MG/DL (ref 1.6–2.4)
MCH RBC QN AUTO: 23.4 PG (ref 26–34)
MCHC RBC AUTO-ENTMCNC: 30 G/DL (ref 32–36)
MCV RBC AUTO: 78 FL (ref 80–100)
METHADONE UR QL SCN: ABNORMAL
MONOCYTES # BLD AUTO: 0.39 X10*3/UL (ref 0.05–0.8)
MONOCYTES NFR BLD AUTO: 2.7 %
NEUTROPHILS # BLD AUTO: 13.33 X10*3/UL (ref 1.6–5.5)
NEUTROPHILS NFR BLD AUTO: 92.5 %
NRBC BLD-RTO: 0 /100 WBCS (ref 0–0)
OPIATES UR QL SCN: ABNORMAL
OSMOLALITY SERPL: 284 MOSM/KG (ref 280–300)
OXYCODONE+OXYMORPHONE UR QL SCN: ABNORMAL
PCP UR QL SCN: ABNORMAL
PHOSPHATE SERPL-MCNC: 5.3 MG/DL (ref 2.5–4.9)
PHOSPHATE SERPL-MCNC: 6.3 MG/DL (ref 2.5–4.9)
PHOSPHATE SERPL-MCNC: 6.8 MG/DL (ref 2.5–4.9)
PLATELET # BLD AUTO: 140 X10*3/UL (ref 150–450)
POTASSIUM SERPL-SCNC: 4.8 MMOL/L (ref 3.5–5.3)
POTASSIUM SERPL-SCNC: 5 MMOL/L (ref 3.5–5.3)
POTASSIUM SERPL-SCNC: 5.8 MMOL/L (ref 3.5–5.3)
POTASSIUM UR-SCNC: 54 MMOL/L
POTASSIUM/CREAT UR-RTO: 101 MMOL/G CREAT
RBC # BLD AUTO: 3.93 X10*6/UL (ref 4–5.2)
RBC MORPH BLD: NORMAL
SODIUM SERPL-SCNC: 128 MMOL/L (ref 136–145)
SODIUM SERPL-SCNC: 129 MMOL/L (ref 136–145)
SODIUM SERPL-SCNC: 131 MMOL/L (ref 136–145)
SODIUM UR-SCNC: 26 MMOL/L
SODIUM/CREAT UR-RTO: 49 MMOL/G CREAT
WBC # BLD AUTO: 14.4 X10*3/UL (ref 4.4–11.3)

## 2024-07-06 PROCEDURE — 93306 TTE W/DOPPLER COMPLETE: CPT

## 2024-07-06 PROCEDURE — 82947 ASSAY GLUCOSE BLOOD QUANT: CPT

## 2024-07-06 PROCEDURE — 70450 CT HEAD/BRAIN W/O DYE: CPT

## 2024-07-06 PROCEDURE — 82550 ASSAY OF CK (CPK): CPT

## 2024-07-06 PROCEDURE — 2500000005 HC RX 250 GENERAL PHARMACY W/O HCPCS

## 2024-07-06 PROCEDURE — 2500000004 HC RX 250 GENERAL PHARMACY W/ HCPCS (ALT 636 FOR OP/ED)

## 2024-07-06 PROCEDURE — 82436 ASSAY OF URINE CHLORIDE: CPT

## 2024-07-06 PROCEDURE — 2500000002 HC RX 250 W HCPCS SELF ADMINISTERED DRUGS (ALT 637 FOR MEDICARE OP, ALT 636 FOR OP/ED)

## 2024-07-06 PROCEDURE — 2500000001 HC RX 250 WO HCPCS SELF ADMINISTERED DRUGS (ALT 637 FOR MEDICARE OP)

## 2024-07-06 PROCEDURE — 80307 DRUG TEST PRSMV CHEM ANLYZR: CPT

## 2024-07-06 PROCEDURE — 80069 RENAL FUNCTION PANEL: CPT

## 2024-07-06 PROCEDURE — 2500000002 HC RX 250 W HCPCS SELF ADMINISTERED DRUGS (ALT 637 FOR MEDICARE OP, ALT 636 FOR OP/ED): Performed by: INTERNAL MEDICINE

## 2024-07-06 PROCEDURE — 99291 CRITICAL CARE FIRST HOUR: CPT

## 2024-07-06 PROCEDURE — 76770 US EXAM ABDO BACK WALL COMP: CPT | Performed by: RADIOLOGY

## 2024-07-06 PROCEDURE — 70450 CT HEAD/BRAIN W/O DYE: CPT | Performed by: STUDENT IN AN ORGANIZED HEALTH CARE EDUCATION/TRAINING PROGRAM

## 2024-07-06 PROCEDURE — 2020000001 HC ICU ROOM DAILY

## 2024-07-06 PROCEDURE — 37799 UNLISTED PX VASCULAR SURGERY: CPT

## 2024-07-06 PROCEDURE — 99222 1ST HOSP IP/OBS MODERATE 55: CPT

## 2024-07-06 PROCEDURE — 36415 COLL VENOUS BLD VENIPUNCTURE: CPT

## 2024-07-06 PROCEDURE — 99291 CRITICAL CARE FIRST HOUR: CPT | Performed by: INTERNAL MEDICINE

## 2024-07-06 PROCEDURE — 83735 ASSAY OF MAGNESIUM: CPT

## 2024-07-06 PROCEDURE — 93306 TTE W/DOPPLER COMPLETE: CPT | Performed by: STUDENT IN AN ORGANIZED HEALTH CARE EDUCATION/TRAINING PROGRAM

## 2024-07-06 PROCEDURE — 76770 US EXAM ABDO BACK WALL COMP: CPT

## 2024-07-06 PROCEDURE — 85025 COMPLETE CBC W/AUTO DIFF WBC: CPT

## 2024-07-06 PROCEDURE — 83930 ASSAY OF BLOOD OSMOLALITY: CPT

## 2024-07-06 RX ORDER — CEFTRIAXONE 1 G/50ML
1 INJECTION, SOLUTION INTRAVENOUS EVERY 24 HOURS
Status: COMPLETED | OUTPATIENT
Start: 2024-07-06 | End: 2024-07-12

## 2024-07-06 RX ORDER — FUROSEMIDE 10 MG/ML
80 INJECTION INTRAMUSCULAR; INTRAVENOUS ONCE
Status: DISCONTINUED | OUTPATIENT
Start: 2024-07-06 | End: 2024-07-06

## 2024-07-06 RX ORDER — HEPARIN SODIUM 5000 [USP'U]/ML
5000 INJECTION, SOLUTION INTRAVENOUS; SUBCUTANEOUS EVERY 8 HOURS
Status: DISCONTINUED | OUTPATIENT
Start: 2024-07-06 | End: 2024-07-06 | Stop reason: ALTCHOICE

## 2024-07-06 RX ORDER — NOREPINEPHRINE BITARTRATE/D5W 8 MG/250ML
0-.2 PLASTIC BAG, INJECTION (ML) INTRAVENOUS CONTINUOUS
Status: DISCONTINUED | OUTPATIENT
Start: 2024-07-06 | End: 2024-07-07

## 2024-07-06 RX ORDER — FUROSEMIDE 10 MG/ML
120 INJECTION INTRAMUSCULAR; INTRAVENOUS ONCE
Status: COMPLETED | OUTPATIENT
Start: 2024-07-06 | End: 2024-07-06

## 2024-07-06 RX ADMIN — ACETAMINOPHEN 975 MG: 325 TABLET ORAL at 08:20

## 2024-07-06 RX ADMIN — FUROSEMIDE 120 MG: 10 INJECTION, SOLUTION INTRAMUSCULAR; INTRAVENOUS at 03:25

## 2024-07-06 RX ADMIN — SILDENAFIL 20 MG: 20 TABLET ORAL at 20:43

## 2024-07-06 RX ADMIN — SODIUM ZIRCONIUM CYCLOSILICATE 10 G: 10 POWDER, FOR SUSPENSION ORAL at 20:43

## 2024-07-06 RX ADMIN — PREGABALIN 75 MG: 25 CAPSULE ORAL at 08:20

## 2024-07-06 RX ADMIN — SILDENAFIL 20 MG: 20 TABLET ORAL at 15:00

## 2024-07-06 RX ADMIN — PREDNISONE 10 MG: 20 TABLET ORAL at 08:20

## 2024-07-06 RX ADMIN — ESCITALOPRAM 10 MG: 10 TABLET, FILM COATED ORAL at 08:20

## 2024-07-06 RX ADMIN — PREGABALIN 75 MG: 25 CAPSULE ORAL at 20:43

## 2024-07-06 RX ADMIN — SILDENAFIL 20 MG: 20 TABLET ORAL at 08:20

## 2024-07-06 RX ADMIN — NOREPINEPHRINE BITARTRATE 0.03 MCG/KG/MIN: 8 INJECTION, SOLUTION INTRAVENOUS at 23:21

## 2024-07-06 RX ADMIN — LEVETIRACETAM 500 MG: 500 TABLET, FILM COATED ORAL at 08:20

## 2024-07-06 RX ADMIN — CEFTRIAXONE SODIUM 1 G: 1 INJECTION, SOLUTION INTRAVENOUS at 01:37

## 2024-07-06 RX ADMIN — AZELASTINE HYDROCHLORIDE 1 SPRAY: 137 SPRAY, METERED NASAL at 08:20

## 2024-07-06 RX ADMIN — ACETAMINOPHEN 975 MG: 325 TABLET ORAL at 20:43

## 2024-07-06 RX ADMIN — AZELASTINE HYDROCHLORIDE 1 SPRAY: 137 SPRAY, METERED NASAL at 20:43

## 2024-07-06 RX ADMIN — ACETAMINOPHEN 975 MG: 325 TABLET ORAL at 15:11

## 2024-07-06 RX ADMIN — LEVETIRACETAM 500 MG: 500 TABLET, FILM COATED ORAL at 20:43

## 2024-07-06 RX ADMIN — SODIUM ZIRCONIUM CYCLOSILICATE 10 G: 10 POWDER, FOR SUSPENSION ORAL at 08:37

## 2024-07-06 RX ADMIN — LAMOTRIGINE 100 MG: 100 TABLET ORAL at 08:20

## 2024-07-06 RX ADMIN — PANTOPRAZOLE SODIUM 40 MG: 40 TABLET, DELAYED RELEASE ORAL at 08:20

## 2024-07-06 RX ADMIN — PERFLUTREN 2 ML OF DILUTION: 6.52 INJECTION, SUSPENSION INTRAVENOUS at 15:07

## 2024-07-06 ASSESSMENT — PAIN - FUNCTIONAL ASSESSMENT
PAIN_FUNCTIONAL_ASSESSMENT: 0-10

## 2024-07-06 ASSESSMENT — PAIN SCALES - GENERAL
PAINLEVEL_OUTOF10: 0 - NO PAIN
PAINLEVEL_OUTOF10: 3
PAINLEVEL_OUTOF10: 0 - NO PAIN

## 2024-07-06 ASSESSMENT — VISUAL ACUITY: OU: 1

## 2024-07-06 ASSESSMENT — PAIN DESCRIPTION - LOCATION: LOCATION: LEG

## 2024-07-06 NOTE — H&P
Medical Intensive Care - History and Physical   Subjective    Bhargavi Isidro is a 74 y.o. year old female patient admitted on 7/4/2024 with following ICU needs: high oxygen requirement    HPI:  Patient is a 74-year-old female with history of A-fib, CAD s/p CABG, COPD, group I/II/III pulmonary hypertension who originally presented to [facility] [place] 7/4 after a presumed fall where was found down at home around 3 am. Patient states this was a mechanical fall and was not preceded by dizziness/lightheadedness, syncope, chest pain. Patient admitted to the Trauma service where she was found to have a right sided SDH as well as a L inferior pubic rami fracture c/b hematoma formation. She did receive 1u PRBC initially but was otherwise stable. No operative interventions following consultation by IR, Ortho, Neurosurgery.    Patient with history of pulmonary hypertension refused to get her remodulin infusions through an IV and is adamant about administering it herself through her port with her own cassettes. Patient had been on 10L NC which is about her home baseline (5-6L at rest, 10-15L exertion) receiving fluids continuously with intermittent boluses for hypotension. She began having intermittent desaturations down to 80%. She was placed on 15LNC and transferred to the MICU for further management of hypoxic respiratory failure.    Upon arrival to the MICU, patient not complaining of shortness of breath and states that she has never felt dyspnea even while desaturating. She denies fevers, chills, cough, chest pain, abdominal pain, nausea, vomiting, diarrhea. Last bowel movement two days ago. Otherwise appears comfortable on 15LNC.    Of note, patient diagnosed with pulmonary hypertension in 2017 via RHC. Multiple RHC's have shown different levels of contribution from a pre-capillary components and she was initially placed on pulmonary vasodilators before the focus of treatment switched to underlying heart disease and LEONEL with  keeping sildenafil on in 2019. In 2021, RHC again demonstrating predominantly precapillary pHTN and she was started on remodulin and kept on sildenafil. Most recent RHC in 2022 showing progression of both pre and post capillary pHTN.    Review of Systems:  Review of Systems    As noted above in the HPI    Meds    Home medications:  Current Outpatient Medications   Medication Instructions    allopurinol (ZYLOPRIM) 300 mg, oral, Daily    ALPRAZolam (XANAX) 0.5 mg, oral, Daily PRN    apixaban (ELIQUIS) 5 mg, oral, 2 times daily    azelastine (Astelin) 137 mcg (0.1 %) nasal spray 1 spray, Each Nostril, 2 times daily, Use in each nostril as directed    busPIRone (BUSPAR) 5 mg, oral, 2 times daily    escitalopram (LEXAPRO) 10 mg, oral, Daily    lamoTRIgine (LAMICTAL) 100 mg, oral, Daily    loperamide (IMODIUM) 4 mg, oral, 3 times daily    magnesium oxide (MAG-OX) 800 mg, oral, 2 times daily    methocarbamol (ROBAXIN) 500 mg, oral, 2 times daily PRN    pantoprazole (PROTONIX) 40 mg, oral, 2 times daily, Do not crush, chew, or split.    potassium chloride CR 10 mEq ER tablet 40 mEq, oral, 3 times daily, Do not crush, chew, or split.    predniSONE (DELTASONE) 10 mg, oral, Daily    pregabalin (LYRICA) 75 mg, oral, 2 times daily    sildenafil (REVATIO) 20 mg, oral, 3 times daily    torsemide (DEMADEX) 40 mg, oral, 2 times daily        Inpatient medications:  Scheduled medications  acetaminophen, 650 mg, oral, q6h  azelastine, 1 spray, Each Nostril, BID  escitalopram, 10 mg, oral, Daily  lamoTRIgine, 100 mg, oral, Daily  levETIRAcetam, 500 mg, oral, BID  pantoprazole, 40 mg, oral, Daily before breakfast  PATIENT OWN PUMP diluent for treprostinil 42 mL PATIENT SUPPLIED, , pump - intravenous, Continuous Pump  predniSONE, 10 mg, oral, Daily  pregabalin, 75 mg, oral, BID  sildenafil, 20 mg, oral, TID  torsemide, 40 mg, oral, BID      Continuous medications  [Held by provider] treprostinil, 39 ng/kg/min (Order-Specific)      PRN  "medications  PRN medications: HYDROmorphone, methocarbamol, ondansetron **OR** ondansetron, oxyCODONE, oxyCODONE, oxygen     Objective    Blood pressure 115/50, pulse 70, temperature 36 °C (96.8 °F), temperature source Temporal, resp. rate 12, height 1.651 m (5' 5\"), weight 63 kg (139 lb), SpO2 (!) 89%.     Physical Exam  Constitutional:       Appearance: Normal appearance.   HENT:      Head: Normocephalic.      Comments: Extensive bruising over left side of the face     Mouth/Throat:      Mouth: Mucous membranes are moist.      Pharynx: No posterior oropharyngeal erythema.   Eyes:      Extraocular Movements: Extraocular movements intact.      Pupils: Pupils are equal, round, and reactive to light.   Cardiovascular:      Rate and Rhythm: Normal rate and regular rhythm.      Pulses: Normal pulses.      Heart sounds: Normal heart sounds.   Pulmonary:      Effort: Pulmonary effort is normal.      Breath sounds: Normal breath sounds.   Abdominal:      General: Abdomen is flat. There is no distension.      Palpations: Abdomen is soft.      Tenderness: There is no abdominal tenderness.   Musculoskeletal:      Right lower leg: No edema.      Left lower leg: No edema.   Skin:     General: Skin is warm and dry.      Comments: Extensive bruising scattered across upper and lower extremities and shoulders   Neurological:      General: No focal deficit present.      Mental Status: She is alert and oriented to person, place, and time.            Intake/Output Summary (Last 24 hours) at 7/5/2024 2028  Last data filed at 7/5/2024 1800  Gross per 24 hour   Intake 538 ml   Output 205 ml   Net 333 ml     Labs:   Results from last 72 hours   Lab Units 07/05/24  1403 07/05/24  0209 07/04/24  0958   SODIUM mmol/L 130* 132* 136   POTASSIUM mmol/L 5.3 5.6* 4.9   CHLORIDE mmol/L 100 103 104   CO2 mmol/L 19* 22 20*   BUN mg/dL 33* 32* 30*   CREATININE mg/dL 2.51* 1.98* 1.59*   GLUCOSE mg/dL 132* 94 91   CALCIUM mg/dL 7.9* 8.3* 8.6   ANION " "GAP mmol/L 16 13 17   EGFR mL/min/1.73m*2 20* 26* 34*   PHOSPHORUS mg/dL 4.6 3.8  --       Results from last 72 hours   Lab Units 07/05/24  0309 07/04/24  2326 07/04/24  1818 07/04/24  0958   WBC AUTO x10*3/uL 17.4* 15.3* 14.6* 22.0*   HEMOGLOBIN g/dL 9.4* 9.2* 9.1* 10.3*   HEMATOCRIT % 29.7* 30.0* 30.0* 32.8*   PLATELETS AUTO x10*3/uL 151 179 168 229   NEUTROS PCT AUTO %  --   --   --  86.0   LYMPHS PCT AUTO %  --   --   --  6.1   MONOS PCT AUTO %  --   --   --  5.9   EOS PCT AUTO %  --   --   --  0.5        Micro/ID:     No results found for: \"URINECULTURE\", \"BLOODCULT\", \"CSFCULTSMEAR\"    Summary of Key Imaging Results  CT Head w/o Contrast (7/5/2024):  IMPRESSION:  Acute subdural hematoma overlying the right cerebral convexity measuring up to 1.1 cm in thickness at the right parietal lobe associated with trace sulcal subarachnoid hemorrhage immediately adjacent to the subdural. There are no prior studies available for comparison in the  system.    XR Pelvis (7/4/2024)  IMPRESSION:  Mildly displaced left superior and left inferior pubic rami fractures    Chest X Ray (7/5/2024)  IMPRESSION:  1.  Interval improvement in pulmonary edema.  2. Cardiomegaly versus pericardial effusion.    Right Heart Catheterization 10/2022 significant for RAP 20 PAP 92/45 mPAP 61 PCWP 26 CO/CI (TD) 3.1/1.74 PVR 11.9 PaSat 57 Additional Maneuvers included None   Right Heart Catheterization on 7/2021 on 7ng/kg/min remodulin 20mg TID sildenafil: RAP 22 RVSP 72 PAP 72/40 mPAP 51 PCWP 24 CO/CI 3.9/2.1 (TD) PaSat 45%  PVR 6.9  Right Heart Catheterization 4/22/2021 (on 10ng/kg/min remodulin, 10mg TID Sildenafil): RAP 14 RVSP 67 PAP 67/36 mPAP 46 PCWP 21 CO/CI (TD) 3.9/2.13 PVR 6.41 COHEN SvO2 56%   Right Heart Catheterization on 4/14/2021 (on no medications, sildenafil held 4 days prior): RAP 11 RVSP 66 PAP 66/37 mPAP 47 PCWP 17 CO/CI (TD) 2.7/1.46 PVR 11.11 COHEN SvO2 52%   Right Heart Catheterization  on 10/17/19 (on 10mg sildenafil Q8) " significant for RAP 15mmhg, RVSP 73mmhg, PAP 73/29, mPAP 39, PCWP 24mmhg, DPmmHg, TPG 15mmhg, CO/CI: 4.0/2.22, PVR 3.75 COHEN, PaSat 63%. Weight on day of RHC 73kg (160 pounds)    Right heart catheterization 2017(outside) significant for RAP 3, PAP 74/27 mPAP 46, PWCP 8, CO/CI 5.33/2.93 PA sat: 81%, PVR: 7.12 COHEN     Last Echo:  [facility] - 2024 2:42 PM EDT   CONCLUSIONS:  - Exam indication: Pulmonary Artery HTN  - The left ventricle is small. There is concentric left ventricular hypertrophy.  Left ventricular systolic function is normal. EF = 55 ± 5% (2D biplane) Grade II left ventricular diastolic dysfunction.  - The right ventricle is dilated. Right ventricular systolic function is low  normal.  - The left atrial cavity is mildly dilated.  - The right atrial cavity is dilated.  - There is severe (4+) tricuspid valve regurgitation.  - Estimated right ventricular systolic pressure is 76 mmHg consistent with moderately severe pulmonary hypertension (but may be underestimated due to severe  TR). Estimated right atrial pressure is 15 mmHg based on IVC assessment.  - Exam was compared with the prior CC echocardiographic exam performed on  2023. Similar findings.    Assessment and Plan     Assessment:  Bhargavi Isidro is a 74 y.o. year old female patient originally admitted to [facility] [place] for mechanical fall associated with acute right SDH and pelvic fracture c/b hematoma. Stable not needing operative management. admitted to the MICU on 24 for acute on chronic hypoxic respiratory failure likely related to overadministration of fluids in the setting of underlying pulmonary hypertension and right sided heart failure.    Mechanical Ventilation: none  Sedation/Analgesia:  none  Restraints: no    Plan:  NEUROLOGY/PSYCH:    Right sided subdural hematoma  ::SDH seen on CT scan following mechanical fall in the setting of Eliquis use for A-fib  ::Neurosurgery consulted, no operative management  ::S/p  reversal with Andexxa  Management:  -Neurosurgery following, appreciate recommendations  -Repeat CT Head w/o contrast tonight at 4 am  -Continue regular Neuro checks  -Per Neurosurgery, ok to restart DVT ppx tomorrow    #Major depression  #Generalized anxiety disorder  Management:  -Holding Buspar in setting of renal failure  -C/w Lexapro 10mg  -C/w lamotrigine 100mg daily  -C/w Xanax 0.5mg daily PRN for anxiety    CARDIOVASCULAR:  #Heart failure with preserved ejection fraction  #Right sided heart failure  ::TTE (6/3/2024): LVEF of 55-60%, dilated RV with low normal RV systolic function and severe tricuspid regurgiation  ::RV failure likely secondary to underlying pulmonary hypertension  ::Patient has received continuous drips of LR and D10W and has received intermittent boluses since being admitted  ::Home torsemide restarted but urine output has still been low  Management:  -Holding fluids  -Lasix 80mg IV given, will continue spot dosing as needed    #Atrial fibrillation s/p ablation and PPM  ::CHADS-VASC of 4  ::S/p AV node ablation and PPM insertion  ::On Eliquis previously  Management:  -Eliquis held in setting of intracranial bleed. S/p Andexxa. Per Neurosurgery can restart on day 14 post-fall  -Maintained on telemetry, not on rate-control currently    #CAD s/p CABG  ::CABG done about 10 years ago  ::No heart cath data in her chart  Management:  -Not on statin at home  -Per Neurosurgery must wait until post-fall day 7 before starting ASA    PULMONARY:  #Pulmonary hypertension  #Right sided heart failure  ::Has been thought to have group I, II, and III pulmonary hypertension at various times  ::Most recent RHC (10/2022) significant for RAP 20 PAP 92/45 mPAP 61 PCWP 26 CO/CI (TD) 3.1/1.74 PVR 11.9 PaSat 57. Both pre and post-capillary hypertension  ::RV dilation in TTE as above  ::Patient prescribed 5-6LNC at rest, 10-15LNC on exertion but patient apparently only uses 6LNC and tolerates saturations in the  70s  ::Patient has been on remodulin for years, refuses to use anything besides her own dilutions in her own cassette  Management:  -Holding fluids, diuresis as above  -C/w sildenafil 20mg TID  -Patient will only use her own dilutions of treprostinil through her port. She adamantly refuses to have treprostinil run through her IV despite the risks  -C/w prednisone 10mg daily. This is a chronic medication for previously suspected organizing pneumonia    RENAL/GENITOURINARY:  #Acute on chronic kidney injury, stage I, oliguric  ::Baseline creatinine of around 1.4-1.5  ::FENa 0.4% indicating prerenal DEBORA  ::Renal injury possibly secondary to cardiorenal syndrome as it has worsened despite fluid administration  ::Nephrotoxic medications include Protonix  Management:  -Diuresis as above  -Renal U/S ordered  -Continue to monitor daily RFPs  -Abbott in place, strict I/O  -Avoid further nephrotoxic medications  -Nephrology consult in the AM    #Non-anion gap metabolic acidosis  #Hyperkalemia  ::Likely secondary to acute renal failure which may be secondary to cardiorenal syndrome as above  ::Potassium of 5.8 on repeat labs  Management:  -Diuresis as above  -EKG with no peaked T waves or interval prolongation from baseline  -Lasix as well as Lokelma 10g TID    #Hyponatremia  ::Likely hypervolemic in etiology  Management:  -Diuresis as above  -Continue daily RFPs    GASTROENTEROLOGY:  No acute concerns    INFECTIOUS DISEASE:  #Urinary tract infection  ::No symptoms of dysuria but symptoms of reduced concentration and fatigue  ::UA showing 11-20 WBC, 25 LE  -Ceftriaxone 1g daily (7/6-*)    HEMATOLOGY:  No acute concerns    MUSCULOSKELETAL/ SKIN:  #Left inferior pelvic ramus fracture  #Pelvic hematoma  ::Occurred after fall  ::No operative management per Ortho, no need for IR embolization  Management:  -PT/OT consult  -Per Ortho, if she fails trial of ambulation she may be a candidate for operative intervention for pelvic pain  relief  -Pain regimen: Tylenol 975mg TID, PRN Dilaudid 0.4mg q3h PRN, Lyrica 75mg BID, Robadin 500mg BID PRN      ICU Check List     FEN  Fluids: Hold  Electrolytes: Lowering K  Nutrition: Regular  Prophylaxis:  DVT ppx: Held  GI ppx: Protonix  Bowel care: Miralax PRN, Lokelma  Hardware:  Catheter: Saunders  Access: PIV  Drains: Abbott  Lines: None  Social:  Code: DO NOT INTUBATE  HPOA: Jaleesa Rondon (friend) [phone]   Disposition: ICU while weaning down on oxygen    Gulshan Chu MD   07/05/24 at 8:28 PM     Disclaimer: Documentation completed with the information available at the time of input. The times in the chart may not be reflective of actual patient care times, interventions, or procedures. Documentation occurs after the physical care of the patient.

## 2024-07-06 NOTE — PROGRESS NOTES
Medical Intensive Care - Daily Progress Note   Subjective    Bhargavi Isidro is a 74 y.o. year old female patient with a history of A-fib, CAD s/p CABG, COPD, group I/II/III pulmonary hypertension who originally presented to [facility] on 7/4 after a presumed fall where was found down at home. Admitted on 7/4/2024 with following ICU needs: high O2 requirement     Interval History:  Patient presented from the ED subsequent to a mechanical fall that she denies was preceded by dizziness or a feeling of lightheadedness. Secondary to this fall she developed a SDH and a fracture of her Left inferior pubic rami.     Overnight she was started on 15L of o2 and subsequently weaned down to 8 L of O2.     Of note patient refused to get her remodulin infusions via an IV and was insistent upon self administration.  She did require significant nursing staff assistance and only had the one dose of remodulin on her person     On exam today she was oxygenating well on 8 L of O2, she was mildly obtunded but fully awake and aware. She was notified that she was currently out of her home remodulin and that she would subsequently be started on IV remodulin in the hospital.      Meds    Scheduled medications  acetaminophen, 975 mg, oral, TID  azelastine, 1 spray, Each Nostril, BID  cefTRIAXone, 1 g, intravenous, q24h  escitalopram, 10 mg, oral, Daily  lamoTRIgine, 100 mg, oral, Daily  levETIRAcetam, 500 mg, oral, BID  pantoprazole, 40 mg, oral, Daily before breakfast  PATIENT OWN PUMP diluent for treprostinil 42 mL PATIENT SUPPLIED, , pump - intravenous, Continuous Pump  perflutren protein A microsphere, 0.5 mL, intravenous, Once in imaging  predniSONE, 10 mg, oral, Daily  pregabalin, 75 mg, oral, BID  sildenafil, 20 mg, oral, TID  sodium zirconium cyclosilicate, 10 g, oral, TID  sulfur hexafluoride microsphr, 2 mL, intravenous, Once in imaging      Continuous medications  [Held by provider] treprostinil, 39 ng/kg/min (Order-Specific)      PRN  "medications  PRN medications: HYDROmorphone, methocarbamol, ondansetron **OR** ondansetron, [Held by provider] oxyCODONE, [Held by provider] oxyCODONE, oxygen, polyethylene glycol     Objective    Blood pressure (!) 100/39, pulse 70, temperature 36.4 °C (97.5 °F), temperature source Temporal, resp. rate 16, height 1.651 m (5' 5\"), weight 71.5 kg (157 lb 10.1 oz), SpO2 100%.     Physical Exam  Vitals and nursing note reviewed.   Constitutional:       General: She is sleeping. She is not in acute distress.     Appearance: She is overweight.   HENT:      Head: Normocephalic and atraumatic.   Eyes:      General: Lids are normal. Vision grossly intact.      Extraocular Movements: Extraocular movements intact.   Cardiovascular:      Rate and Rhythm: Normal rate and regular rhythm.   Pulmonary:      Effort: Pulmonary effort is normal.      Breath sounds: Normal breath sounds.   Abdominal:      General: Abdomen is protuberant. Bowel sounds are decreased. There is no distension. There are no signs of injury.      Palpations: Abdomen is soft.      Tenderness: There is no abdominal tenderness.   Skin:     General: Skin is warm and dry.   Neurological:      Mental Status: She is oriented to person, place, and time. She is lethargic.      Motor: Motor function is intact.            Intake/Output Summary (Last 24 hours) at 7/6/2024 1609  Last data filed at 7/6/2024 1300  Gross per 24 hour   Intake 50 ml   Output 644 ml   Net -594 ml     Labs:   Results from last 72 hours   Lab Units 07/06/24  0324 07/05/24  2100 07/05/24  1403   SODIUM mmol/L 128* 129* 130*   POTASSIUM mmol/L 5.8* 5.8* 5.3   CHLORIDE mmol/L 98 97* 100   CO2 mmol/L 20* 20* 19*   BUN mg/dL 37* 35* 33*   CREATININE mg/dL 2.90* 2.82* 2.51*   GLUCOSE mg/dL 111* 123* 132*   CALCIUM mg/dL 8.2* 8.4* 7.9*   ANION GAP mmol/L 16 18 16   EGFR mL/min/1.73m*2 17* 17* 20*   PHOSPHORUS mg/dL 5.3* 4.8 4.6      Results from last 72 hours   Lab Units 07/06/24  0324 07/05/24  2100 " "07/05/24  0309 07/04/24  1818 07/04/24  0958   WBC AUTO x10*3/uL 14.4* 16.8* 17.4*   < > 22.0*   HEMOGLOBIN g/dL 9.2* 9.6* 9.4*   < > 10.3*   HEMATOCRIT % 30.7* 30.6* 29.7*   < > 32.8*   PLATELETS AUTO x10*3/uL 140* 147* 151   < > 229   NEUTROS PCT AUTO % 92.5 90.7  --   --  86.0   LYMPHS PCT AUTO % 3.3 3.2  --   --  6.1   MONOS PCT AUTO % 2.7 4.2  --   --  5.9   EOS PCT AUTO % 0.0 0.2  --   --  0.5    < > = values in this interval not displayed.        Micro/ID:     No results found for: \"URINECULTURE\", \"BLOODCULT\", \"CSFCULTSMEAR\"    Summary of key imaging results from the last 24 hours  CT HEAD WO IV CONTRAST; 7/6/2024 3:12 am   IMPRESSION:  No significant change in size of subdural hematoma overlying the posterior right cerebral convexity again measuring up to 1.1 cm in maximal thickness. The blood products are slightly less dense in several locations within the hematoma indicative of aging blood products.  Interpreted By: Jairo Toro XR CHEST 1 VIEW; 7/5/2024 5:33 pm   1.  Interval improvement in pulmonary edema.  2. Cardiomegaly versus pericardial effusion.  Interpreted By: David Jiang  Assessment and Plan     Assessment: Bhargavi Isidro is a 74 y.o. year old female patient admitted on 7/4/2024 with acute on chronic hypoxic respiratory failure    Mechanical Ventilation: none  Sedation/Analgesia:  none  Restraints: no    Summary for 07/06/24  :  Repeat Head CT showed no significant changes in SDH size   Patient switched to IV Remodulin   Urine urea fraction ordered due to lasix use     Plan:  NEUROLOGY/PSYCH:     Right sided subdural hematoma  ::SDH seen on CT scan following mechanical fall in the setting of Eliquis use for A-fib  ::Neurosurgery consulted, no operative management  ::S/p reversal with Andexxa  Management:  -Neurosurgery following, appreciate recommendations  -Repeat CT Head w/o contrast showed no significant changes in the size of the subdural hematoma  -Neuro checks q4  -Holding " Dvt Ppx until Am of July 7th      #Major depression  #Generalized anxiety disorder  Management:  -Holding Buspar in setting of renal failure  -C/w Lexapro 10mg  -C/w lamotrigine 100mg daily  -C/w Xanax 0.5mg daily PRN for anxiety     CARDIOVASCULAR:  #Heart failure with preserved ejection fraction  #Right sided heart failure  ::TTE (6/3/2024): LVEF of 55-60%, dilated RV with low normal RV systolic function and severe tricuspid regurgiation  ::RV failure likely secondary to underlying pulmonary hypertension  ::Patient has received continuous drips of LR and D10W and has received intermittent boluses since being admitted  ::Home torsemide restarted but urine output has still been low  Management:  -Holding fluids  -Lasix 80mg IV given overnight, followed by Lasix 120 mg  will approximately      #Atrial fibrillation s/p ablation and PPM  ::CHADS-VASC of 4  ::S/p AV node ablation and PPM insertion  ::On Eliquis previously  Management:  -Eliquis held in setting of intracranial bleed. S/p Andexxa. Per Neurosurgery can restart on day 14 post-fall  -Maintained on telemetry, not on rate-control currently     #CAD s/p CABG  ::CABG done about 10 years ago  ::No heart cath data in her chart  Management:  -Not on statin at home  -Per Neurosurgery must wait until post-fall day 7 before starting ASA     PULMONARY:  #Pulmonary hypertension  #Right sided heart failure  ::Has been thought to have group I, II, and III pulmonary hypertension at various times  ::Most recent RHC (10/2022) significant for RAP 20 PAP 92/45 mPAP 61 PCWP 26 CO/CI (TD) 3.1/1.74 PVR 11.9 PaSat 57. Both pre and post-capillary hypertension  ::RV dilation in TTE as above  ::Patient prescribed 5-6LNC at rest, 10-15LNC on exertion but patient apparently only uses 6LNC and tolerates saturations in the 70s  ::Patient has been on remodulin for years, refuses to use anything besides her own dilutions in her own cassette  Management:  -Holding fluids, diuresis as  above  -C/w sildenafil 20mg TID  -Patient will start IV remodulin tonight due to  lack of home med  -C/w prednisone 10mg daily. This is a chronic medication for previously suspected organizing pneumonia     RENAL/GENITOURINARY:  #Acute on chronic kidney injury, stage I, oliguric  ::Baseline creatinine of around 1.4-1.5  ::FENa 0.4% indicating prerenal DEBORA  ::Renal injury possibly secondary to cardiorenal syndrome as it has worsened despite fluid administration  ::Nephrotoxic medications include Protonix  Management:  -Renal U/S pending results  -Continue to monitor daily RFPs  -Abbott in place, strict I/O  -Avoid further nephrotoxic medications  -Nephrology consulted appreciate rec's   -FeUa ordered due to Lasix use     #Non-anion gap metabolic acidosis  #Hyperkalemia  ::Likely secondary to acute renal failure which may be secondary to cardiorenal syndrome as above  ::Potassium of 5.8 on repeat labs  Management:  -Diuresis as above  -EKG with no peaked T waves or interval prolongation from baseline  -Lokelma held pending results of next RFP     #Hyponatremia  ::Likely hypervolemic in etiology  Management:  -Consult placed to Nephro appreciate rec's   -will evaluate volume status via echo  -Continue daily RFPs     GASTROENTEROLOGY:  No acute concerns     INFECTIOUS DISEASE:  #Urinary tract infection  ::No symptoms of dysuria but symptoms of reduced concentration and fatigue  ::UA showing 11-20 WBC, 25 LE  -Ceftriaxone 1g daily (7/6-*)     HEMATOLOGY:  No acute concerns     MUSCULOSKELETAL/ SKIN:  #Left inferior pelvic ramus fracture  #Pelvic hematoma  ::Occurred after fall  ::No operative management per Ortho, no need for IR embolization  Management:  -PT/OT consult  -Per Ortho, if she fails trial of ambulation she may be a candidate for operative intervention for pelvic pain relief  -Pain regimen: Tylenol 975mg TID, PRN Dilaudid 0.4mg q3h PRN, Lyrica 75mg BID, Robadin 500mg BID PRN    ICU Check List        FEN  Fluids: Hold  Electrolytes: Lowering K  Nutrition: Regular  Prophylaxis:  DVT ppx: Held  GI ppx: Protonix  Bowel care: Miralax PRN, Lokelma  Hardware:  Catheter: Saunders  Access: PIV  Drains: Abbott  Lines: None  Social:  Code: DO NOT INTUBATE  HPOA: Jaleesa Rondon (friend) [phone]   Disposition: ICU while weaning down on oxygen    Herman Bo MD   07/06/24 at 4:09 PM     Disclaimer: Documentation completed with the information available at the time of input. The times in the chart may not be reflective of actual patient care times, interventions, or procedures. Documentation occurs after the physical care of the patient.

## 2024-07-06 NOTE — CONSULTS
NEPHROLOGY NEW CONSULT NOTE   Bhargavi Isidro   74 y.o.      MRN/Room: [id number]/25/25-A    Reason for consult: acute kidney injury, hyponatremia    HPI:  Bhargavi Isidro is a 74 y.o. female with a past medical hx of A fib (on eliquis), group I/II/III pulm HTN (on remodulin and sildenafil), COPD, chronic hypoxic resp failure (5-6L at rest, 10L home O2 baseline), LEONEL, HFpEF heart block s/p pacemaker, CAD s/p CABG who [place] [facility] [place] [facility] following a mechanical fall which resulted in a right-sided SDH and left pubic rami fracture c/b hematoma formation. S/p anticoag reversal with Andexxa. Pt remained stable without neurosurgical or orthopedic intervention. Pt was on continuous D10W due to hyperkalemia and received LR bolus due to hypotension. Transferred to MICU with increasing O2 requirements. IVF stopped upon MICU transfer.  Nephrology consulted for acute kidney injury.     Pt is awake and alert this morning. She denies any dizziness or loss of consciousness preceding her fall. She lives alone at home. Denies nausea, vomiting, fever, chills. States that she does not usually feel dyspneic when needing higher O2.    Baseline creatinine is 1.4-1.5  Creatinine at the time of admission was 1.59 and started trending up from 1.59 and now up to 2.90.  Blood pressure was systolic 80's on admission to OSH s/p IVF and 1U pRBCs.   Urine output was 254 cc last 24hrs  Recent contrast studies on 7/4: CT abd/pelvis, CT chest  Pt takes home torsemide 40 mg BID. Pt does not take any ACE/ARBs.    Recent changes in urine output: decreased (245 mL, down from 665 ml)  With bilateral LE edema, weight up 63 kg (7/4) to 71.5 kg (7/5)  Protonix 40 mg daily   Former cigarette smoker.  Hx of CAD s/p CABG, heart block s/p pacemaker, a fib on eliquis (s/p reversal upon admission)  Recent trauma with periorbital ecchymosis, left pubic rami hematoma, right SDH      In The ER: /61   Pulse 70   Temp 36.4 °C (97.5 °F)  "(Temporal)   Resp (!) 27   Ht 1.651 m (5' 5\")   Wt 71.5 kg (157 lb 10.1 oz)   SpO2 94%   BMI 26.23 kg/m²      History reviewed. No pertinent past medical history.   History reviewed. No pertinent surgical history.   No family history on file.  Social History     Socioeconomic History    Marital status:      Spouse name: Not on file    Number of children: Not on file    Years of education: Not on file    Highest education level: Not on file   Occupational History    Not on file   Tobacco Use    Smoking status: Not on file    Smokeless tobacco: Not on file   Substance and Sexual Activity    Alcohol use: Not on file    Drug use: Not on file    Sexual activity: Not on file   Other Topics Concern    Not on file   Social History Narrative    Not on file     Social Determinants of Health     Financial Resource Strain: Medium Risk (12/12/2023)    Received from [facility]    Overall Financial Resource Strain (CARDIA)     Difficulty of Paying Living Expenses: Somewhat hard   Food Insecurity: No Food Insecurity (12/12/2023)    Received from [facility]    Hunger Vital Sign     Worried About Running Out of Food in the Last Year: Never true     Ran Out of Food in the Last Year: Never true   Transportation Needs: No Transportation Needs (12/12/2023)    Received from [facility]    PRAPARE - Transportation     Lack of Transportation (Medical): No     Lack of Transportation (Non-Medical): No   Physical Activity: Inactive (12/12/2023)    Received from [facility]    Exercise Vital Sign     Days of Exercise per Week: 0 days     Minutes of Exercise per Session: 0 min   Stress: Stress Concern Present (12/12/2023)    Received from [facility]    [place] [employer] - Occupational Stress Questionnaire     Feeling of Stress : Rather much   Social Connections: Moderately Isolated (12/12/2023)    Received from [facility]    Social Connection and Isolation Panel [NHANES]     " Frequency of Communication with Friends and Family: Never     Frequency of Social Gatherings with Friends and Family: Once a week     Attends Taoist Services: More than 4 times per year     Active Member of Clubs or Organizations: Not on file     Attends Club or Organization Meetings: More than 4 times per year     Marital Status:    Intimate Partner Violence: Not on file   Housing Stability: Low Risk  (12/12/2023)    Received from [facility]    Housing Stability Vital Sign     Unable to Pay for Housing in the Last Year: No     Number of Places Lived in the Last Year: 1     Unstable Housing in the Last Year: No       Allergies   Allergen Reactions    Albuterol Unknown     a-fib    Pt. Given albuterol had bigeminy PVC's and V-Tach.    Dofetilide Unknown     Torsades    Statins-Hmg-Coa Reductase Inhibitors Unknown     leg cramps with atorvastatin, Crestor, Zocor    Sulfa (Sulfonamide Antibiotics) Unknown     Pt stated she does not think she has an allergy to sulfa drugs        Medications Prior to Admission   Medication Sig Dispense Refill Last Dose    allopurinol (Zyloprim) 300 mg tablet Take 1 tablet (300 mg) by mouth once daily.   7/3/2024    apixaban (Eliquis) 5 mg tablet Take 1 tablet (5 mg) by mouth 2 times a day.   7/3/2024    escitalopram (Lexapro) 10 mg tablet Take 1 tablet (10 mg) by mouth once daily.   7/3/2024    lamoTRIgine (LaMICtal) 100 mg tablet Take 1 tablet (100 mg) by mouth once daily.   7/3/2024    potassium chloride CR 10 mEq ER tablet Take 4 tablets (40 mEq) by mouth 3 times a day. Do not crush, chew, or split.   7/3/2024    predniSONE (Deltasone) 10 mg tablet Take 1 tablet (10 mg) by mouth once daily.   7/3/2024    pregabalin (Lyrica) 75 mg capsule Take 1 capsule (75 mg) by mouth 2 times a day.   7/3/2024    sildenafil (Revatio) 20 mg tablet Take 1 tablet (20 mg) by mouth 3 times a day.   7/3/2024    torsemide (Demadex) 20 mg tablet Take 2 tablets (40 mg) by mouth 2 times a day.    7/3/2024    ALPRAZolam (Xanax) 0.5 mg tablet Take 1 tablet (0.5 mg) by mouth once daily as needed.   Unknown    azelastine (Astelin) 137 mcg (0.1 %) nasal spray Administer 1 spray into each nostril 2 times a day. Use in each nostril as directed   Unknown    busPIRone (Buspar) 5 mg tablet Take 1 tablet (5 mg) by mouth 2 times a day.       loperamide (Imodium) 2 mg capsule Take 2 capsules (4 mg) by mouth 3 times a day.   Unknown    magnesium oxide (Mag-Ox) 400 mg tablet Take 2 tablets (800 mg) by mouth 2 times a day.       methocarbamol (Robaxin) 500 mg tablet Take 1 tablet (500 mg) by mouth 2 times a day as needed for muscle spasms.   Unknown    pantoprazole (ProtoNix) 40 mg EC tablet Take 1 tablet (40 mg) by mouth 2 times a day. Do not crush, chew, or split.   Unknown        Meds:   acetaminophen, 975 mg, TID  azelastine, 1 spray, BID  cefTRIAXone, 1 g, q24h  escitalopram, 10 mg, Daily  heparin (porcine), 5,000 Units, q8h  lamoTRIgine, 100 mg, Daily  levETIRAcetam, 500 mg, BID  pantoprazole, 40 mg, Daily before breakfast  PATIENT OWN PUMP diluent for treprostinil 42 mL PATIENT SUPPLIED, , Continuous Pump  predniSONE, 10 mg, Daily  pregabalin, 75 mg, BID  sildenafil, 20 mg, TID  sodium zirconium cyclosilicate, 10 g, TID      [Held by provider] treprostinil      HYDROmorphone, 0.4 mg, q3h PRN  methocarbamol, 500 mg, BID PRN  ondansetron, 4 mg, q8h PRN   Or  ondansetron, 4 mg, q8h PRN  [Held by provider] oxyCODONE, 2.5 mg, q4h PRN  [Held by provider] oxyCODONE, 5 mg, q4h PRN  oxygen, , Continuous PRN - O2/gases  polyethylene glycol, 17 g, Daily PRN        Vitals:    07/06/24 0800   BP: 116/61   Pulse: 70   Resp: (!) 27   Temp: 36.4 °C (97.5 °F)   SpO2: 94%        07/04 1900 - 07/06 0659  In: 738 [I.V.:688]  Out: 414 [Urine:414]   Weight change: 8.45 kg (18 lb 10.1 oz)     General appearance: AAOx3. No distress. On 8L high flow NC  Eyes: non-icteric  Skin: left-sided facial and periorbital ecchymosis  Heart: rhythm  regular. no rub  Lungs: CTA bilat.  no wheezing/crackles  Abdomen: soft, nt/nd  Extremities: 2+ edema bilat  :  Abbott  Neuro: No FND,  asterixis   ACCESS: right CVC, pIV      Blood Labs:  Results for orders placed or performed during the hospital encounter of 07/04/24 (from the past 24 hour(s))   POCT GLUCOSE   Result Value Ref Range    POCT Glucose 102 (H) 74 - 99 mg/dL   Renal function panel   Result Value Ref Range    Glucose 132 (H) 74 - 99 mg/dL    Sodium 130 (L) 136 - 145 mmol/L    Potassium 5.3 3.5 - 5.3 mmol/L    Chloride 100 98 - 107 mmol/L    Bicarbonate 19 (L) 21 - 32 mmol/L    Anion Gap 16 10 - 20 mmol/L    Urea Nitrogen 33 (H) 6 - 23 mg/dL    Creatinine 2.51 (H) 0.50 - 1.05 mg/dL    eGFR 20 (L) >60 mL/min/1.73m*2    Calcium 7.9 (L) 8.6 - 10.6 mg/dL    Phosphorus 4.6 2.5 - 4.9 mg/dL    Albumin 2.7 (L) 3.4 - 5.0 g/dL   POCT GLUCOSE   Result Value Ref Range    POCT Glucose 112 (H) 74 - 99 mg/dL   POCT GLUCOSE   Result Value Ref Range    POCT Glucose 132 (H) 74 - 99 mg/dL   Blood Gas Arterial Full Panel   Result Value Ref Range    POCT pH, Arterial 7.36 (L) 7.38 - 7.42 pH    POCT pCO2, Arterial 35 (L) 38 - 42 mm Hg    POCT pO2, Arterial 75 (L) 85 - 95 mm Hg    POCT SO2, Arterial 97 94 - 100 %    POCT Oxy Hemoglobin, Arterial 94.0 94.0 - 98.0 %    POCT Hematocrit Calculated, Arterial 29.0 (L) 36.0 - 46.0 %    POCT Sodium, Arterial 127 (L) 136 - 145 mmol/L    POCT Potassium, Arterial 6.2 (HH) 3.5 - 5.3 mmol/L    POCT Chloride, Arterial 101 98 - 107 mmol/L    POCT Ionized Calcium, Arterial 1.21 1.10 - 1.33 mmol/L    POCT Glucose, Arterial 133 (H) 74 - 99 mg/dL    POCT Lactate, Arterial 1.0 0.4 - 2.0 mmol/L    POCT Base Excess, Arterial -5.1 (L) -2.0 - 3.0 mmol/L    POCT HCO3 Calculated, Arterial 19.8 (L) 22.0 - 26.0 mmol/L    POCT Hemoglobin, Arterial 9.5 (L) 12.0 - 16.0 g/dL    POCT Anion Gap, Arterial 12 10 - 25 mmo/L    Patient Temperature 37.0 degrees Celsius    FiO2 40 %   Electrolyte Panel, Urine    Result Value Ref Range    Sodium, Urine Random 15 mmol/L    Sodium/Creatinine Ratio 22 Not established. mmol/g Creat    Potassium, Urine Random 62 mmol/L    Potassium/Creatinine Ratio 92 Not established mmol/g Creat    Chloride, Urine Random <15 mmol/L    Chloride/Creatinine Ratio      Creatinine, Urine Random 67.7 20.0 - 320.0 mg/dL   CBC and Auto Differential   Result Value Ref Range    WBC 16.8 (H) 4.4 - 11.3 x10*3/uL    nRBC 0.2 (H) 0.0 - 0.0 /100 WBCs    RBC 4.07 4.00 - 5.20 x10*6/uL    Hemoglobin 9.6 (L) 12.0 - 16.0 g/dL    Hematocrit 30.6 (L) 36.0 - 46.0 %    MCV 75 (L) 80 - 100 fL    MCH 23.6 (L) 26.0 - 34.0 pg    MCHC 31.4 (L) 32.0 - 36.0 g/dL    RDW 21.4 (H) 11.5 - 14.5 %    Platelets 147 (L) 150 - 450 x10*3/uL    Neutrophils % 90.7 40.0 - 80.0 %    Immature Granulocytes %, Automated 1.3 (H) 0.0 - 0.9 %    Lymphocytes % 3.2 13.0 - 44.0 %    Monocytes % 4.2 2.0 - 10.0 %    Eosinophils % 0.2 0.0 - 6.0 %    Basophils % 0.4 0.0 - 2.0 %    Neutrophils Absolute 15.22 (H) 1.60 - 5.50 x10*3/uL    Immature Granulocytes Absolute, Automated 0.21 0.00 - 0.50 x10*3/uL    Lymphocytes Absolute 0.53 (L) 0.80 - 3.00 x10*3/uL    Monocytes Absolute 0.71 0.05 - 0.80 x10*3/uL    Eosinophils Absolute 0.04 0.00 - 0.40 x10*3/uL    Basophils Absolute 0.06 0.00 - 0.10 x10*3/uL   Comprehensive metabolic panel   Result Value Ref Range    Glucose 123 (H) 74 - 99 mg/dL    Sodium 129 (L) 136 - 145 mmol/L    Potassium 5.8 (H) 3.5 - 5.3 mmol/L    Chloride 97 (L) 98 - 107 mmol/L    Bicarbonate 20 (L) 21 - 32 mmol/L    Anion Gap 18 10 - 20 mmol/L    Urea Nitrogen 35 (H) 6 - 23 mg/dL    Creatinine 2.82 (H) 0.50 - 1.05 mg/dL    eGFR 17 (L) >60 mL/min/1.73m*2    Calcium 8.4 (L) 8.6 - 10.6 mg/dL    Albumin 3.1 (L) 3.4 - 5.0 g/dL    Alkaline Phosphatase 83 33 - 136 U/L    Total Protein 4.9 (L) 6.4 - 8.2 g/dL    AST 10 9 - 39 U/L    Bilirubin, Total 0.9 0.0 - 1.2 mg/dL    ALT 10 7 - 45 U/L   Coagulation Screen   Result Value Ref Range     Protime 17.9 (H) 9.8 - 12.8 seconds    INR 1.6 (H) 0.9 - 1.1    aPTT 31 27 - 38 seconds   Magnesium   Result Value Ref Range    Magnesium 2.08 1.60 - 2.40 mg/dL   Phosphorus   Result Value Ref Range    Phosphorus 4.8 2.5 - 4.9 mg/dL   Blood Gas Venous Full Panel   Result Value Ref Range    POCT pH, Venous 7.29 (L) 7.33 - 7.43 pH    POCT pCO2, Venous 41 41 - 51 mm Hg    POCT pO2, Venous 31 (L) 35 - 45 mm Hg    POCT SO2, Venous 48 45 - 75 %    POCT Oxy Hemoglobin, Venous 46.5 45.0 - 75.0 %    POCT Hematocrit Calculated, Venous 30.0 (L) 36.0 - 46.0 %    POCT Sodium, Venous 127 (L) 136 - 145 mmol/L    POCT Potassium, Venous 6.3 (HH) 3.5 - 5.3 mmol/L    POCT Chloride, Venous 100 98 - 107 mmol/L    POCT Ionized Calicum, Venous 1.17 1.10 - 1.33 mmol/L    POCT Glucose, Venous 123 (H) 74 - 99 mg/dL    POCT Lactate, Venous 1.6 0.4 - 2.0 mmol/L    POCT Base Excess, Venous -6.5 (L) -2.0 - 3.0 mmol/L    POCT HCO3 Calculated, Venous 19.7 (L) 22.0 - 26.0 mmol/L    POCT Hemoglobin, Venous 10.1 (L) 12.0 - 16.0 g/dL    POCT Anion Gap, Venous 14.0 10.0 - 25.0 mmol/L    Patient Temperature 37.0 degrees Celsius    FiO2 100 %   Morphology   Result Value Ref Range    RBC Morphology See Below     Saul Cells Few    Urinalysis with Reflex Culture and Microscopic   Result Value Ref Range    Color, Urine Light-Yellow Light-Yellow, Yellow, Dark-Yellow    Appearance, Urine Turbid (N) Clear    Specific Gravity, Urine 1.028 1.005 - 1.035    pH, Urine 5.0 5.0, 5.5, 6.0, 6.5, 7.0, 7.5, 8.0    Protein, Urine 20 (TRACE) NEGATIVE, 10 (TRACE), 20 (TRACE) mg/dL    Glucose, Urine Normal Normal mg/dL    Blood, Urine 0.2 (2+) (A) NEGATIVE    Ketones, Urine NEGATIVE NEGATIVE mg/dL    Bilirubin, Urine NEGATIVE NEGATIVE    Urobilinogen, Urine Normal Normal mg/dL    Nitrite, Urine NEGATIVE NEGATIVE    Leukocyte Esterase, Urine 25 Brant/µL (A) NEGATIVE   Microscopic Only, Urine   Result Value Ref Range    WBC, Urine 11-20 (A) 1-5, NONE /HPF    RBC, Urine >20 (A)  NONE, 1-2, 3-5 /HPF    Squamous Epithelial Cells, Urine 1-9 (SPARSE) Reference range not established. /HPF    Renal Epithelial Cells, Urine 1-2 (FEW) Reference range not established. /HPF    Mucus, Urine FEW Reference range not established. /LPF    Hyaline Casts, Urine 2+ (A) NONE /LPF    Mixed Cell Casts, Urine OCCASIONAL (A) NONE /LPF   Renal function panel   Result Value Ref Range    Glucose 111 (H) 74 - 99 mg/dL    Sodium 128 (L) 136 - 145 mmol/L    Potassium 5.8 (H) 3.5 - 5.3 mmol/L    Chloride 98 98 - 107 mmol/L    Bicarbonate 20 (L) 21 - 32 mmol/L    Anion Gap 16 10 - 20 mmol/L    Urea Nitrogen 37 (H) 6 - 23 mg/dL    Creatinine 2.90 (H) 0.50 - 1.05 mg/dL    eGFR 17 (L) >60 mL/min/1.73m*2    Calcium 8.2 (L) 8.6 - 10.6 mg/dL    Phosphorus 5.3 (H) 2.5 - 4.9 mg/dL    Albumin 3.0 (L) 3.4 - 5.0 g/dL   Magnesium   Result Value Ref Range    Magnesium 2.18 1.60 - 2.40 mg/dL   CBC and Auto Differential   Result Value Ref Range    WBC 14.4 (H) 4.4 - 11.3 x10*3/uL    nRBC 0.0 0.0 - 0.0 /100 WBCs    RBC 3.93 (L) 4.00 - 5.20 x10*6/uL    Hemoglobin 9.2 (L) 12.0 - 16.0 g/dL    Hematocrit 30.7 (L) 36.0 - 46.0 %    MCV 78 (L) 80 - 100 fL    MCH 23.4 (L) 26.0 - 34.0 pg    MCHC 30.0 (L) 32.0 - 36.0 g/dL    RDW 21.6 (H) 11.5 - 14.5 %    Platelets 140 (L) 150 - 450 x10*3/uL    Neutrophils % 92.5 40.0 - 80.0 %    Immature Granulocytes %, Automated 1.3 (H) 0.0 - 0.9 %    Lymphocytes % 3.3 13.0 - 44.0 %    Monocytes % 2.7 2.0 - 10.0 %    Eosinophils % 0.0 0.0 - 6.0 %    Basophils % 0.2 0.0 - 2.0 %    Neutrophils Absolute 13.33 (H) 1.60 - 5.50 x10*3/uL    Immature Granulocytes Absolute, Automated 0.19 0.00 - 0.50 x10*3/uL    Lymphocytes Absolute 0.48 (L) 0.80 - 3.00 x10*3/uL    Monocytes Absolute 0.39 0.05 - 0.80 x10*3/uL    Eosinophils Absolute 0.00 0.00 - 0.40 x10*3/uL    Basophils Absolute 0.03 0.00 - 0.10 x10*3/uL   Morphology   Result Value Ref Range    RBC Morphology See Below     Bardwell Cells Few    Osmolality   Result Value Ref  Range    Osmolality, Serum 284 280 - 300 mOsm/kg   POCT GLUCOSE   Result Value Ref Range    POCT Glucose 122 (H) 74 - 99 mg/dL         ASSESSMENT:  Bhargavi Isidro is a 74 y.o. female with a past medical hx of A fib (on eliquis), group I/II/III pulm HTN (on remodulin and sildenafil), COPD, chronic hypoxic resp failure (5-6L at rest, 10L home O2 baseline), LEONEL, HFpEF heart block s/p pacemaker, CAD s/p CABG who is admitted for a right-sided SDH and left pubic rami fracture c/b hematoma formation following a mechanical fall. S/p anticoag reversal with Andexxa. Pt is stable without neurosurgical or orthopedic intervention, now transferred to the MICU with high oxygen requirements. Nephrology consulted for acute kidney injury. Ddx of DEBORA includes cardiorenal syndrome i/s/o pHTN and right-sided heart failure and ischemic ATN given hypotension.     #DEBORA, Stage I on CKD III, oliguric  - Baseline creatinine: 1.4-1.5  - Etiology: cardiorenal syndrome v ischemic ATN  - UA showed:  2+ blood, leuk esterase, WBC's  - Urine electrolyes showed FeNA/FeUrea of 0.5% consistent prerenal   - Clinical volume status: hypervolemic  - Electrolytes: hyponatremia, hyperkalemia, hypocalcemia  - Acid base status: Non-anion gap metabolic acidosis    Hemodynamics  - Blood pressure ranging /37-95 this admission. Not on any antihypertensives or inotropes. Pt's baseline BP ranging from  / 55-65. Home sildenafil resumed.    Medications (causative)   - On protonix     #Hyperkalemia  -K 5.8  -Possibly s/t hematoma resorption     #Hyponatremia  -Na 128  -Serum Osm 284  -Cortney 15    Recommendations:  - Optimize BP's  - Monitor urine output; okay to continue lasix  - Continue lokelma 10g TID and low potassium diet  - Indication for dialysis:  No   - Avoid nephrotoxins, contrast if possible  - strict Is/Os  - Renal dosing for medications for latest eGFR, follow medication trough as appropriate  - Avoid hypotension/rapid fluctuations in  BPs    Obie Varela MD  Nephrology Resident  24 hour Renal Pager - [phone]    Discussed with attending nephrologist Dr. Bhardwaj

## 2024-07-06 NOTE — PROGRESS NOTES
"Bhargavi Isidro is a 74 y.o. female on day 2 of admission presenting with SDH (subdural hematoma) (Multi).    Subjective   Patient was transferred to MICU for hypoxic respiratory failure    Objective     Physical Exam  NAD, A&Ox3, EOV  PERRL, EOMI, Face symmetric, Facial SILT, Palate/Tongue midline and symmetric,  RUE 5  RLE prox 4 dist 5  LUE 4+  LLE prox 3 dist 5  SILT    Last Recorded Vitals  Blood pressure 103/52, pulse 70, temperature 35.7 °C (96.3 °F), temperature source Temporal, resp. rate 14, height 1.651 m (5' 5\"), weight 71.5 kg (157 lb 10.1 oz), SpO2 94%.  Intake/Output last 3 Shifts:  I/O last 3 completed shifts:  In: 913 (14.5 mL/kg) [I.V.:913 (14.5 mL/kg)]  Out: 740 (11.7 mL/kg) [Urine:740 (0.3 mL/kg/hr)]  Weight: 63 kg     Relevant Results  Results for orders placed or performed during the hospital encounter of 07/04/24 (from the past 24 hour(s))   ECG 12 Lead   Result Value Ref Range    Ventricular Rate 70 BPM    Atrial Rate 441 BPM    QRS Duration 92 ms    QT Interval 448 ms    QTC Calculation(Bazett) 483 ms    R Axis -45 degrees    T Axis 114 degrees    QRS Count 12 beats    Q Onset 228 ms    T Offset 452 ms    QTC Fredericia 471 ms   POCT GLUCOSE   Result Value Ref Range    POCT Glucose 152 (H) 74 - 99 mg/dL   POCT GLUCOSE   Result Value Ref Range    POCT Glucose 102 (H) 74 - 99 mg/dL   Renal function panel   Result Value Ref Range    Glucose 132 (H) 74 - 99 mg/dL    Sodium 130 (L) 136 - 145 mmol/L    Potassium 5.3 3.5 - 5.3 mmol/L    Chloride 100 98 - 107 mmol/L    Bicarbonate 19 (L) 21 - 32 mmol/L    Anion Gap 16 10 - 20 mmol/L    Urea Nitrogen 33 (H) 6 - 23 mg/dL    Creatinine 2.51 (H) 0.50 - 1.05 mg/dL    eGFR 20 (L) >60 mL/min/1.73m*2    Calcium 7.9 (L) 8.6 - 10.6 mg/dL    Phosphorus 4.6 2.5 - 4.9 mg/dL    Albumin 2.7 (L) 3.4 - 5.0 g/dL   POCT GLUCOSE   Result Value Ref Range    POCT Glucose 112 (H) 74 - 99 mg/dL   POCT GLUCOSE   Result Value Ref Range    POCT Glucose 132 (H) 74 - 99 mg/dL "   Blood Gas Arterial Full Panel   Result Value Ref Range    POCT pH, Arterial 7.36 (L) 7.38 - 7.42 pH    POCT pCO2, Arterial 35 (L) 38 - 42 mm Hg    POCT pO2, Arterial 75 (L) 85 - 95 mm Hg    POCT SO2, Arterial 97 94 - 100 %    POCT Oxy Hemoglobin, Arterial 94.0 94.0 - 98.0 %    POCT Hematocrit Calculated, Arterial 29.0 (L) 36.0 - 46.0 %    POCT Sodium, Arterial 127 (L) 136 - 145 mmol/L    POCT Potassium, Arterial 6.2 (HH) 3.5 - 5.3 mmol/L    POCT Chloride, Arterial 101 98 - 107 mmol/L    POCT Ionized Calcium, Arterial 1.21 1.10 - 1.33 mmol/L    POCT Glucose, Arterial 133 (H) 74 - 99 mg/dL    POCT Lactate, Arterial 1.0 0.4 - 2.0 mmol/L    POCT Base Excess, Arterial -5.1 (L) -2.0 - 3.0 mmol/L    POCT HCO3 Calculated, Arterial 19.8 (L) 22.0 - 26.0 mmol/L    POCT Hemoglobin, Arterial 9.5 (L) 12.0 - 16.0 g/dL    POCT Anion Gap, Arterial 12 10 - 25 mmo/L    Patient Temperature 37.0 degrees Celsius    FiO2 40 %   Electrolyte Panel, Urine   Result Value Ref Range    Sodium, Urine Random 15 mmol/L    Sodium/Creatinine Ratio 22 Not established. mmol/g Creat    Potassium, Urine Random 62 mmol/L    Potassium/Creatinine Ratio 92 Not established mmol/g Creat    Chloride, Urine Random <15 mmol/L    Chloride/Creatinine Ratio      Creatinine, Urine Random 67.7 20.0 - 320.0 mg/dL   CBC and Auto Differential   Result Value Ref Range    WBC 16.8 (H) 4.4 - 11.3 x10*3/uL    nRBC 0.2 (H) 0.0 - 0.0 /100 WBCs    RBC 4.07 4.00 - 5.20 x10*6/uL    Hemoglobin 9.6 (L) 12.0 - 16.0 g/dL    Hematocrit 30.6 (L) 36.0 - 46.0 %    MCV 75 (L) 80 - 100 fL    MCH 23.6 (L) 26.0 - 34.0 pg    MCHC 31.4 (L) 32.0 - 36.0 g/dL    RDW 21.4 (H) 11.5 - 14.5 %    Platelets 147 (L) 150 - 450 x10*3/uL    Neutrophils % 90.7 40.0 - 80.0 %    Immature Granulocytes %, Automated 1.3 (H) 0.0 - 0.9 %    Lymphocytes % 3.2 13.0 - 44.0 %    Monocytes % 4.2 2.0 - 10.0 %    Eosinophils % 0.2 0.0 - 6.0 %    Basophils % 0.4 0.0 - 2.0 %    Neutrophils Absolute 15.22 (H) 1.60 - 5.50  x10*3/uL    Immature Granulocytes Absolute, Automated 0.21 0.00 - 0.50 x10*3/uL    Lymphocytes Absolute 0.53 (L) 0.80 - 3.00 x10*3/uL    Monocytes Absolute 0.71 0.05 - 0.80 x10*3/uL    Eosinophils Absolute 0.04 0.00 - 0.40 x10*3/uL    Basophils Absolute 0.06 0.00 - 0.10 x10*3/uL   Comprehensive metabolic panel   Result Value Ref Range    Glucose 123 (H) 74 - 99 mg/dL    Sodium 129 (L) 136 - 145 mmol/L    Potassium 5.8 (H) 3.5 - 5.3 mmol/L    Chloride 97 (L) 98 - 107 mmol/L    Bicarbonate 20 (L) 21 - 32 mmol/L    Anion Gap 18 10 - 20 mmol/L    Urea Nitrogen 35 (H) 6 - 23 mg/dL    Creatinine 2.82 (H) 0.50 - 1.05 mg/dL    eGFR 17 (L) >60 mL/min/1.73m*2    Calcium 8.4 (L) 8.6 - 10.6 mg/dL    Albumin 3.1 (L) 3.4 - 5.0 g/dL    Alkaline Phosphatase 83 33 - 136 U/L    Total Protein 4.9 (L) 6.4 - 8.2 g/dL    AST 10 9 - 39 U/L    Bilirubin, Total 0.9 0.0 - 1.2 mg/dL    ALT 10 7 - 45 U/L   Coagulation Screen   Result Value Ref Range    Protime 17.9 (H) 9.8 - 12.8 seconds    INR 1.6 (H) 0.9 - 1.1    aPTT 31 27 - 38 seconds   Magnesium   Result Value Ref Range    Magnesium 2.08 1.60 - 2.40 mg/dL   Phosphorus   Result Value Ref Range    Phosphorus 4.8 2.5 - 4.9 mg/dL   Blood Gas Venous Full Panel   Result Value Ref Range    POCT pH, Venous 7.29 (L) 7.33 - 7.43 pH    POCT pCO2, Venous 41 41 - 51 mm Hg    POCT pO2, Venous 31 (L) 35 - 45 mm Hg    POCT SO2, Venous 48 45 - 75 %    POCT Oxy Hemoglobin, Venous 46.5 45.0 - 75.0 %    POCT Hematocrit Calculated, Venous 30.0 (L) 36.0 - 46.0 %    POCT Sodium, Venous 127 (L) 136 - 145 mmol/L    POCT Potassium, Venous 6.3 (HH) 3.5 - 5.3 mmol/L    POCT Chloride, Venous 100 98 - 107 mmol/L    POCT Ionized Calicum, Venous 1.17 1.10 - 1.33 mmol/L    POCT Glucose, Venous 123 (H) 74 - 99 mg/dL    POCT Lactate, Venous 1.6 0.4 - 2.0 mmol/L    POCT Base Excess, Venous -6.5 (L) -2.0 - 3.0 mmol/L    POCT HCO3 Calculated, Venous 19.7 (L) 22.0 - 26.0 mmol/L    POCT Hemoglobin, Venous 10.1 (L) 12.0 - 16.0  g/dL    POCT Anion Gap, Venous 14.0 10.0 - 25.0 mmol/L    Patient Temperature 37.0 degrees Celsius    FiO2 100 %   Morphology   Result Value Ref Range    RBC Morphology See Below     Saul Cells Few    Urinalysis with Reflex Culture and Microscopic   Result Value Ref Range    Color, Urine Light-Yellow Light-Yellow, Yellow, Dark-Yellow    Appearance, Urine Turbid (N) Clear    Specific Gravity, Urine 1.028 1.005 - 1.035    pH, Urine 5.0 5.0, 5.5, 6.0, 6.5, 7.0, 7.5, 8.0    Protein, Urine 20 (TRACE) NEGATIVE, 10 (TRACE), 20 (TRACE) mg/dL    Glucose, Urine Normal Normal mg/dL    Blood, Urine 0.2 (2+) (A) NEGATIVE    Ketones, Urine NEGATIVE NEGATIVE mg/dL    Bilirubin, Urine NEGATIVE NEGATIVE    Urobilinogen, Urine Normal Normal mg/dL    Nitrite, Urine NEGATIVE NEGATIVE    Leukocyte Esterase, Urine 25 Brant/µL (A) NEGATIVE   Microscopic Only, Urine   Result Value Ref Range    WBC, Urine 11-20 (A) 1-5, NONE /HPF    RBC, Urine >20 (A) NONE, 1-2, 3-5 /HPF    Squamous Epithelial Cells, Urine 1-9 (SPARSE) Reference range not established. /HPF    Renal Epithelial Cells, Urine 1-2 (FEW) Reference range not established. /HPF    Mucus, Urine FEW Reference range not established. /LPF    Hyaline Casts, Urine 2+ (A) NONE /LPF    Mixed Cell Casts, Urine OCCASIONAL (A) NONE /LPF   Renal function panel   Result Value Ref Range    Glucose 111 (H) 74 - 99 mg/dL    Sodium 128 (L) 136 - 145 mmol/L    Potassium 5.8 (H) 3.5 - 5.3 mmol/L    Chloride 98 98 - 107 mmol/L    Bicarbonate 20 (L) 21 - 32 mmol/L    Anion Gap 16 10 - 20 mmol/L    Urea Nitrogen 37 (H) 6 - 23 mg/dL    Creatinine 2.90 (H) 0.50 - 1.05 mg/dL    eGFR 17 (L) >60 mL/min/1.73m*2    Calcium 8.2 (L) 8.6 - 10.6 mg/dL    Phosphorus 5.3 (H) 2.5 - 4.9 mg/dL    Albumin 3.0 (L) 3.4 - 5.0 g/dL   Magnesium   Result Value Ref Range    Magnesium 2.18 1.60 - 2.40 mg/dL   CBC and Auto Differential   Result Value Ref Range    WBC 14.4 (H) 4.4 - 11.3 x10*3/uL    nRBC 0.0 0.0 - 0.0 /100 WBCs     RBC 3.93 (L) 4.00 - 5.20 x10*6/uL    Hemoglobin 9.2 (L) 12.0 - 16.0 g/dL    Hematocrit 30.7 (L) 36.0 - 46.0 %    MCV 78 (L) 80 - 100 fL    MCH 23.4 (L) 26.0 - 34.0 pg    MCHC 30.0 (L) 32.0 - 36.0 g/dL    RDW 21.6 (H) 11.5 - 14.5 %    Platelets 140 (L) 150 - 450 x10*3/uL    Neutrophils % 92.5 40.0 - 80.0 %    Immature Granulocytes %, Automated 1.3 (H) 0.0 - 0.9 %    Lymphocytes % 3.3 13.0 - 44.0 %    Monocytes % 2.7 2.0 - 10.0 %    Eosinophils % 0.0 0.0 - 6.0 %    Basophils % 0.2 0.0 - 2.0 %    Neutrophils Absolute 13.33 (H) 1.60 - 5.50 x10*3/uL    Immature Granulocytes Absolute, Automated 0.19 0.00 - 0.50 x10*3/uL    Lymphocytes Absolute 0.48 (L) 0.80 - 3.00 x10*3/uL    Monocytes Absolute 0.39 0.05 - 0.80 x10*3/uL    Eosinophils Absolute 0.00 0.00 - 0.40 x10*3/uL    Basophils Absolute 0.03 0.00 - 0.10 x10*3/uL   Morphology   Result Value Ref Range    RBC Morphology See Below     Fenton Cells Few      Assessment/Plan   Principal Problem:    SDH (subdural hematoma) (Multi)    Bhargavi Isidro is a 74 y.o. female with h/o HTN, HLD, CAD , STEMI s/p CABG, diastolic heart failure, congenital heart block s/p pacemeaker, PAH, Afib ((on eliquis, s/p reversal at OSH), CKD stage IV, chronc hypoxic resp failure, COPD (on home 5L NC), LEONEL, OA, chronic spine degen, GERD, p/w fall, CTH SDH, 7/5 rCTH stable     Recs  MICU primary  7/6 CTH stable, no further imaging needed   Con't to hold ASA,ok to restart at post-bleed day 7  Con't to hold Eliquis, ok to restart post-bleed day 14   Ok for DVT prophylaxis post-bleed day 2  Will arrange 2 week follow up with [facility]   We will sign off at this time  Rest per primary     Arie Urban MD

## 2024-07-07 ENCOUNTER — APPOINTMENT (OUTPATIENT)
Dept: RADIOLOGY | Facility: HOSPITAL | Age: 74
DRG: 963 | End: 2024-07-07
Payer: MEDICARE

## 2024-07-07 VITALS
RESPIRATION RATE: 11 BRPM | OXYGEN SATURATION: 98 % | HEART RATE: 70 BPM | DIASTOLIC BLOOD PRESSURE: 41 MMHG | BODY MASS INDEX: 26.52 KG/M2 | TEMPERATURE: 98.4 F | SYSTOLIC BLOOD PRESSURE: 105 MMHG | WEIGHT: 159.17 LBS | HEIGHT: 65 IN

## 2024-07-07 LAB
ALBUMIN SERPL BCP-MCNC: 2.9 G/DL (ref 3.4–5)
ANION GAP BLDA CALCULATED.4IONS-SCNC: 11 MMO/L (ref 10–25)
ANION GAP SERPL CALC-SCNC: 17 MMOL/L (ref 10–20)
AORTIC VALVE PEAK VELOCITY: 1.57 M/S
AV PEAK GRADIENT: 9.9 MMHG
AVA (PEAK VEL): 2.26 CM2
BACTERIA BLD CULT: NORMAL
BACTERIA UR CULT: NO GROWTH
BASE EXCESS BLDA CALC-SCNC: -6.4 MMOL/L (ref -2–3)
BASOPHILS # BLD AUTO: 0.03 X10*3/UL (ref 0–0.1)
BASOPHILS NFR BLD AUTO: 0.2 %
BODY TEMPERATURE: 37 DEGREES CELSIUS
BUN SERPL-MCNC: 43 MG/DL (ref 6–23)
BURR CELLS BLD QL SMEAR: NORMAL
CA-I BLDA-SCNC: 1.09 MMOL/L (ref 1.1–1.33)
CALCIUM SERPL-MCNC: 7.9 MG/DL (ref 8.6–10.6)
CARDIAC TROPONIN I PNL SERPL HS: 11 NG/L (ref 0–34)
CHLORIDE BLDA-SCNC: 98 MMOL/L (ref 98–107)
CHLORIDE SERPL-SCNC: 97 MMOL/L (ref 98–107)
CO2 SERPL-SCNC: 19 MMOL/L (ref 21–32)
CREAT SERPL-MCNC: 3.46 MG/DL (ref 0.5–1.05)
CREAT UR-MCNC: 77.3 MG/DL (ref 20–320)
EGFRCR SERPLBLD CKD-EPI 2021: 13 ML/MIN/1.73M*2
EJECTION FRACTION APICAL 4 CHAMBER: 70.8
EJECTION FRACTION: 73 %
EOSINOPHIL # BLD AUTO: 0.06 X10*3/UL (ref 0–0.4)
EOSINOPHIL NFR BLD AUTO: 0.4 %
ERYTHROCYTE [DISTWIDTH] IN BLOOD BY AUTOMATED COUNT: 21.9 % (ref 11.5–14.5)
ERYTHROCYTE [DISTWIDTH] IN BLOOD BY AUTOMATED COUNT: 21.9 % (ref 11.5–14.5)
GLUCOSE BLD MANUAL STRIP-MCNC: 100 MG/DL (ref 74–99)
GLUCOSE BLD MANUAL STRIP-MCNC: 120 MG/DL (ref 74–99)
GLUCOSE BLD MANUAL STRIP-MCNC: 128 MG/DL (ref 74–99)
GLUCOSE BLDA-MCNC: 115 MG/DL (ref 74–99)
GLUCOSE SERPL-MCNC: 118 MG/DL (ref 74–99)
HCO3 BLDA-SCNC: 19.1 MMOL/L (ref 22–26)
HCT VFR BLD AUTO: 29.1 % (ref 36–46)
HCT VFR BLD AUTO: 29.1 % (ref 36–46)
HCT VFR BLD EST: 29 % (ref 36–46)
HGB BLD-MCNC: 8.9 G/DL (ref 12–16)
HGB BLD-MCNC: 8.9 G/DL (ref 12–16)
HGB BLDA-MCNC: 9.5 G/DL (ref 12–16)
HOLD SPECIMEN: NORMAL
IMM GRANULOCYTES # BLD AUTO: 0.18 X10*3/UL (ref 0–0.5)
IMM GRANULOCYTES NFR BLD AUTO: 1.2 % (ref 0–0.9)
INHALED O2 CONCENTRATION: 60 %
LACTATE BLDA-SCNC: 0.6 MMOL/L (ref 0.4–2)
LEFT VENTRICLE INTERNAL DIMENSION DIASTOLE: 4.2 CM (ref 3.5–6)
LEFT VENTRICULAR OUTFLOW TRACT DIAMETER: 1.9 CM
LYMPHOCYTES # BLD AUTO: 0.77 X10*3/UL (ref 0.8–3)
LYMPHOCYTES NFR BLD AUTO: 5.1 %
MAGNESIUM SERPL-MCNC: 2.2 MG/DL (ref 1.6–2.4)
MCH RBC QN AUTO: 23.7 PG (ref 26–34)
MCH RBC QN AUTO: 23.7 PG (ref 26–34)
MCHC RBC AUTO-ENTMCNC: 30.6 G/DL (ref 32–36)
MCHC RBC AUTO-ENTMCNC: 30.6 G/DL (ref 32–36)
MCV RBC AUTO: 78 FL (ref 80–100)
MCV RBC AUTO: 78 FL (ref 80–100)
MITRAL VALVE E/A RATIO: 2.89
MITRAL VALVE E/E' RATIO: 12.44
MONOCYTES # BLD AUTO: 0.8 X10*3/UL (ref 0.05–0.8)
MONOCYTES NFR BLD AUTO: 5.3 %
NEUTROPHILS # BLD AUTO: 13.17 X10*3/UL (ref 1.6–5.5)
NEUTROPHILS NFR BLD AUTO: 87.8 %
NRBC BLD-RTO: 0.3 /100 WBCS (ref 0–0)
NRBC BLD-RTO: 0.3 /100 WBCS (ref 0–0)
OVALOCYTES BLD QL SMEAR: NORMAL
OXYHGB MFR BLDA: 94.5 % (ref 94–98)
PCO2 BLDA: 37 MM HG (ref 38–42)
PH BLDA: 7.32 PH (ref 7.38–7.42)
PHOSPHATE SERPL-MCNC: 7 MG/DL (ref 2.5–4.9)
PLATELET # BLD AUTO: 149 X10*3/UL (ref 150–450)
PLATELET # BLD AUTO: 149 X10*3/UL (ref 150–450)
PO2 BLDA: 79 MM HG (ref 85–95)
POLYCHROMASIA BLD QL SMEAR: NORMAL
POTASSIUM BLDA-SCNC: 4.5 MMOL/L (ref 3.5–5.3)
POTASSIUM SERPL-SCNC: 4.8 MMOL/L (ref 3.5–5.3)
RBC # BLD AUTO: 3.75 X10*6/UL (ref 4–5.2)
RBC # BLD AUTO: 3.75 X10*6/UL (ref 4–5.2)
RBC MORPH BLD: NORMAL
RIGHT VENTRICLE FREE WALL PEAK S': 9.9 CM/S
RIGHT VENTRICLE PEAK SYSTOLIC PRESSURE: 90.3 MMHG
SAO2 % BLDA: 97 % (ref 94–100)
SCHISTOCYTES BLD QL SMEAR: NORMAL
SODIUM BLDA-SCNC: 124 MMOL/L (ref 136–145)
SODIUM SERPL-SCNC: 128 MMOL/L (ref 136–145)
TRICUSPID ANNULAR PLANE SYSTOLIC EXCURSION: 1.1 CM
UREA/CREAT UR-SRTO: 3.4 G/G CREAT
UUN UR-MCNC: 264 MG/DL
WBC # BLD AUTO: 15 X10*3/UL (ref 4.4–11.3)
WBC # BLD AUTO: 15 X10*3/UL (ref 4.4–11.3)

## 2024-07-07 PROCEDURE — 70450 CT HEAD/BRAIN W/O DYE: CPT

## 2024-07-07 PROCEDURE — 84484 ASSAY OF TROPONIN QUANT: CPT

## 2024-07-07 PROCEDURE — 37799 UNLISTED PX VASCULAR SURGERY: CPT

## 2024-07-07 PROCEDURE — 2500000004 HC RX 250 GENERAL PHARMACY W/ HCPCS (ALT 636 FOR OP/ED)

## 2024-07-07 PROCEDURE — 99233 SBSQ HOSP IP/OBS HIGH 50: CPT

## 2024-07-07 PROCEDURE — 82947 ASSAY GLUCOSE BLOOD QUANT: CPT

## 2024-07-07 PROCEDURE — 71045 X-RAY EXAM CHEST 1 VIEW: CPT

## 2024-07-07 PROCEDURE — 2500000002 HC RX 250 W HCPCS SELF ADMINISTERED DRUGS (ALT 637 FOR MEDICARE OP, ALT 636 FOR OP/ED): Performed by: INTERNAL MEDICINE

## 2024-07-07 PROCEDURE — 2500000001 HC RX 250 WO HCPCS SELF ADMINISTERED DRUGS (ALT 637 FOR MEDICARE OP)

## 2024-07-07 PROCEDURE — 84132 ASSAY OF SERUM POTASSIUM: CPT

## 2024-07-07 PROCEDURE — 99291 CRITICAL CARE FIRST HOUR: CPT

## 2024-07-07 PROCEDURE — 85027 COMPLETE CBC AUTOMATED: CPT

## 2024-07-07 PROCEDURE — 2500000005 HC RX 250 GENERAL PHARMACY W/O HCPCS: Performed by: STUDENT IN AN ORGANIZED HEALTH CARE EDUCATION/TRAINING PROGRAM

## 2024-07-07 PROCEDURE — 36415 COLL VENOUS BLD VENIPUNCTURE: CPT

## 2024-07-07 PROCEDURE — 82810 BLOOD GASES O2 SAT ONLY: CPT

## 2024-07-07 PROCEDURE — 2020000001 HC ICU ROOM DAILY

## 2024-07-07 PROCEDURE — 80069 RENAL FUNCTION PANEL: CPT

## 2024-07-07 PROCEDURE — 82570 ASSAY OF URINE CREATININE: CPT

## 2024-07-07 PROCEDURE — 99292 CRITICAL CARE ADDL 30 MIN: CPT

## 2024-07-07 PROCEDURE — 2500000002 HC RX 250 W HCPCS SELF ADMINISTERED DRUGS (ALT 637 FOR MEDICARE OP, ALT 636 FOR OP/ED)

## 2024-07-07 PROCEDURE — 71045 X-RAY EXAM CHEST 1 VIEW: CPT | Performed by: RADIOLOGY

## 2024-07-07 PROCEDURE — 70450 CT HEAD/BRAIN W/O DYE: CPT | Performed by: RADIOLOGY

## 2024-07-07 PROCEDURE — 85025 COMPLETE CBC W/AUTO DIFF WBC: CPT

## 2024-07-07 PROCEDURE — 87040 BLOOD CULTURE FOR BACTERIA: CPT

## 2024-07-07 PROCEDURE — 83735 ASSAY OF MAGNESIUM: CPT

## 2024-07-07 RX ORDER — DOPAMINE HYDROCHLORIDE 160 MG/100ML
1-5 INJECTION, SOLUTION INTRAVENOUS CONTINUOUS
Status: DISCONTINUED | OUTPATIENT
Start: 2024-07-07 | End: 2024-07-07

## 2024-07-07 RX ORDER — NOREPINEPHRINE BITARTRATE/D5W 8 MG/250ML
0-.2 PLASTIC BAG, INJECTION (ML) INTRAVENOUS CONTINUOUS
Status: DISCONTINUED | OUTPATIENT
Start: 2024-07-07 | End: 2024-07-09

## 2024-07-07 RX ORDER — DOPAMINE HYDROCHLORIDE 160 MG/100ML
1-5 INJECTION, SOLUTION INTRAVENOUS CONTINUOUS
Status: DISCONTINUED | OUTPATIENT
Start: 2024-07-07 | End: 2024-07-09

## 2024-07-07 RX ORDER — FUROSEMIDE 10 MG/ML
120 INJECTION INTRAMUSCULAR; INTRAVENOUS ONCE
Status: COMPLETED | OUTPATIENT
Start: 2024-07-07 | End: 2024-07-08

## 2024-07-07 RX ADMIN — SODIUM ZIRCONIUM CYCLOSILICATE 10 G: 10 POWDER, FOR SUSPENSION ORAL at 09:49

## 2024-07-07 RX ADMIN — AZELASTINE HYDROCHLORIDE 1 SPRAY: 137 SPRAY, METERED NASAL at 20:19

## 2024-07-07 RX ADMIN — TREPROSTINIL 39 NG/KG/MIN: 5 INJECTION, SOLUTION INTRAVENOUS; SUBCUTANEOUS at 15:18

## 2024-07-07 RX ADMIN — PREGABALIN 75 MG: 25 CAPSULE ORAL at 20:19

## 2024-07-07 RX ADMIN — PREDNISONE 10 MG: 20 TABLET ORAL at 09:49

## 2024-07-07 RX ADMIN — ACETAMINOPHEN 975 MG: 325 TABLET ORAL at 09:48

## 2024-07-07 RX ADMIN — CEFTRIAXONE SODIUM 1 G: 1 INJECTION, SOLUTION INTRAVENOUS at 00:25

## 2024-07-07 RX ADMIN — LEVETIRACETAM 500 MG: 500 TABLET, FILM COATED ORAL at 20:19

## 2024-07-07 RX ADMIN — SODIUM ZIRCONIUM CYCLOSILICATE 10 G: 10 POWDER, FOR SUSPENSION ORAL at 20:19

## 2024-07-07 RX ADMIN — Medication 12 L/MIN: at 19:45

## 2024-07-07 RX ADMIN — DOPAMINE HYDROCHLORIDE 5 MCG/KG/MIN: 160 INJECTION, SOLUTION INTRAVENOUS at 13:05

## 2024-07-07 RX ADMIN — ACETAMINOPHEN 975 MG: 325 TABLET ORAL at 20:19

## 2024-07-07 RX ADMIN — PANTOPRAZOLE SODIUM 40 MG: 40 TABLET, DELAYED RELEASE ORAL at 09:53

## 2024-07-07 RX ADMIN — PREGABALIN 75 MG: 25 CAPSULE ORAL at 09:49

## 2024-07-07 RX ADMIN — SILDENAFIL 20 MG: 20 TABLET ORAL at 09:49

## 2024-07-07 RX ADMIN — LAMOTRIGINE 100 MG: 100 TABLET ORAL at 09:49

## 2024-07-07 RX ADMIN — ESCITALOPRAM 10 MG: 10 TABLET, FILM COATED ORAL at 09:49

## 2024-07-07 RX ADMIN — SODIUM ZIRCONIUM CYCLOSILICATE 10 G: 10 POWDER, FOR SUSPENSION ORAL at 16:06

## 2024-07-07 RX ADMIN — Medication 13 L/MIN: at 18:00

## 2024-07-07 RX ADMIN — LEVETIRACETAM 500 MG: 500 TABLET, FILM COATED ORAL at 09:49

## 2024-07-07 ASSESSMENT — PAIN SCALES - GENERAL
PAINLEVEL_OUTOF10: 0 - NO PAIN

## 2024-07-07 ASSESSMENT — PAIN - FUNCTIONAL ASSESSMENT
PAIN_FUNCTIONAL_ASSESSMENT: 0-10

## 2024-07-07 ASSESSMENT — VISUAL ACUITY: OU: 1

## 2024-07-07 NOTE — CARE PLAN
Problem: Pain - Adult  Goal: Verbalizes/displays adequate comfort level or baseline comfort level  Outcome: Progressing     Problem: Safety - Adult  Goal: Free from fall injury  Outcome: Progressing     Problem: Discharge Planning  Goal: Discharge to home or other facility with appropriate resources  Outcome: Progressing     Problem: Chronic Conditions and Co-morbidities  Goal: Patient's chronic conditions and co-morbidity symptoms are monitored and maintained or improved  Outcome: Progressing     Problem: Skin  Goal: Decreased wound size/increased tissue granulation at next dressing change  Outcome: Progressing  Flowsheets (Taken 7/7/2024 0132)  Decreased wound size/increased tissue granulation at next dressing change:   Promote sleep for wound healing   Protective dressings over bony prominences  Goal: Participates in plan/prevention/treatment measures  Outcome: Progressing  Flowsheets (Taken 7/7/2024 0132)  Participates in plan/prevention/treatment measures:   Discuss with provider PT/OT consult   Elevate heels   Increase activity/out of bed for meals  Goal: Prevent/manage excess moisture  Outcome: Progressing  Flowsheets (Taken 7/7/2024 0132)  Prevent/manage excess moisture:   Cleanse incontinence/protect with barrier cream   Moisturize dry skin   Monitor for/manage infection if present  Goal: Prevent/minimize sheer/friction injuries  Outcome: Progressing  Flowsheets (Taken 7/7/2024 0132)  Prevent/minimize sheer/friction injuries:   Complete micro-shifts as needed if patient unable. Adjust patient position to relieve pressure points, not a full turn   HOB 30 degrees or less   Increase activity/out of bed for meals   Turn/reposition every 2 hours/use positioning/transfer devices   Use pull sheet  Goal: Promote/optimize nutrition  Outcome: Progressing  Flowsheets (Taken 7/7/2024 0132)  Promote/optimize nutrition:   Assist with feeding   Consume > 50% meals/supplements   Monitor/record intake including  meals  Goal: Promote skin healing  Outcome: Progressing  Flowsheets (Taken 7/7/2024 0132)  Promote skin healing:   Protective dressings over bony prominences   Turn/reposition every 2 hours/use positioning/transfer devices       The clinical goals for the shift include Pt will maintain SBP > 100 through shift

## 2024-07-07 NOTE — PROGRESS NOTES
Brief Trauma Update    74F h/o CAD (CABG), pAF (xarelto), COPD, Pulm HTN (on continuous remodulin, 6LNC and with typical O2 sat in 80s) admitted 7/4/24 after ground level fall and found to have TBI, left superior and inferior pubic rami fractures, left sacral fracture, L1 and 2 R TP fractures, and a pelvic hematoma without active extravasation. Her TBI stabilized and NSG signed off with plan for repeat CT head in two weeks, pelvic fractures were deemed non-operative (WBAT, though would consider surgery if she was unable to walk), and pelvic hematoma stable. Pt ultimately transferred to MICU for management of acute on chronic hypoxic respiratory failure in setting of severe pulmonary HTN.     Hypotensive and somnolent overnight and started on levophed. She reports feeling more sleepy today, though alert this morning on my exam. Hb stable, abdomen soft, and no other signs of acute bleeding. Repeat CT head this morning stable. Remains on CTX for UTI. OK for DVT ppx on post-bleed day 2.     Trauma team will sign off but please don't hesitate to call with any questions or concerns.    Juan Barreto MD  Trauma, Critical Care, and Acute Care Surgery  Pager: [phone]

## 2024-07-07 NOTE — PROGRESS NOTES
"Medical Intensive Care - Daily Progress Note   Subjective    Bhargavi Isidro is a 74 y.o. year old female patient with a history of A-fib, CAD s/p CABG, COPD, group I/II/III pulmonary hypertension who originally [place] to [facility] [place] 7[place]4 after a presumed fall where was found down at home. Admitted on 7/4/2024 with following ICU needs: high O2 requirement     Interval History:  Today Bhargavi was seen at the bedside and her mental status was unchanged from the previous day. Overnight she was hypotensive with maps into the 50's. She was started on a levophed drip which was decreased to 0.1 when she was seen today. She endorsed no acute pain or issues but did denote being sleepy. Her wounds are healing well but she does continue to have occasional pain around the sites of bruising 2/2 her mechanical fall.     Meds    Scheduled medications  acetaminophen, 975 mg, oral, TID  azelastine, 1 spray, Each Nostril, BID  cefTRIAXone, 1 g, intravenous, q24h  escitalopram, 10 mg, oral, Daily  lamoTRIgine, 100 mg, oral, Daily  levETIRAcetam, 500 mg, oral, BID  pantoprazole, 40 mg, oral, Daily before breakfast  perflutren protein A microsphere, 0.5 mL, intravenous, Once in imaging  predniSONE, 10 mg, oral, Daily  pregabalin, 75 mg, oral, BID  sildenafil, 20 mg, oral, TID  sodium zirconium cyclosilicate, 10 g, oral, TID  sulfur hexafluoride microsphr, 2 mL, intravenous, Once in imaging      Continuous medications  norepinephrine, 0-0.2 mcg/kg/min, Last Rate: 0.06 mcg/kg/min (07/07/24 0550)  treprostinil, 39 ng/kg/min (Order-Specific), Last Rate: 39 ng/kg/min (07/07/24 0716)      PRN medications  PRN medications: HYDROmorphone, methocarbamol, ondansetron **OR** ondansetron, [Held by provider] oxyCODONE, [Held by provider] oxyCODONE, oxygen, polyethylene glycol     Objective    Blood pressure (!) 106/46, pulse 70, temperature 36.1 °C (97 °F), temperature source Temporal, resp. rate 14, height 1.651 m (5' 5\"), weight 72.2 kg (159 lb 2.8 " oz), SpO2 92%.     Physical Exam  Vitals and nursing note reviewed.   Constitutional:       General: She is sleeping. She is not in acute distress.     Appearance: She is overweight.   HENT:      Head: Normocephalic and atraumatic.   Eyes:      General: Lids are normal. Vision grossly intact.      Extraocular Movements: Extraocular movements intact.   Cardiovascular:      Rate and Rhythm: Normal rate and regular rhythm.   Pulmonary:      Effort: Pulmonary effort is normal.      Breath sounds: Normal breath sounds.   Abdominal:      General: Bowel sounds are normal. There is no distension. There are no signs of injury.      Palpations: Abdomen is soft.      Tenderness: There is abdominal tenderness in the right lower quadrant.   Musculoskeletal:      Right lower leg: No edema.      Left lower leg: No edema.   Skin:     General: Skin is warm and dry.   Neurological:      Mental Status: She is oriented to person, place, and time. She is lethargic.      Cranial Nerves: Cranial nerves 2-12 are intact.      Motor: Tremor present.      Deep Tendon Reflexes: Babinski sign absent on the right side. Babinski sign absent on the left side.      Reflex Scores:       Brachioradialis reflexes are 2+ on the right side.       Achilles reflexes are 1+ on the right side and 1+ on the left side.     Comments: Tremor historical   Brachioradialis L arm not done due to Iv placement             Intake/Output Summary (Last 24 hours) at 7/7/2024 0748  Last data filed at 7/7/2024 0600  Gross per 24 hour   Intake 646.43 ml   Output 540 ml   Net 106.43 ml     Labs:   Results from last 72 hours   Lab Units 07/07/24  0513 07/06/24  2101 07/06/24  1515   SODIUM mmol/L 128* 131* 129*   POTASSIUM mmol/L 4.8 5.0 4.8   CHLORIDE mmol/L 97* 98 97*   CO2 mmol/L 19* 20* 19*   BUN mg/dL 43* 40* 39*   CREATININE mg/dL 3.46* 3.27* 3.15*   GLUCOSE mg/dL 118* 103* 151*   CALCIUM mg/dL 7.9* 8.1* 8.1*   ANION GAP mmol/L 17 18 18   EGFR mL/min/1.73m*2 13* 14* 15*    PHOSPHORUS mg/dL 7.0* 6.8* 6.3*      Results from last 72 hours   Lab Units 07/07/24  0513 07/06/24  0324 07/05/24  2100 07/04/24  1818 07/04/24  0958   WBC AUTO x10*3/uL 15.0* 14.4* 16.8*   < > 22.0*   HEMOGLOBIN g/dL 8.9* 9.2* 9.6*   < > 10.3*   HEMATOCRIT % 29.1* 30.7* 30.6*   < > 32.8*   PLATELETS AUTO x10*3/uL 149* 140* 147*   < > 229   NEUTROS PCT AUTO %  --  92.5 90.7  --  86.0   LYMPHS PCT AUTO %  --  3.3 3.2  --  6.1   MONOS PCT AUTO %  --  2.7 4.2  --  5.9   EOS PCT AUTO %  --  0.0 0.2  --  0.5    < > = values in this interval not displayed.        Micro/ID:     Lab Results   Component Value Date    URINECULTURE No growth 07/05/2024       Summary of key imaging results from the last 24 hours  CT HEAD WO IV CONTRAST; 7/6/2024 3:12 am   IMPRESSION:  No significant change in size of subdural hematoma overlying the posterior right cerebral convexity again measuring up to 1.1 cm in maximal thickness. The blood products are slightly less dense in several locations within the hematoma indicative of aging blood products.  Interpreted By: Jairo Toro,     MANDO CHEST 1 VIEW; 7/5/2024 5:33 pm   1.  Interval improvement in pulmonary edema.  2. Cardiomegaly versus pericardial effusion.  Interpreted By: David Jiang  Assessment and Plan     Assessment: Bhargavi Isidro is a 74 y.o. year old female patient admitted on 7/4/2024 with acute on chronic hypoxic respiratory failure. She has continued to have an altered mental status and complains of being tired. Although she normally is slightly hypotensive she has had episodes of hypotension overnight which have been concerning. Etiology of AMS is currently unknown could be pharmaceutical, infectious, or secondary to her previously noted SDH.     Mechanical Ventilation: none  Sedation/Analgesia:  none  Restraints: no    Summary for 07/07/24  :  Repeat Head CT showed no significant changes in SDH size   Started on pressors overnight for hypotension transitioning from  levophed to dopamine   Discontinued sildenafil 2/2 hypotension  IV Remodeling dosing switched to dosing based on her home weight  Ordered full infectious workup to further evaluate mental status.      Plan:  NEUROLOGY/PSYCH:     Right sided subdural hematoma  ::SDH seen on CT scan following mechanical fall in the setting of Eliquis use for A-fib  ::Neurosurgery consulted, no operative management  ::S/p reversal with Andexxa  Management:  -Neurosurgery following, appreciate recommendations  -Repeat CT Head w/o contrast showed no significant changes in the size of the subdural hematoma  - No improvement in her mental status   -Neuro checks q4  -Repeat head CT due to mental status  -Holding Dvt Ppx until mental status improves.      #Major depression  #Generalized anxiety disorder  Management:  -Holding Buspar in setting of renal failure  -d/c Lexapro (esciatalopram) 10mg  -C/w lamotrigine 100mg daily  -C/w Xanax 0.5mg daily PRN for anxiety     CARDIOVASCULAR:  #Heart failure with preserved ejection fraction  #Right sided heart failure  ::TTE (6/3/2024): LVEF of 55-60%, dilated RV with low normal RV systolic function and severe tricuspid regurgiation  ::TTE (04Jul24): LVEF of 73%, with LV remodeling, Severely enlarged RV, Severely dilated RA, Severe Tricuspid regurgitation   ::RV failure likely secondary to underlying pulmonary hypertension  ::Patient has received continuous drips of LR and D10W and has received intermittent boluses since being admitted  ::Home torsemide restarted but urine output has still been low  Management:  -Holding fluids  -Lasix 80mg IV given overnight, followed by Lasix 120 mg  will approximately      #Atrial fibrillation s/p ablation and PPM  ::CHADS-VASC of 4  ::S/p AV node ablation and PPM insertion  ::On Eliquis previously  Management:  -Eliquis held in setting of intracranial bleed. S/p Andexxa. Per Neurosurgery can restart on day 14 post-fall  -Maintained on telemetry, not on  rate-control currently  -Current BP goal MAP of 65 or greater  - on 0.1 of levophed sit start dopamine and wean off levophed     #CAD s/p CABG  ::CABG done about 10 years ago  ::No heart cath data in her chart  Management:  -Not on statin at home  -Per Neurosurgery must wait until post-fall day 7 before starting ASA     PULMONARY:  #Pulmonary hypertension  #Right sided heart failure  ::Has been thought to have group I, II, and III pulmonary hypertension at various times  ::Most recent RHC (10/2022) significant for RAP 20 PAP 92/45 mPAP 61 PCWP 26 CO/CI (TD) 3.1/1.74 PVR 11.9 PaSat 57. Both pre and post-capillary hypertension  ::RV dilation in TTE as above  ::Patient prescribed 5-6LNC at rest, 10-15LNC on exertion but patient apparently only uses 6LNC and tolerates saturations in the 70s  ::Patient has been on remodulin for years, refuses to use anything besides her own dilutions in her own cassette  Management:  -Holding fluids, continuing to diurese with lasix   -d/c sildenafil 20mg TID secondary to hypotension  -Remodulin dosing modified to remodulin 39ng/kg/min-no more titration-will refer for premixed cassettes   -C/w prednisone 10mg daily. This is a chronic medication for previously suspected organizing pneumonia     RENAL/GENITOURINARY:  #Acute on chronic kidney injury, stage I, oliguric  ::Baseline creatinine of around 1.4-1.5  ::FENa 0.4% indicating prerenal DEBORA  ::Renal injury possibly secondary to cardiorenal syndrome as it has worsened despite fluid administration  ::Nephrotoxic medications include Protonix  Management:  -Renal U/S results indicative of chronic kidney disease no acute findings  -Continue to monitor daily RFPs  -Abbott in place, strict I/O  -Avoid further nephrotoxic medications  -Nephrology consulted appreciate rec's   -FeUa ordered due to Lasix use     #Non-anion gap metabolic acidosis  #Hyperkalemia  ::Likely secondary to acute renal failure which may be secondary to cardiorenal syndrome  as above  ::Potassium of 5.8 on repeat labs  Management:  -Diuresis as above  -EKG with no peaked T waves or interval prolongation from baseline  -Lokelma held pending results of next RFP     #Hyponatremia  ::Likely hypervolemic in etiology  Management:  -Consult placed to Nephro appreciate rec's   -will evaluate volume status via echo  -Continue daily RFPs     GASTROENTEROLOGY:  No acute concerns     INFECTIOUS DISEASE:  #Urinary tract infection  ::No symptoms of dysuria but symptoms of reduced concentration and fatigue  ::UA showing 11-20 WBC, 25 LE  -Ceftriaxone 1g daily (7/6-*)  - Xray chest ordered today (07Jul24)   - blood cultures drawn today (07Jul24)   - Urine cultures drawn today (07Jul24)      HEMATOLOGY:  No acute concerns     MUSCULOSKELETAL/ SKIN:  #Left inferior pelvic ramus fracture  #Pelvic hematoma  ::Occurred after fall  ::No operative management per Ortho, no need for IR embolization  Management:  -PT/OT consult  -Per Ortho, if she fails trial of ambulation she may be a candidate for operative intervention for pelvic pain relief  -Pain regimen: Tylenol 975mg TID, PRN Dilaudid 0.4mg q3h PRN, Lyrica 75mg BID, Robadin 500mg BID PRN    ICU Check List       FEN  Fluids: Hold  Electrolytes: none  Nutrition: Regular  Prophylaxis:  DVT ppx: Held  GI ppx: Protonix  Bowel care: Miralax PRN, Lokelma  Hardware:  Catheter: Saunders  Access: PIV  Drains: Abbott  Lines: None  Social:  Code: DO NOT INTUBATE  HPOA: Jaleesa Rondon (friend) [phone]   Disposition: ICU while weaning down on oxygen    Herman Ruggiero Md  07/07/24 at 7:48 AM     Disclaimer: Documentation completed with the information available at the time of input. The times in the chart may not be reflective of actual patient care times, interventions, or procedures. Documentation occurs after the physical care of the patient.

## 2024-07-07 NOTE — PROGRESS NOTES
NEPHROLOGY FOLLOW UP NOTE    Bhargavi K Sanna   74 y.o.    @WT@  MRN/Room: [id number]/25/25-[id number]    Subjective: Pt resting comfortably in bed. On 8L high flow NC. On levophed drip which was started overnight.     Objective:     Meds:   acetaminophen, 975 mg, TID  azelastine, 1 spray, BID  cefTRIAXone, 1 g, q24h  escitalopram, 10 mg, Daily  lamoTRIgine, 100 mg, Daily  levETIRAcetam, 500 mg, BID  pantoprazole, 40 mg, Daily before breakfast  perflutren protein A microsphere, 0.5 mL, Once in imaging  predniSONE, 10 mg, Daily  pregabalin, 75 mg, BID  sildenafil, 20 mg, TID  sodium zirconium cyclosilicate, 10 g, TID  sulfur hexafluoride microsphr, 2 mL, Once in imaging      norepinephrine, Last Rate: 0.06 mcg/kg/min (07/07/24 0550)  treprostinil, Last Rate: 39 ng/kg/min (07/07/24 0716)      HYDROmorphone, 0.4 mg, q3h PRN  methocarbamol, 500 mg, BID PRN  ondansetron, 4 mg, q8h PRN   Or  ondansetron, 4 mg, q8h PRN  [Held by provider] oxyCODONE, 2.5 mg, q4h PRN  [Held by provider] oxyCODONE, 5 mg, q4h PRN  oxygen, , Continuous PRN - O2/gases  polyethylene glycol, 17 g, Daily PRN        Vitals:    07/07/24 0900   BP:    Pulse:    Resp:    Temp: 37 °C (98.6 °F)   SpO2:           Intake/Output Summary (Last 24 hours) at 7/7/2024 1049  Last data filed at 7/7/2024 0600  Gross per 24 hour   Intake 646.43 ml   Output 340 ml   Net 306.43 ml       General appearance: AAOx3. No distress. On 8L high flow NC  Eyes: non-icteric  Skin: left-sided facial and periorbital ecchymosis  Heart: rhythm regular. no rub  Lungs: CTA bilat.  no wheezing/crackles  Abdomen: soft, nt/nd  Extremities: 2+ edema bilat  :  Abbott  Neuro: No FND,  asterixis   ACCESS: right CVC, pIV    Blood Labs:  Results for orders placed or performed during the hospital encounter of 07/04/24 (from the past 24 hour(s))   Drug Screen, Urine   Result Value Ref Range    Amphetamine Screen, Urine Presumptive Negative Presumptive Negative    Barbiturate Screen, Urine Presumptive  Negative Presumptive Negative    Benzodiazepines Screen, Urine Presumptive Negative Presumptive Negative    Cannabinoid Screen, Urine Presumptive Negative Presumptive Negative    Cocaine Metabolite Screen, Urine Presumptive Negative Presumptive Negative    Fentanyl Screen, Urine Presumptive Negative Presumptive Negative    Opiate Screen, Urine Presumptive Negative Presumptive Negative    Oxycodone Screen, Urine Presumptive Positive (A) Presumptive Negative    PCP Screen, Urine Presumptive Negative Presumptive Negative    Methadone Screen, Urine Presumptive Negative Presumptive Negative   Creatine Kinase   Result Value Ref Range    Creatine Kinase 17 0 - 215 U/L   Urine electrolytes   Result Value Ref Range    Sodium, Urine Random 26 mmol/L    Sodium/Creatinine Ratio 49 Not established. mmol/g Creat    Potassium, Urine Random 54 mmol/L    Potassium/Creatinine Ratio 101 Not established mmol/g Creat    Chloride, Urine Random 70 mmol/L    Chloride/Creatinine Ratio 131 38 - 318 mmol/g creat    Creatinine, Urine Random 53.3 20.0 - 320.0 mg/dL   POCT GLUCOSE   Result Value Ref Range    POCT Glucose 113 (H) 74 - 99 mg/dL   Renal function panel   Result Value Ref Range    Glucose 151 (H) 74 - 99 mg/dL    Sodium 129 (L) 136 - 145 mmol/L    Potassium 4.8 3.5 - 5.3 mmol/L    Chloride 97 (L) 98 - 107 mmol/L    Bicarbonate 19 (L) 21 - 32 mmol/L    Anion Gap 18 10 - 20 mmol/L    Urea Nitrogen 39 (H) 6 - 23 mg/dL    Creatinine 3.15 (H) 0.50 - 1.05 mg/dL    eGFR 15 (L) >60 mL/min/1.73m*2    Calcium 8.1 (L) 8.6 - 10.6 mg/dL    Phosphorus 6.3 (H) 2.5 - 4.9 mg/dL    Albumin 2.8 (L) 3.4 - 5.0 g/dL   Transthoracic Echo (TTE) Complete   Result Value Ref Range    AV pk junie 1.57 m/s    LVOT diam 1.90 cm    MV avg E/e' ratio 12.44     MV E/A ratio 2.89     Tricuspid annular plane systolic excursion 1.1 cm    LV EF 73 %    RV free wall pk S' 9.90 cm/s    LVIDd 4.20 cm    RVSP 90.3 mmHg    Aortic Valve Area by Continuity of Peak Velocity 2.26  cm2    AV pk grad 9.9 mmHg    LV A4C EF 70.8    POCT GLUCOSE   Result Value Ref Range    POCT Glucose 152 (H) 74 - 99 mg/dL   POCT GLUCOSE   Result Value Ref Range    POCT Glucose 121 (H) 74 - 99 mg/dL   Renal function panel   Result Value Ref Range    Glucose 103 (H) 74 - 99 mg/dL    Sodium 131 (L) 136 - 145 mmol/L    Potassium 5.0 3.5 - 5.3 mmol/L    Chloride 98 98 - 107 mmol/L    Bicarbonate 20 (L) 21 - 32 mmol/L    Anion Gap 18 10 - 20 mmol/L    Urea Nitrogen 40 (H) 6 - 23 mg/dL    Creatinine 3.27 (H) 0.50 - 1.05 mg/dL    eGFR 14 (L) >60 mL/min/1.73m*2    Calcium 8.1 (L) 8.6 - 10.6 mg/dL    Phosphorus 6.8 (H) 2.5 - 4.9 mg/dL    Albumin 2.9 (L) 3.4 - 5.0 g/dL   Magnesium   Result Value Ref Range    Magnesium 2.20 1.60 - 2.40 mg/dL   Renal function panel   Result Value Ref Range    Glucose 118 (H) 74 - 99 mg/dL    Sodium 128 (L) 136 - 145 mmol/L    Potassium 4.8 3.5 - 5.3 mmol/L    Chloride 97 (L) 98 - 107 mmol/L    Bicarbonate 19 (L) 21 - 32 mmol/L    Anion Gap 17 10 - 20 mmol/L    Urea Nitrogen 43 (H) 6 - 23 mg/dL    Creatinine 3.46 (H) 0.50 - 1.05 mg/dL    eGFR 13 (L) >60 mL/min/1.73m*2    Calcium 7.9 (L) 8.6 - 10.6 mg/dL    Phosphorus 7.0 (H) 2.5 - 4.9 mg/dL    Albumin 2.9 (L) 3.4 - 5.0 g/dL   CBC   Result Value Ref Range    WBC 15.0 (H) 4.4 - 11.3 x10*3/uL    nRBC 0.3 (H) 0.0 - 0.0 /100 WBCs    RBC 3.75 (L) 4.00 - 5.20 x10*6/uL    Hemoglobin 8.9 (L) 12.0 - 16.0 g/dL    Hematocrit 29.1 (L) 36.0 - 46.0 %    MCV 78 (L) 80 - 100 fL    MCH 23.7 (L) 26.0 - 34.0 pg    MCHC 30.6 (L) 32.0 - 36.0 g/dL    RDW 21.9 (H) 11.5 - 14.5 %    Platelets 149 (L) 150 - 450 x10*3/uL   POCT GLUCOSE   Result Value Ref Range    POCT Glucose 100 (H) 74 - 99 mg/dL          ASSESSMENT:  Bhargavi Isidro is a 74 y.o. female with a past medical hx of A fib (on eliquis), group I/II/III pulm HTN (on remodulin and sildenafil), COPD, chronic hypoxic resp failure (5-6L at rest, 10L home O2 baseline), LEONEL, HFpEF heart block s/p pacemaker, CAD s/p  CABG who is admitted for a right-sided SDH and left pubic rami fracture c/b hematoma formation following a mechanical fall. S/p anticoag reversal with Andexxa. Pt is stable without neurosurgical or orthopedic intervention, now transferred to the MICU with high oxygen requirements. Nephrology consulted for acute kidney injury and hyponatremia. Ddx of DEBORA includes cardiorenal syndrome i/s/o pHTN and right-sided heart failure and ischemic ATN given hypotension.     Updates 7/7:  - Worsening hyponatremia and Cr; however this may reflect progression of original insult  - Pt started on levophed overnight with SBP > or close to 100    #DEBORA, Stage I on CKD III, oliguric  - Baseline creatinine: 1.4-1.5  - Etiology: ischemic DEBORA I/s/o persistent hypotension  - 690 mL  urine output last 24hrs, given 80 mg lasix at noon yesterday  - UA showed:  2+ blood, leuk esterase, WBC's  - Urine electrolyes showed FeNA/FeUrea of 0.5% consistent prerenal. Urine electrolytes 7/6: Cortney 26, Uk 54, Ucr 53.3  - Clinical volume status: hypervolemic  - Electrolytes: hyponatremia, hyperkalemia, hypocalcemia  - Acid base status: Non-anion gap metabolic acidosis     Hemodynamics  - Baseline blood pressure at /55-65. Pt started on Levophed yesterday.       Recommendations:  - Would limit IV fluids  - Diurese prn  - Maintain SBP > 100 as feasible  - Can DC lokelma  - Will continue to follow  - strict Is/Os  - Avoid hypotension/rapid fluctuations in BPs    Obie Varela MD  Nephrology Resident  24 hour Renal Pager - [phone]    Discussed with attending nephrologist, Dr. Bhardwaj

## 2024-07-08 LAB
ALBUMIN SERPL BCP-MCNC: 2.7 G/DL (ref 3.4–5)
ALBUMIN SERPL BCP-MCNC: 3 G/DL (ref 3.4–5)
ANION GAP SERPL CALC-SCNC: 17 MMOL/L (ref 10–20)
ANION GAP SERPL CALC-SCNC: 19 MMOL/L (ref 10–20)
ATRIAL RATE: 441 BPM
BUN SERPL-MCNC: 52 MG/DL (ref 6–23)
BUN SERPL-MCNC: 52 MG/DL (ref 6–23)
CALCIUM SERPL-MCNC: 7.3 MG/DL (ref 8.6–10.6)
CALCIUM SERPL-MCNC: 8 MG/DL (ref 8.6–10.6)
CHLORIDE SERPL-SCNC: 97 MMOL/L (ref 98–107)
CHLORIDE SERPL-SCNC: 98 MMOL/L (ref 98–107)
CO2 SERPL-SCNC: 20 MMOL/L (ref 21–32)
CO2 SERPL-SCNC: 22 MMOL/L (ref 21–32)
CREAT SERPL-MCNC: 3.62 MG/DL (ref 0.5–1.05)
CREAT SERPL-MCNC: 3.62 MG/DL (ref 0.5–1.05)
EGFRCR SERPLBLD CKD-EPI 2021: 13 ML/MIN/1.73M*2
EGFRCR SERPLBLD CKD-EPI 2021: 13 ML/MIN/1.73M*2
ERYTHROCYTE [DISTWIDTH] IN BLOOD BY AUTOMATED COUNT: 21.5 % (ref 11.5–14.5)
GLUCOSE BLD MANUAL STRIP-MCNC: 135 MG/DL (ref 74–99)
GLUCOSE BLD MANUAL STRIP-MCNC: 136 MG/DL (ref 74–99)
GLUCOSE BLD MANUAL STRIP-MCNC: 142 MG/DL (ref 74–99)
GLUCOSE BLD MANUAL STRIP-MCNC: 94 MG/DL (ref 74–99)
GLUCOSE SERPL-MCNC: 118 MG/DL (ref 74–99)
GLUCOSE SERPL-MCNC: 94 MG/DL (ref 74–99)
HCT VFR BLD AUTO: 26.9 % (ref 36–46)
HGB BLD-MCNC: 8.4 G/DL (ref 12–16)
MAGNESIUM SERPL-MCNC: 2.35 MG/DL (ref 1.6–2.4)
MCH RBC QN AUTO: 24.4 PG (ref 26–34)
MCHC RBC AUTO-ENTMCNC: 31.2 G/DL (ref 32–36)
MCV RBC AUTO: 78 FL (ref 80–100)
NRBC BLD-RTO: 0.2 /100 WBCS (ref 0–0)
PHOSPHATE SERPL-MCNC: 7.7 MG/DL (ref 2.5–4.9)
PHOSPHATE SERPL-MCNC: 8.2 MG/DL (ref 2.5–4.9)
PLATELET # BLD AUTO: 131 X10*3/UL (ref 150–450)
POTASSIUM SERPL-SCNC: 3.6 MMOL/L (ref 3.5–5.3)
POTASSIUM SERPL-SCNC: 4.1 MMOL/L (ref 3.5–5.3)
Q ONSET: 228 MS
QRS COUNT: 12 BEATS
QRS DURATION: 92 MS
QT INTERVAL: 448 MS
QTC CALCULATION(BAZETT): 483 MS
QTC FREDERICIA: 471 MS
R AXIS: -45 DEGREES
RBC # BLD AUTO: 3.44 X10*6/UL (ref 4–5.2)
SODIUM SERPL-SCNC: 132 MMOL/L (ref 136–145)
SODIUM SERPL-SCNC: 133 MMOL/L (ref 136–145)
T AXIS: 114 DEGREES
T OFFSET: 452 MS
VENTRICULAR RATE: 70 BPM
WBC # BLD AUTO: 11.2 X10*3/UL (ref 4.4–11.3)

## 2024-07-08 PROCEDURE — 2500000004 HC RX 250 GENERAL PHARMACY W/ HCPCS (ALT 636 FOR OP/ED)

## 2024-07-08 PROCEDURE — 37799 UNLISTED PX VASCULAR SURGERY: CPT

## 2024-07-08 PROCEDURE — 82947 ASSAY GLUCOSE BLOOD QUANT: CPT

## 2024-07-08 PROCEDURE — 97530 THERAPEUTIC ACTIVITIES: CPT | Mod: GP

## 2024-07-08 PROCEDURE — 83735 ASSAY OF MAGNESIUM: CPT

## 2024-07-08 PROCEDURE — 2020000001 HC ICU ROOM DAILY

## 2024-07-08 PROCEDURE — 2500000001 HC RX 250 WO HCPCS SELF ADMINISTERED DRUGS (ALT 637 FOR MEDICARE OP)

## 2024-07-08 PROCEDURE — 85027 COMPLETE CBC AUTOMATED: CPT

## 2024-07-08 PROCEDURE — 97110 THERAPEUTIC EXERCISES: CPT | Mod: GP

## 2024-07-08 PROCEDURE — 99233 SBSQ HOSP IP/OBS HIGH 50: CPT

## 2024-07-08 PROCEDURE — 80069 RENAL FUNCTION PANEL: CPT

## 2024-07-08 PROCEDURE — 2500000002 HC RX 250 W HCPCS SELF ADMINISTERED DRUGS (ALT 637 FOR MEDICARE OP, ALT 636 FOR OP/ED)

## 2024-07-08 RX ORDER — SILDENAFIL CITRATE 20 MG/1
20 TABLET ORAL 2 TIMES DAILY
Status: DISCONTINUED | OUTPATIENT
Start: 2024-07-08 | End: 2024-07-17 | Stop reason: HOSPADM

## 2024-07-08 RX ORDER — HEPARIN SODIUM 5000 [USP'U]/ML
5000 INJECTION, SOLUTION INTRAVENOUS; SUBCUTANEOUS EVERY 8 HOURS
Status: DISCONTINUED | OUTPATIENT
Start: 2024-07-08 | End: 2024-07-17 | Stop reason: HOSPADM

## 2024-07-08 RX ADMIN — HEPARIN SODIUM 5000 UNITS: 5000 INJECTION INTRAVENOUS; SUBCUTANEOUS at 21:06

## 2024-07-08 RX ADMIN — DOPAMINE HYDROCHLORIDE 5 MCG/KG/MIN: 160 INJECTION, SOLUTION INTRAVENOUS at 07:48

## 2024-07-08 RX ADMIN — ACETAMINOPHEN 975 MG: 325 TABLET ORAL at 15:35

## 2024-07-08 RX ADMIN — AZELASTINE HYDROCHLORIDE 1 SPRAY: 137 SPRAY, METERED NASAL at 08:27

## 2024-07-08 RX ADMIN — PREGABALIN 75 MG: 25 CAPSULE ORAL at 21:05

## 2024-07-08 RX ADMIN — LAMOTRIGINE 100 MG: 100 TABLET ORAL at 08:27

## 2024-07-08 RX ADMIN — ACETAMINOPHEN 975 MG: 325 TABLET ORAL at 08:27

## 2024-07-08 RX ADMIN — ESCITALOPRAM 10 MG: 10 TABLET, FILM COATED ORAL at 08:27

## 2024-07-08 RX ADMIN — FUROSEMIDE 10 MG/HR: 10 INJECTION, SOLUTION INTRAMUSCULAR; INTRAVENOUS at 13:00

## 2024-07-08 RX ADMIN — SILDENAFIL 20 MG: 20 TABLET ORAL at 21:05

## 2024-07-08 RX ADMIN — TREPROSTINIL 39 NG/KG/MIN: 5 INJECTION, SOLUTION INTRAVENOUS; SUBCUTANEOUS at 15:35

## 2024-07-08 RX ADMIN — PREGABALIN 75 MG: 25 CAPSULE ORAL at 08:27

## 2024-07-08 RX ADMIN — LEVETIRACETAM 500 MG: 500 TABLET, FILM COATED ORAL at 08:27

## 2024-07-08 RX ADMIN — LEVETIRACETAM 500 MG: 500 TABLET, FILM COATED ORAL at 21:05

## 2024-07-08 RX ADMIN — FUROSEMIDE 120 MG: 10 INJECTION, SOLUTION INTRAMUSCULAR; INTRAVENOUS at 00:21

## 2024-07-08 RX ADMIN — AZELASTINE HYDROCHLORIDE 1 SPRAY: 137 SPRAY, METERED NASAL at 21:05

## 2024-07-08 RX ADMIN — CEFTRIAXONE SODIUM 1 G: 1 INJECTION, SOLUTION INTRAVENOUS at 00:22

## 2024-07-08 RX ADMIN — HEPARIN SODIUM 5000 UNITS: 5000 INJECTION INTRAVENOUS; SUBCUTANEOUS at 13:00

## 2024-07-08 RX ADMIN — PANTOPRAZOLE SODIUM 40 MG: 40 TABLET, DELAYED RELEASE ORAL at 08:26

## 2024-07-08 RX ADMIN — PREDNISONE 10 MG: 20 TABLET ORAL at 08:27

## 2024-07-08 RX ADMIN — ACETAMINOPHEN 975 MG: 325 TABLET ORAL at 21:05

## 2024-07-08 ASSESSMENT — COGNITIVE AND FUNCTIONAL STATUS - GENERAL
CLIMB 3 TO 5 STEPS WITH RAILING: TOTAL
MOVING FROM LYING ON BACK TO SITTING ON SIDE OF FLAT BED WITH BEDRAILS: TOTAL
STANDING UP FROM CHAIR USING ARMS: A LOT
TURNING FROM BACK TO SIDE WHILE IN FLAT BAD: TOTAL
MOBILITY SCORE: 7
MOVING TO AND FROM BED TO CHAIR: TOTAL
WALKING IN HOSPITAL ROOM: TOTAL

## 2024-07-08 ASSESSMENT — PAIN - FUNCTIONAL ASSESSMENT
PAIN_FUNCTIONAL_ASSESSMENT: 0-10

## 2024-07-08 ASSESSMENT — PAIN SCALES - GENERAL
PAINLEVEL_OUTOF10: 0 - NO PAIN

## 2024-07-08 ASSESSMENT — VISUAL ACUITY: OU: 1

## 2024-07-08 NOTE — CARE PLAN
Problem: Safety - Adult  Goal: Free from fall injury  Outcome: Progressing  Flowsheets (Taken 7/8/2024 1315)  Free from fall injury: Instruct family/caregiver on patient safety     Problem: Chronic Conditions and Co-morbidities  Goal: Patient's chronic conditions and co-morbidity symptoms are monitored and maintained or improved  Flowsheets (Taken 7/8/2024 1315)  Care Plan - Patient's Chronic Conditions and Co-Morbidity Symptoms are Monitored and Maintained or Improved:   Monitor and assess patient's chronic conditions and comorbid symptoms for stability, deterioration, or improvement   Collaborate with multidisciplinary team to address chronic and comorbid conditions and prevent exacerbation or deterioration   Update acute care plan with appropriate goals if chronic or comorbid symptoms are exacerbated and prevent overall improvement and discharge     Problem: Skin  Goal: Decreased wound size/increased tissue granulation at next dressing change  Outcome: Progressing  Flowsheets (Taken 7/8/2024 1315)  Decreased wound size/increased tissue granulation at next dressing change:   Promote sleep for wound healing   Protective dressings over bony prominences  Goal: Participates in plan/prevention/treatment measures  Outcome: Progressing  Flowsheets (Taken 7/8/2024 1315)  Participates in plan/prevention/treatment measures:   Discuss with provider PT/OT consult   Elevate heels  Goal: Prevent/manage excess moisture  Outcome: Progressing  Flowsheets (Taken 7/8/2024 1315)  Prevent/manage excess moisture:   Cleanse incontinence/protect with barrier cream   Follow provider orders for dressing changes   Moisturize dry skin  Goal: Prevent/minimize sheer/friction injuries  Outcome: Progressing  Flowsheets (Taken 7/8/2024 1315)  Prevent/minimize sheer/friction injuries:   Complete micro-shifts as needed if patient unable. Adjust patient position to relieve pressure points, not a full turn   Use pull sheet   HOB 30 degrees or  less   Turn/reposition every 2 hours/use positioning/transfer devices  Goal: Promote/optimize nutrition  Outcome: Progressing  Flowsheets (Taken 7/8/2024 1315)  Promote/optimize nutrition:   Assist with feeding   Monitor/record intake including meals   Consume > 50% meals/supplements   Offer water/supplements/favorite foods  Goal: Promote skin healing  Outcome: Progressing  Flowsheets (Taken 7/8/2024 1315)  Promote skin healing:   Assess skin/pad under line(s)/device(s)   Protective dressings over bony prominences   Turn/reposition every 2 hours/use positioning/transfer devices

## 2024-07-08 NOTE — PROGRESS NOTES
NEPHROLOGY FOLLOW UP NOTE    Bhargavi Isidro   74 y.o.    @WT@  MRN/Room: [id number]/25/25-A    Subjective: Pt resting comfortably in bed. On 8L high flow NC. On dopamine drip.  Pt is lethargic today but still responsive to questions.    Objective:     Meds:   acetaminophen, 975 mg, TID  azelastine, 1 spray, BID  cefTRIAXone, 1 g, q24h  escitalopram, 10 mg, Daily  lamoTRIgine, 100 mg, Daily  levETIRAcetam, 500 mg, BID  pantoprazole, 40 mg, Daily before breakfast  perflutren protein A microsphere, 0.5 mL, Once in imaging  predniSONE, 10 mg, Daily  pregabalin, 75 mg, BID  [Held by provider] sildenafil, 20 mg, TID  sulfur hexafluoride microsphr, 2 mL, Once in imaging      DOPamine, Last Rate: 5 mcg/kg/min (07/08/24 0900)  norepinephrine, Last Rate: Stopped (07/07/24 1610)  treprostinil, Last Rate: 39 ng/kg/min (07/08/24 0900)      HYDROmorphone, 0.4 mg, q3h PRN  methocarbamol, 500 mg, BID PRN  ondansetron, 4 mg, q8h PRN   Or  ondansetron, 4 mg, q8h PRN  [Held by provider] oxyCODONE, 2.5 mg, q4h PRN  [Held by provider] oxyCODONE, 5 mg, q4h PRN  oxygen, , Continuous PRN - O2/gases  polyethylene glycol, 17 g, Daily PRN        Vitals:    07/08/24 0900   BP: (!) 105/44   Pulse: 70   Resp: 16   Temp: 36.4 °C (97.5 °F)   SpO2: 91%          Intake/Output Summary (Last 24 hours) at 7/8/2024 1054  Last data filed at 7/8/2024 0900  Gross per 24 hour   Intake 685.65 ml   Output 1100 ml   Net -414.35 ml       General appearance: AAOx3. No distress. On 8L high flow NC  Eyes: non-icteric  Skin: left-sided facial and periorbital ecchymosis. Diffuse bruising of left shoulder, bilateral lower legs  Heart: rhythm regular. no murmur  Lungs: CTA bilat.  no wheezing/crackles  Abdomen: soft, nt/nd  Extremities: 2+ edema bilat  :  Abbott  Neuro: No FND,  asterixis. Somnolent and lethargic but easily aroused  ACCESS: right CVC, pIV    Blood Labs:  Results for orders placed or performed during the hospital encounter of 07/04/24 (from the past 24  hour(s))   Blood Gas Arterial Full Panel   Result Value Ref Range    POCT pH, Arterial 7.32 (L) 7.38 - 7.42 pH    POCT pCO2, Arterial 37 (L) 38 - 42 mm Hg    POCT pO2, Arterial 79 (L) 85 - 95 mm Hg    POCT SO2, Arterial 97 94 - 100 %    POCT Oxy Hemoglobin, Arterial 94.5 94.0 - 98.0 %    POCT Hematocrit Calculated, Arterial 29.0 (L) 36.0 - 46.0 %    POCT Sodium, Arterial 124 (L) 136 - 145 mmol/L    POCT Potassium, Arterial 4.5 3.5 - 5.3 mmol/L    POCT Chloride, Arterial 98 98 - 107 mmol/L    POCT Ionized Calcium, Arterial 1.09 (L) 1.10 - 1.33 mmol/L    POCT Glucose, Arterial 115 (H) 74 - 99 mg/dL    POCT Lactate, Arterial 0.6 0.4 - 2.0 mmol/L    POCT Base Excess, Arterial -6.4 (L) -2.0 - 3.0 mmol/L    POCT HCO3 Calculated, Arterial 19.1 (L) 22.0 - 26.0 mmol/L    POCT Hemoglobin, Arterial 9.5 (L) 12.0 - 16.0 g/dL    POCT Anion Gap, Arterial 11 10 - 25 mmo/L    Patient Temperature 37.0 degrees Celsius    FiO2 60 %   Troponin I, High Sensitivity   Result Value Ref Range    Troponin I, High Sensitivity (CMC) 11 0 - 34 ng/L   Blood Culture    Specimen: Peripheral Venipuncture; Blood culture   Result Value Ref Range    Blood Culture Loaded on Instrument - Culture in progress    POCT GLUCOSE   Result Value Ref Range    POCT Glucose 128 (H) 74 - 99 mg/dL   SST TOP   Result Value Ref Range    Extra Tube Hold for add-ons.    Urea Nitrogen, Urine Random   Result Value Ref Range    Urea Nitrogen, Urine Random 264 mg/dL    Creatinine, Urine Random 77.3 20.0 - 320.0 mg/dL    Urea Nitrogen/Creatinine Ratio 3.4 Not established. g/g creat   POCT GLUCOSE   Result Value Ref Range    POCT Glucose 120 (H) 74 - 99 mg/dL   Magnesium   Result Value Ref Range    Magnesium 2.35 1.60 - 2.40 mg/dL   CBC   Result Value Ref Range    WBC 11.2 4.4 - 11.3 x10*3/uL    nRBC 0.2 (H) 0.0 - 0.0 /100 WBCs    RBC 3.44 (L) 4.00 - 5.20 x10*6/uL    Hemoglobin 8.4 (L) 12.0 - 16.0 g/dL    Hematocrit 26.9 (L) 36.0 - 46.0 %    MCV 78 (L) 80 - 100 fL    MCH 24.4  (L) 26.0 - 34.0 pg    MCHC 31.2 (L) 32.0 - 36.0 g/dL    RDW 21.5 (H) 11.5 - 14.5 %    Platelets 131 (L) 150 - 450 x10*3/uL   Renal function panel   Result Value Ref Range    Glucose 94 74 - 99 mg/dL    Sodium 132 (L) 136 - 145 mmol/L    Potassium 4.1 3.5 - 5.3 mmol/L    Chloride 97 (L) 98 - 107 mmol/L    Bicarbonate 20 (L) 21 - 32 mmol/L    Anion Gap 19 10 - 20 mmol/L    Urea Nitrogen 52 (H) 6 - 23 mg/dL    Creatinine 3.62 (H) 0.50 - 1.05 mg/dL    eGFR 13 (L) >60 mL/min/1.73m*2    Calcium 7.3 (L) 8.6 - 10.6 mg/dL    Phosphorus 8.2 (H) 2.5 - 4.9 mg/dL    Albumin 2.7 (L) 3.4 - 5.0 g/dL   POCT GLUCOSE   Result Value Ref Range    POCT Glucose 94 74 - 99 mg/dL          ASSESSMENT:  Bhargavi Isidro is a 74 y.o. female with a past medical hx of A fib (on eliquis), group I/II/III pulm HTN (on remodulin and sildenafil), COPD, chronic hypoxic resp failure (5-6L at rest, 10L home O2 baseline), LEONEL, HFpEF heart block s/p pacemaker, CAD s/p CABG who is admitted for a right-sided SDH and left pubic rami fracture c/b hematoma formation following a mechanical fall. S/p anticoag reversal with Andexxa. Pt is stable without neurosurgical or orthopedic intervention, now transferred to the MICU with high oxygen requirements. Nephrology consulted for acute kidney injury and hyponatremia. Ddx of DEBORA includes cardiorenal syndrome i/s/o pHTN and right-sided heart failure and ischemic ATN given hypotension.     Updates 7/8:  - Received 120 mg lasix overnight  - Hyponatremia improving. Cr uptrending still however this may reflect progression of original insult  - Pt on dopamine drip    #DEBORA, Stage I on CKD III, oliguric  - Baseline creatinine: 1.4-1.5  - Etiology: ischemic DEBORA I/s/o persistent hypotension  - 1.04 L  urine output last 24hrs, given 120 mg lasix overnight  - UA showed:  2+ blood, leuk esterase, WBC's  - Urine electrolyes showed FeNA/FeUrea of 0.5% consistent prerenal. Urine electrolytes 7/6: Cortney 26, Uk 54, Ucr 53.3  - FeUrea  34.5%  - Clinical volume status: hypervolemic  - Electrolytes: hyponatremia, hypocalcemia  - Acid base status: Non-anion gap metabolic acidosis     Hemodynamics  - Baseline blood pressure at /55-65. On dopamine drip.      Recommendations:  - Would limit IV fluids  - Continue to diurese prn  - Maintain SBP > 100 as feasible  - Will continue to follow  - strict Is/Os  - Avoid hypotension/rapid fluctuations in BPs    Obie Varela MD  Nephrology Resident  24 hour Renal Pager - [phone]    Discussed with attending nephrologist, Dr. Bhardwaj

## 2024-07-08 NOTE — HOSPITAL COURSE
Ms. Bhargavi Isidro is a 74yoF with PMHx of Afib (s/p PPM), CAD s/p CABG, COPD, chronic hypoxic respiratory failure (5-6L at rest, 10-15L exertion) 2/2 group I/II/III pHTN (on remodulin, sildenafil) who originally presented to [facility] on 7/4 after a presumed mechanical fall where she was found down at home. On admission found to have R SDH & L inferior pubic rami fracture c/b hematoma formation, s/p eliquis reversal with andexxa. No operative interventions following consultation by IR, Ortho, Neurosurgery. While in TICU pt was intermittently hypotension and receiving maintenance and bolus IVF, and subsequently had increasing O2 requirement to 15L NC at rest and therefore was transferred to MICU 7/5. Pt continued to be hypotensive in MICU & was started on levo (7/6 - 7/7) and dopamine (7/7 - 7/9) with c/f cardiogenic shock, sildenafil was held. Pt briefly on a lasix gtt 7/8 & then intermittently bolused until back on home 6L NC on 7/10. Pt's course c/b AMS (unclear etiology, repeat CTH w/ no change in SDH, but now mental status improved), DEBORA on CKD (pre-renal likely ischemic ATN I/s/o hypotension), and c/f UTI. Pt now transferred to the floor 7/10.     Rapid response called on patient on 7/12 for O2 65%. Patient was asymptomatic at the time and started on nonrebreather. O2 increased to 99% and patient was weaned to 8L NC. CXR and ABG findings insignificant. Continued to diurese patient until stable for discharge. Sildenafil dose was changed from 20mg three times daily to 20mg twice daily and torsemide dose was changed from 40mg twice daily to 20mg once daily. Patient will be transferred to [facility].

## 2024-07-08 NOTE — PROGRESS NOTES
"Medical Intensive Care - Daily Progress Note   Subjective    Bhargavi Isidro is a 74 y.o. year old female patient with a history of A-fib, CAD s/p CABG, COPD, group I/II/III pulmonary hypertension who originally presented to [facility] on 7/4 after a presumed fall where was found down at home. Admitted on 7/4/2024 with following ICU needs: high O2 requirement     Interval History:  Was started on dopamine and levophed yesterday due to hypotension. Overnight she was weaned from the levophed and the dopamine was kept stable. She diuresed well and was negative approximately 700 ml overnight. This morning she felt well and did not have any major complaints aside from her consistent tiredness.  She denied any pain or difficulty sleeping     Meds    Scheduled medications  acetaminophen, 975 mg, oral, TID  azelastine, 1 spray, Each Nostril, BID  cefTRIAXone, 1 g, intravenous, q24h  escitalopram, 10 mg, oral, Daily  heparin (porcine), 5,000 Units, subcutaneous, q8h  lamoTRIgine, 100 mg, oral, Daily  levETIRAcetam, 500 mg, oral, BID  pantoprazole, 40 mg, oral, Daily before breakfast  perflutren protein A microsphere, 0.5 mL, intravenous, Once in imaging  predniSONE, 10 mg, oral, Daily  pregabalin, 75 mg, oral, BID  sildenafil, 20 mg, oral, BID  sulfur hexafluoride microsphr, 2 mL, intravenous, Once in imaging      Continuous medications  DOPamine, 1-5 mcg/kg/min, Last Rate: 5 mcg/kg/min (07/08/24 1400)  furosemide, 10 mg/hr, Last Rate: 10 mg/hr (07/08/24 1400)  norepinephrine, 0-0.2 mcg/kg/min, Last Rate: Stopped (07/07/24 1610)  treprostinil, 39 ng/kg/min (Order-Specific), Last Rate: 39 ng/kg/min (07/08/24 1400)      PRN medications  PRN medications: honey, HYDROmorphone, methocarbamol, ondansetron **OR** ondansetron, oxygen, polyethylene glycol     Objective    Blood pressure 112/53, pulse 70, temperature 36.2 °C (97.2 °F), temperature source Temporal, resp. rate 11, height 1.651 m (5' 5\"), weight 67.8 kg (149 lb 7.6 oz), SpO2 " 97%.     Physical Exam  Vitals and nursing note reviewed.   Constitutional:       General: She is not in acute distress.     Appearance: She is overweight.   HENT:      Head: Normocephalic and atraumatic.   Eyes:      General: Lids are normal. Vision grossly intact.      Extraocular Movements: Extraocular movements intact.   Cardiovascular:      Rate and Rhythm: Normal rate and regular rhythm.   Pulmonary:      Effort: Pulmonary effort is normal.      Breath sounds: Normal breath sounds.   Abdominal:      General: Bowel sounds are normal. There is no distension. There are no signs of injury.      Palpations: Abdomen is soft.      Tenderness: There is no abdominal tenderness.   Musculoskeletal:      Right lower leg: No edema.      Left lower leg: No edema.   Skin:     General: Skin is warm and dry.   Neurological:      Mental Status: She is alert and oriented to person, place, and time.      Motor: Tremor present.      Deep Tendon Reflexes: Babinski sign absent on the right side. Babinski sign absent on the left side.      Comments: Tremor historical   Notably less lethargic today             Intake/Output Summary (Last 24 hours) at 7/8/2024 1506  Last data filed at 7/8/2024 1400  Gross per 24 hour   Intake 635.3 ml   Output 1500 ml   Net -864.7 ml     Labs:   Results from last 72 hours   Lab Units 07/08/24  0517 07/07/24  0513 07/06/24  2101   SODIUM mmol/L 132* 128* 131*   POTASSIUM mmol/L 4.1 4.8 5.0   CHLORIDE mmol/L 97* 97* 98   CO2 mmol/L 20* 19* 20*   BUN mg/dL 52* 43* 40*   CREATININE mg/dL 3.62* 3.46* 3.27*   GLUCOSE mg/dL 94 118* 103*   CALCIUM mg/dL 7.3* 7.9* 8.1*   ANION GAP mmol/L 19 17 18   EGFR mL/min/1.73m*2 13* 13* 14*   PHOSPHORUS mg/dL 8.2* 7.0* 6.8*      Results from last 72 hours   Lab Units 07/08/24  0517 07/07/24  0513 07/06/24  0324 07/05/24  2100   WBC AUTO x10*3/uL 11.2 15.0*  15.0* 14.4* 16.8*   HEMOGLOBIN g/dL 8.4* 8.9*  8.9* 9.2* 9.6*   HEMATOCRIT % 26.9* 29.1*  29.1* 30.7* 30.6*    PLATELETS AUTO x10*3/uL 131* 149*  149* 140* 147*   NEUTROS PCT AUTO %  --  87.8 92.5 90.7   LYMPHS PCT AUTO %  --  5.1 3.3 3.2   MONOS PCT AUTO %  --  5.3 2.7 4.2   EOS PCT AUTO %  --  0.4 0.0 0.2        Micro/ID:     Lab Results   Component Value Date    URINECULTURE No growth 07/05/2024    BLOODCULT No growth at 1 day 07/07/2024       Summary of key imaging results from the last 24 hours  CT HEAD WO IV CONTRAST; 7/6/2024 3:12 am   IMPRESSION:  No significant change in size of subdural hematoma overlying the posterior right cerebral convexity again measuring up to 1.1 cm in maximal thickness. The blood products are slightly less dense in several locations within the hematoma indicative of aging blood products.  Interpreted By: Jairo Toro,     XR CHEST 1 VIEW; 7/5/2024 5:33 pm   1.  Interval improvement in pulmonary edema.  2. Cardiomegaly versus pericardial effusion.  Interpreted By: David Jiang  Assessment and Plan     Assessment: Bhargavi Isidro is a 74 y.o. year old female patient admitted on 7/4/2024 with acute on chronic hypoxic respiratory failure. She has continued to have an altered mental status and complains of being tired.     Bhargavi was switched solely to dopamine as her pressor and has done well. On physical exam she is improved and has had no noticeable changes in mental status. She was seen by wound care who's rec's were appreciated and accepted. Will attempt to diurese between -500 and -1000 ml for her tonight.     Mechanical Ventilation: none  Sedation/Analgesia:  none  Restraints: no    Summary for 07/08/24  :  Will continue with dopamine and diuresing with a goal of -500 to -1000ml  if tolerated by the patient  Mental status unchanged   Restarted sidenafil at BID dose     Plan:  NEUROLOGY/PSYCH:     Right sided subdural hematoma  ::SDH seen on CT scan following mechanical fall in the setting of Eliquis use for A-fib  ::Neurosurgery consulted, no operative management  ::S/p reversal  with Andexxa  Management:  -Neurosurgery signed off   -Repeat CT Head w/o contrast showed no significant changes in the size of the subdural hematoma  - Mental status significantly improved today is much more awake and reports feeling less tired  -Neuro checks q4  -Restarted DVT prophylaxis (heparin 5000 units) today has had no acute changes in mental status      #Major depression  #Generalized anxiety disorder  Management:  -Holding Buspar in setting of renal failure  -d/c Lexapro (esciatalopram) 10mg  -C/w lamotrigine 100mg daily  -C/w Xanax 0.5mg daily PRN for anxiety     CARDIOVASCULAR:  #Heart failure with preserved ejection fraction  #Right sided heart failure  ::TTE (6/3/2024): LVEF of 55-60%, dilated RV with low normal RV systolic function and severe tricuspid regurgiation  ::TTE (04Jul24): LVEF of 73%, with LV remodeling, Severely enlarged RV, Severely dilated RA, Severe Tricuspid regurgitation   ::RV failure likely secondary to underlying pulmonary hypertension  ::Patient has received continuous drips of LR and D10W and has received intermittent boluses since being admitted  ::Home torsemide restarted but urine output has still been low  Management:  -Holding IV fluids  -was net -700 overnight with lasix support   - patsy plan ro diurese today between -500 and -1000 ml      #Atrial fibrillation s/p ablation and PPM  ::CHADS-VASC of 4  ::S/p AV node ablation and PPM insertion  ::On Eliquis previously  Management:  -Eliquis held in setting of intracranial bleed. S/p Andexxa. Per Neurosurgery can restart on day 14 post-fall (fall occurred on July 4th)   -Maintained on telemetry, not on rate-control currently  -Current BP goal MAP of 65 or greater  - off of levophed on dopamine as sole pressor    #CAD s/p CABG  ::CABG done about 10 years ago  ::No heart cath data in her chart  Management:  -Not on statin at home  -Per Neurosurgery must wait until post-fall day (fall on July 4th)  7 before starting ASA      PULMONARY:  #Pulmonary hypertension  #Right sided heart failure  ::Has been thought to have group I, II, and III pulmonary hypertension at various times  ::Most recent RHC (10/2022) significant for RAP 20 PAP 92/45 mPAP 61 PCWP 26 CO/CI (TD) 3.1/1.74 PVR 11.9 PaSat 57. Both pre and post-capillary hypertension  ::RV dilation in TTE as above  ::Patient prescribed 5-6LNC at rest, 10-15LNC on exertion but patient apparently only uses 6LNC and tolerates saturations in the 70s  ::Patient has been on remodulin for years, refuses to use anything besides her own dilutions in her own cassette  Management:  -Holding fluids, continuing to diurese with lasix   -c/w sildenafil 20mg BID   -Remodulin dosing modified to remodulin 39ng/kg/min-no more titration-will refer for premixed cassettes   -C/w prednisone 10mg daily. This is a chronic medication for previously suspected organizing pneumonia     RENAL/GENITOURINARY:  #Acute on chronic kidney injury, stage I, oliguric  ::Baseline creatinine of around 1.4-1.5  ::FENa 0.4% indicating prerenal DEBORA  ::Renal injury possibly secondary to cardiorenal syndrome as it has worsened despite fluid administration  ::Nephrotoxic medications include Protonix  Management:  -Renal U/S results indicative of chronic kidney disease no acute findings  -Continue to monitor daily RFPs  -Abbott in place, strict I/O  -Avoid further nephrotoxic medications  -Nephrology consulted appreciate rec's   -FeUrea 23.8% suggests pre-renal disease  -Last POCT PH 7.32 from [facility] ([id number])    #Hyponatremia (Resolving)  ::Likely hypervolemic in etiology  Management:  -Consult placed to Nephro appreciate rec's   -will continue to diurese as noted above  -Continue daily RFPs  - Sodium today 133 from 128 on July 7th      GASTROENTEROLOGY:  No acute concerns     INFECTIOUS DISEASE:  #Urinary tract infection  ::No symptoms of dysuria but symptoms of reduced concentration and fatigue  ::UA showing 11-20 WBC, 25  LE  -Ceftriaxone 1g daily 5 day course (06Jul-10Jul)  - Xray chest [id number] showed no new infection  - blood cultures drawn [id number] have not yet shown growth   - Urine cultures drawn [id number] have not yet shown growth      HEMATOLOGY:  No acute concerns     MUSCULOSKELETAL/ SKIN:  #Left inferior pelvic ramus fracture  #Pelvic hematoma  ::Occurred after fall  ::No operative management per Ortho, no need for IR embolization  Management:  -PT/OT consult  -Per Ortho, if she fails trial of ambulation she may be a candidate for operative intervention for pelvic pain relief  -Pain regimen: Tylenol 975mg TID, PRN Dilaudid 0.4mg q3h PRN, Lyrica 75mg BID, Robadin 500mg BID PRN    ICU Check List       FEN  Fluids: Hold  Electrolytes: none  Nutrition: Regular K + restriceted  Drips: Dopamine  Prophylaxis:  DVT ppx: Heparin, Has refused SCD's   GI ppx: Protonix  Bowel care: Miralax PRN,   Hardware:  Catheter: Saunders  Access: PIV  Drains: Abbott  Lines: None  Social:  Code: DO NOT INTUBATE  HPOA: Jaleesa Rondon (friend) [phone]   Disposition: ICU while on Pressors    Herman Ruggiero MD PGY-1  07/08/24 at 3:06 PM     Disclaimer: Documentation completed with the information available at the time of input. The times in the chart may not be reflective of actual patient care times, interventions, or procedures. Documentation occurs after the physical care of the patient.

## 2024-07-08 NOTE — CONSULTS
Wound Care Consult     Visit Date: 7/8/2024      Patient Name: Bhargavi Isidro         MRN: [id number]           YOB: 1950     Reason for Consult: Left arm skin tears/ Left face skin tears       Wound Assessment:  Wound 07/04/24 Skin Tear Arm Dorsal;Left;Lower;Proximal (Active)   Wound Image   07/07/24 0126   Site Assessment Red 07/08/24 1444   Jess-Wound Assessment Dark edges;Ecchymotic 07/08/24 1444   Non-staged Wound Description Partial thickness 07/08/24 1444   Margins Poorly defined 07/04/24 1600   Drainage Description Serosanguineous 07/08/24 1444   Drainage Amount Small 07/08/24 1444   Dressing Xeroform;Foam 07/08/24 1444   Dressing Changed New 07/08/24 1444   Dressing Status Clean;Dry;Occlusive 07/07/24 2000       Wound 07/05/24 Other (comment) Left (Active)   Site Assessment Dry;Necrotic 07/08/24 1442   Jess-Wound Assessment Purple;Ecchymotic 07/08/24 1442   Wound Length (cm) 2 cm 07/08/24 1442   Wound Width (cm) 2 cm 07/08/24 1442   Wound Surface Area (cm^2) 4 cm^2 07/08/24 1442   State of Healing Eschar 07/08/24 1442   Drainage Description None 07/08/24 1442   Drainage Amount None 07/08/24 1442   Dressing Other (Comment);Foam 07/08/24 1442   Dressing Changed New 07/08/24 1442       Wound 07/05/24 Face Left (Active)   Wound Image   07/07/24 0554     Wound location: Left face/ Left lateral eyebrow    Recommendations: DAILY      Soak with NS soaked 4x4 gauze for 2-5 mins, pat dry     Cover wounds with Medihoney ([employer] order #[id number])        Cover with Mepilex lite ([employer] order #[id number])     Please use warm soapy water to wash dried adherent drainage from Left hairline and around left ear.     Wound location: Left anterior upper arm/ left posterior lateral elbow and posterior Left lower arm   Recommendations: DAILY     Soak with NS soaked 4x4 gauze for 2-5 mins, pat dry     Cover skin tears with xeroform      Cover with mepilex transfer ([employer] order #[id number])  to upper  arm and mepilex lite to posterolateral wounds.      Wound Team Plan: Primary provider, please review recommendation. If you agree with recommendation please enter as wound orders in EMR. Thank you.     While inpatient, Secure chat with questions or if condition changes. For urgent communications please [place] the [place] [phone].       Zakia Oliver RN, CWON  7/8/2024  2:46 PM

## 2024-07-08 NOTE — CARE PLAN
Problem: Pain - Adult  Goal: Verbalizes/displays adequate comfort level or baseline comfort level  Outcome: Progressing     Problem: Safety - Adult  Goal: Free from fall injury  Outcome: Progressing     Problem: Discharge Planning  Goal: Discharge to home or other facility with appropriate resources  Outcome: Progressing     Problem: Chronic Conditions and Co-morbidities  Goal: Patient's chronic conditions and co-morbidity symptoms are monitored and maintained or improved  Outcome: Progressing     Problem: Skin  Goal: Decreased wound size/increased tissue granulation at next dressing change  Outcome: Progressing  Flowsheets (Taken 7/8/2024 0136)  Decreased wound size/increased tissue granulation at next dressing change:   Promote sleep for wound healing   Protective dressings over bony prominences  Goal: Participates in plan/prevention/treatment measures  Outcome: Progressing  Flowsheets (Taken 7/8/2024 0136)  Participates in plan/prevention/treatment measures:   Discuss with provider PT/OT consult   Elevate heels   Increase activity/out of bed for meals  Goal: Prevent/manage excess moisture  Outcome: Progressing  Flowsheets (Taken 7/8/2024 0136)  Prevent/manage excess moisture:   Cleanse incontinence/protect with barrier cream   Moisturize dry skin   Monitor for/manage infection if present  Goal: Prevent/minimize sheer/friction injuries  Outcome: Progressing  Flowsheets (Taken 7/8/2024 0136)  Prevent/minimize sheer/friction injuries:   Complete micro-shifts as needed if patient unable. Adjust patient position to relieve pressure points, not a full turn   HOB 30 degrees or less   Increase activity/out of bed for meals   Turn/reposition every 2 hours/use positioning/transfer devices   Use pull sheet  Goal: Promote/optimize nutrition  Outcome: Progressing  Flowsheets (Taken 7/8/2024 0136)  Promote/optimize nutrition:   Monitor/record intake including meals   Consume > 50% meals/supplements  Goal: Promote skin  healing  Outcome: Progressing  Flowsheets (Taken 7/8/2024 0136)  Promote skin healing:   Protective dressings over bony prominences   Turn/reposition every 2 hours/use positioning/transfer devices       The clinical goals for the shift include pt will maintain MAPs > 65 through shift

## 2024-07-08 NOTE — PROGRESS NOTES
Physical Therapy    Physical Therapy Treatment    Patient Name: Bhargavi Isidro  MRN: [id number]  Today's Date: 7/8/2024  Time Calculation  Start Time: 1107  Stop Time: 1148  Time Calculation (min): 41 min    PT student under the direct supervision of a licensed physical therapist     Assessment/Plan   PT Assessment  End of Session Communication: Bedside nurse  Assessment Comment: pt sat EOB for 25 mins with CGA-MinAx1 and requiriing frequent verbal and tactile cues to facilitate anterior lean and ist in an upright and midline posture when sitting EOB. pt completed ther-ex activity to work on B LE strength. pt also performed x1 STS with MaxAx2. pt would continue to benefit from skilled PT services to work on strength, balance, and mobility.  End of Session Patient Position: Bed, 3 rail up, Alarm off, not on at start of session (Dependent chair position)     PT Plan  Treatment/Interventions: Bed mobility, Transfer training, Gait training, Stair training, Balance training, Strengthening, Endurance training, Range of motion, Therapeutic exercise, Therapeutic activity  PT Plan: Ongoing PT  PT Frequency: 6 times per week  PT Discharge Recommendations: Moderate intensity level of continued care  PT Recommended Transfer Status: Assist x1  PT - OK to Discharge: Yes      General Visit Information:   PT  Visit  PT Received On: 07/08/24  General  Reason for Referral: pt transferred to the MICU for management of acute on chronic hypoxic respiratory failure in setting of severe pHTN. repeat CTH no significant change in SDH size.  Prior to Session Communication: Bedside nurse  Patient Position Received: Bed, 3 rail up, Alarm off, not on at start of session  General Comment: Pt is pleasant and cooperative to participate in PT services. pt initially lethargic and required repositioning to increase alertness level. Increased stim required throughout therapy to keep eyes open. pt demos limited L shoulder MD JESSICA made  aware.    Subjective   Precautions:  Precautions  LE Weight Bearing Status: Weight Bearing as Tolerated (B LEs)  Medical Precautions: Fall precautions, Oxygen therapy device and L/min  Vital Signs:  Vital Signs  Heart Rate:  (Pre: 72   Post: 71)  Resp:  (Pre: 12   Post: 20)  SpO2:  (Pre: 99% Post: 93%)  BP:  (Pre: 122/49 (67) EOB MAP briefly decreased to 61, 113/60 (66)   Post: 125/50 (69))  BP Method: Automatic    Objective   Pain:  Pain Assessment  Pain Assessment: 0-10  0-10 (Numeric) Pain Score: 0 - No pain  Cognition:  Cognition  Overall Cognitive Status: Impaired  Arousal/Alertness:  (Occassional delayed response to stimuli d/t decreased alertness level)  Orientation Level:  (AOx3)  Following Commands: Follows one step commands with increased time (and repetition, 90%)       Postural Control:  Postural Control  Postural Control: Impaired (Favors retrolean and R retrolateral lean when sitting EOB.)  Head Control: Occassional forward flexed posture and downward head posture when sitting EOB  Trunk Control: Impaired  Static Sitting Balance  Static Sitting-Balance Support: Feet supported (1-2 UE supported)  Static Sitting-Level of Assistance:  (MinAx1, brief CGAx1 with increased cueing to lean forward to decrease retrolean)  Static Sitting-Comment/Number of Minutes: 25 mins total EOB. Verbal and tactile cueing to facilitate anterior lean d/t pt retroleaning and favoring R lean. pt able to maintain upright and midline posture for ~ 20 secs with cueing. Therapist provided manual assist to keep pt's head upright, which pt was able to hold for only ~ 5 secs  Dynamic Sitting Balance  Dynamic Sitting-Balance Support: Feet supported (1-2 UE support)  Dynamic Sitting-Comments: ModAx1. Verbal and tactile cueing to facilitate anterior lean d/t pt retroleaning and favoring R lean.  Static Standing Balance  Static Standing-Balance Support:  (Arm in arm assist)  Static Standing-Level of Assistance: Maximum assistance  (x2)  Static Standing-Comment/Number of Minutes: Blocking B knees and feet. pt standing for ~ 30 secs. (Therapist provided verbal and tactile cueing to facilitate upright posture. pt able to achieve full standing but with flexed posture.)     Activity Tolerance:  Activity Tolerance  Early Mobility/Exercise Safety Screen: Proceed with mobilization - No exclusion criteria met  Treatments:  Therapeutic Exercise  Therapeutic Exercise Performed: Yes  Therapeutic Exercise Activity 1: AAROM LAQ x10 each leg, AROM shoulder retraction x10, AROM shoulder shrug x10    Bed Mobility  Bed Mobility: Yes  Bed Mobility 1  Bed Mobility 1: Supine to sitting, Sitting to supine  Level of Assistance 1: Maximum assistance, +2, Moderate verbal cues, Maximum tactile cues  Bed Mobility Comments 1: HOB elevated, draw sheet, use of bedrail    Ambulation/Gait Training  Ambulation/Gait Training Performed: No  Transfers  Transfer: Yes  Transfer 1  Transfer From 1: Sit to, Stand to  Transfer to 1: Stand, Sit  Technique 1: Sit to stand, Stand to sit  Transfer Level of Assistance 1: Maximum assistance, +2, Maximum tactile cues, Moderate verbal cues  Trials/Comments 1: Blocking of B feet and knees, draw sheet. Cues for hand placement    Outcome Measures:  [facility] Basic Mobility  Turning from your back to your side while in a flat bed without using bedrails: Total  Moving from lying on your back to sitting on the side of a flat bed without using bedrails: Total  Moving to and from bed to chair (including a wheelchair): Total  Standing up from a chair using your arms (e.g. wheelchair or bedside chair): A lot  To walk in hospital room: Total  Climbing 3-5 steps with railing: Total  Basic Mobility - Total Score: 7    FSS-ICU  Ambulation: Unable to attempt due to weakness  Rolling: Total assistance (performs 25% or requires another person)  Sitting: Minimal assistance (performs 75% or more of task)  Transfer Sit-to-Stand: Total assistance (performs 25% or  requires another person)  Transfer Supine-to-Sit: Total assistance (performs 25% or requires another person)  Total Score: 7      Early Mobility/Exercise Safety Screen: Proceed with mobilization - No exclusion criteria met  ICU Mobility Scale: Standing [4]    Education Documentation  Mobility Training, taught by LETTY NugentPT at 7/8/2024  2:17 PM.  Learner: Patient  Readiness: Acceptance  Method: Explanation  Response: Demonstrated Understanding  Comment: Mobility progression, hand placement, postural education         Encounter Problems       Encounter Problems (Active)       Balance       Pt. will score >18 on Tinetti for lower risk of falls (Not Progressing)       Start:  07/05/24    Expected End:  07/19/24               Mobility       Patient will ambulate >25ft. with LRAD CGA.  (Not Progressing)       Start:  07/05/24    Expected End:  07/19/24            Patient will ascend and descend 2 stairs with railing with LRAD MinAx1. (Not Progressing)       Start:  07/05/24    Expected End:  07/19/24               PT Transfers       Patient will perform bed mobility Indep. (Not Progressing)       Start:  07/05/24    Expected End:  07/19/24            Patient will transfer sit to and from stand with LRAD CGA.  (Progressing)       Start:  07/05/24    Expected End:  07/19/24                       Manisha Herrera, SPT

## 2024-07-09 LAB
ALBUMIN SERPL BCP-MCNC: 3 G/DL (ref 3.4–5)
ANION GAP SERPL CALC-SCNC: 17 MMOL/L (ref 10–20)
BUN SERPL-MCNC: 52 MG/DL (ref 6–23)
CALCIUM SERPL-MCNC: 7.7 MG/DL (ref 8.6–10.6)
CHLORIDE SERPL-SCNC: 100 MMOL/L (ref 98–107)
CO2 SERPL-SCNC: 24 MMOL/L (ref 21–32)
CREAT SERPL-MCNC: 3.16 MG/DL (ref 0.5–1.05)
EGFRCR SERPLBLD CKD-EPI 2021: 15 ML/MIN/1.73M*2
ERYTHROCYTE [DISTWIDTH] IN BLOOD BY AUTOMATED COUNT: 21.8 % (ref 11.5–14.5)
GLUCOSE BLD MANUAL STRIP-MCNC: 178 MG/DL (ref 74–99)
GLUCOSE BLD MANUAL STRIP-MCNC: 91 MG/DL (ref 74–99)
GLUCOSE SERPL-MCNC: 104 MG/DL (ref 74–99)
HCT VFR BLD AUTO: 27.4 % (ref 36–46)
HGB BLD-MCNC: 8.6 G/DL (ref 12–16)
MAGNESIUM SERPL-MCNC: 2.29 MG/DL (ref 1.6–2.4)
MCH RBC QN AUTO: 23.8 PG (ref 26–34)
MCHC RBC AUTO-ENTMCNC: 31.4 G/DL (ref 32–36)
MCV RBC AUTO: 76 FL (ref 80–100)
NRBC BLD-RTO: 0.2 /100 WBCS (ref 0–0)
PHOSPHATE SERPL-MCNC: 7.3 MG/DL (ref 2.5–4.9)
PLATELET # BLD AUTO: 129 X10*3/UL (ref 150–450)
POTASSIUM SERPL-SCNC: 3.4 MMOL/L (ref 3.5–5.3)
RBC # BLD AUTO: 3.62 X10*6/UL (ref 4–5.2)
SODIUM SERPL-SCNC: 138 MMOL/L (ref 136–145)
WBC # BLD AUTO: 8.5 X10*3/UL (ref 4.4–11.3)

## 2024-07-09 PROCEDURE — 2500000004 HC RX 250 GENERAL PHARMACY W/ HCPCS (ALT 636 FOR OP/ED)

## 2024-07-09 PROCEDURE — 85027 COMPLETE CBC AUTOMATED: CPT

## 2024-07-09 PROCEDURE — 82947 ASSAY GLUCOSE BLOOD QUANT: CPT

## 2024-07-09 PROCEDURE — 2500000001 HC RX 250 WO HCPCS SELF ADMINISTERED DRUGS (ALT 637 FOR MEDICARE OP)

## 2024-07-09 PROCEDURE — 97110 THERAPEUTIC EXERCISES: CPT | Mod: GO

## 2024-07-09 PROCEDURE — 2500000002 HC RX 250 W HCPCS SELF ADMINISTERED DRUGS (ALT 637 FOR MEDICARE OP, ALT 636 FOR OP/ED)

## 2024-07-09 PROCEDURE — 80069 RENAL FUNCTION PANEL: CPT

## 2024-07-09 PROCEDURE — 94660 CPAP INITIATION&MGMT: CPT

## 2024-07-09 PROCEDURE — 99232 SBSQ HOSP IP/OBS MODERATE 35: CPT

## 2024-07-09 PROCEDURE — 97530 THERAPEUTIC ACTIVITIES: CPT | Mod: GO

## 2024-07-09 PROCEDURE — 2500000005 HC RX 250 GENERAL PHARMACY W/O HCPCS: Performed by: STUDENT IN AN ORGANIZED HEALTH CARE EDUCATION/TRAINING PROGRAM

## 2024-07-09 PROCEDURE — 2020000001 HC ICU ROOM DAILY

## 2024-07-09 PROCEDURE — 83735 ASSAY OF MAGNESIUM: CPT

## 2024-07-09 PROCEDURE — 37799 UNLISTED PX VASCULAR SURGERY: CPT

## 2024-07-09 RX ORDER — DOPAMINE HYDROCHLORIDE 160 MG/100ML
1-5 INJECTION, SOLUTION INTRAVENOUS CONTINUOUS
Status: DISCONTINUED | OUTPATIENT
Start: 2024-07-09 | End: 2024-07-09

## 2024-07-09 RX ORDER — POTASSIUM CHLORIDE 1.5 G/1.58G
20 POWDER, FOR SOLUTION ORAL ONCE
Status: COMPLETED | OUTPATIENT
Start: 2024-07-09 | End: 2024-07-09

## 2024-07-09 RX ORDER — CALCIUM CARBONATE 200(500)MG
1000 TABLET,CHEWABLE ORAL ONCE
Status: COMPLETED | OUTPATIENT
Start: 2024-07-09 | End: 2024-07-09

## 2024-07-09 RX ORDER — FUROSEMIDE 10 MG/ML
40 INJECTION INTRAMUSCULAR; INTRAVENOUS ONCE
Status: COMPLETED | OUTPATIENT
Start: 2024-07-09 | End: 2024-07-09

## 2024-07-09 RX ORDER — DOPAMINE HYDROCHLORIDE 160 MG/100ML
1-5 INJECTION, SOLUTION INTRAVENOUS CONTINUOUS
Status: DISCONTINUED | OUTPATIENT
Start: 2024-07-09 | End: 2024-07-10

## 2024-07-09 RX ADMIN — LEVETIRACETAM 500 MG: 500 TABLET, FILM COATED ORAL at 21:08

## 2024-07-09 RX ADMIN — ESCITALOPRAM 10 MG: 10 TABLET, FILM COATED ORAL at 08:34

## 2024-07-09 RX ADMIN — FUROSEMIDE 40 MG: 10 INJECTION, SOLUTION INTRAMUSCULAR; INTRAVENOUS at 18:20

## 2024-07-09 RX ADMIN — ACETAMINOPHEN 975 MG: 325 TABLET ORAL at 15:13

## 2024-07-09 RX ADMIN — LEVETIRACETAM 500 MG: 500 TABLET, FILM COATED ORAL at 08:34

## 2024-07-09 RX ADMIN — HEPARIN SODIUM 5000 UNITS: 5000 INJECTION INTRAVENOUS; SUBCUTANEOUS at 05:25

## 2024-07-09 RX ADMIN — PREDNISONE 10 MG: 20 TABLET ORAL at 08:34

## 2024-07-09 RX ADMIN — SILDENAFIL 20 MG: 20 TABLET ORAL at 21:08

## 2024-07-09 RX ADMIN — AZELASTINE HYDROCHLORIDE 1 SPRAY: 137 SPRAY, METERED NASAL at 21:09

## 2024-07-09 RX ADMIN — ACETAMINOPHEN 975 MG: 325 TABLET ORAL at 21:08

## 2024-07-09 RX ADMIN — SILDENAFIL 20 MG: 20 TABLET ORAL at 08:34

## 2024-07-09 RX ADMIN — AZELASTINE HYDROCHLORIDE 1 SPRAY: 137 SPRAY, METERED NASAL at 08:35

## 2024-07-09 RX ADMIN — Medication 10 L/MIN: at 20:24

## 2024-07-09 RX ADMIN — ACETAMINOPHEN 975 MG: 325 TABLET ORAL at 08:34

## 2024-07-09 RX ADMIN — HEPARIN SODIUM 5000 UNITS: 5000 INJECTION INTRAVENOUS; SUBCUTANEOUS at 13:57

## 2024-07-09 RX ADMIN — CALCIUM CARBONATE (ANTACID) CHEW TAB 500 MG 1000 MG: 500 CHEW TAB at 08:33

## 2024-07-09 RX ADMIN — PANTOPRAZOLE SODIUM 40 MG: 40 TABLET, DELAYED RELEASE ORAL at 08:34

## 2024-07-09 RX ADMIN — Medication 10 L/MIN: at 04:00

## 2024-07-09 RX ADMIN — PREGABALIN 75 MG: 25 CAPSULE ORAL at 08:34

## 2024-07-09 RX ADMIN — LAMOTRIGINE 100 MG: 100 TABLET ORAL at 08:34

## 2024-07-09 RX ADMIN — TREPROSTINIL 39 NG/KG/MIN: 5 INJECTION, SOLUTION INTRAVENOUS; SUBCUTANEOUS at 20:59

## 2024-07-09 RX ADMIN — HEPARIN SODIUM 5000 UNITS: 5000 INJECTION INTRAVENOUS; SUBCUTANEOUS at 21:08

## 2024-07-09 RX ADMIN — POTASSIUM CHLORIDE 20 MEQ: 1.5 POWDER, FOR SOLUTION ORAL at 06:11

## 2024-07-09 RX ADMIN — CEFTRIAXONE SODIUM 1 G: 1 INJECTION, SOLUTION INTRAVENOUS at 00:07

## 2024-07-09 RX ADMIN — PREGABALIN 75 MG: 25 CAPSULE ORAL at 21:08

## 2024-07-09 RX ADMIN — DOPAMINE HYDROCHLORIDE 5 MCG/KG/MIN: 160 INJECTION, SOLUTION INTRAVENOUS at 04:37

## 2024-07-09 ASSESSMENT — PAIN SCALES - GENERAL
PAINLEVEL_OUTOF10: 0 - NO PAIN
PAINLEVEL_OUTOF10: 2
PAINLEVEL_OUTOF10: 0 - NO PAIN
PAINLEVEL_OUTOF10: 0 - NO PAIN

## 2024-07-09 ASSESSMENT — PAIN DESCRIPTION - DESCRIPTORS: DESCRIPTORS: ACHING;SORE

## 2024-07-09 ASSESSMENT — COGNITIVE AND FUNCTIONAL STATUS - GENERAL
MOBILITY SCORE: 8
MOVING TO AND FROM BED TO CHAIR: TOTAL
MOVING FROM LYING ON BACK TO SITTING ON SIDE OF FLAT BED WITH BEDRAILS: A LOT
STANDING UP FROM CHAIR USING ARMS: TOTAL
WALKING IN HOSPITAL ROOM: TOTAL
DRESSING REGULAR UPPER BODY CLOTHING: A LOT
EATING MEALS: A LITTLE
PERSONAL GROOMING: A LOT
CLIMB 3 TO 5 STEPS WITH RAILING: TOTAL
TOILETING: A LOT
EATING MEALS: A LITTLE
TURNING FROM BACK TO SIDE WHILE IN FLAT BAD: A LOT
DAILY ACTIVITIY SCORE: 11
TOILETING: TOTAL
DAILY ACTIVITIY SCORE: 14
DRESSING REGULAR LOWER BODY CLOTHING: A LOT
PERSONAL GROOMING: A LITTLE
DRESSING REGULAR LOWER BODY CLOTHING: TOTAL
HELP NEEDED FOR BATHING: A LOT
DRESSING REGULAR UPPER BODY CLOTHING: A LOT
HELP NEEDED FOR BATHING: A LOT

## 2024-07-09 ASSESSMENT — PAIN - FUNCTIONAL ASSESSMENT
PAIN_FUNCTIONAL_ASSESSMENT: 0-10

## 2024-07-09 ASSESSMENT — VISUAL ACUITY: OU: 1

## 2024-07-09 NOTE — CARE PLAN
Problem: Pain - Adult  Goal: Verbalizes/displays adequate comfort level or baseline comfort level  Outcome: Progressing     Problem: Safety - Adult  Goal: Free from fall injury  Outcome: Progressing     Problem: Discharge Planning  Goal: Discharge to home or other facility with appropriate resources  Outcome: Progressing     Problem: Chronic Conditions and Co-morbidities  Goal: Patient's chronic conditions and co-morbidity symptoms are monitored and maintained or improved  Outcome: Progressing     Problem: Skin  Goal: Decreased wound size/increased tissue granulation at next dressing change  Outcome: Progressing  Flowsheets (Taken 7/8/2024 2310)  Decreased wound size/increased tissue granulation at next dressing change:   Promote sleep for wound healing   Protective dressings over bony prominences  Goal: Participates in plan/prevention/treatment measures  Outcome: Progressing  Flowsheets (Taken 7/8/2024 2310)  Participates in plan/prevention/treatment measures:   Discuss with provider PT/OT consult   Elevate heels  Goal: Prevent/manage excess moisture  Outcome: Progressing  Flowsheets (Taken 7/8/2024 2310)  Prevent/manage excess moisture:   Cleanse incontinence/protect with barrier cream   Moisturize dry skin   Follow provider orders for dressing changes  Goal: Prevent/minimize sheer/friction injuries  Outcome: Progressing  Flowsheets (Taken 7/8/2024 2310)  Prevent/minimize sheer/friction injuries:   Complete micro-shifts as needed if patient unable. Adjust patient position to relieve pressure points, not a full turn   HOB 30 degrees or less   Increase activity/out of bed for meals   Turn/reposition every 2 hours/use positioning/transfer devices   Use pull sheet  Goal: Promote/optimize nutrition  Outcome: Progressing  Flowsheets (Taken 7/8/2024 2310)  Promote/optimize nutrition:   Assist with feeding   Consume > 50% meals/supplements   Monitor/record intake including meals   Offer water/supplements/favorite  foods  Goal: Promote skin healing  Outcome: Progressing  Flowsheets (Taken 7/8/2024 2310)  Promote skin healing:   Assess skin/pad under line(s)/device(s)   Protective dressings over bony prominences   Turn/reposition every 2 hours/use positioning/transfer devices       The clinical goals for the shift include Patient will maintain MAP >65 throughout the shift

## 2024-07-09 NOTE — PROGRESS NOTES
Social Work Transitional Care Note   - ICU TREATMENT PLAN: Patient was transferred to MICU for acute on chronic hypoxic respiratory failure.   - Payer: [employer], Medicaid  -Support System: Friends Jaleesa and Ronda are listed as NOK.   - Planned Disposition: Pending medical outcome. Rehab recommends MOD Intensity.  - Additional Information: None at this time.  - Barriers to discharge: None at this time. SW will continue to follow.   CARIE HUTCHINSON

## 2024-07-09 NOTE — CARE PLAN
The patient's goals for the shift include      The clinical goals for the shift include wean pressors as tolerated to map greater than 65    Over the shift, the patient did not make progress toward the following goals. Barriers to progression include . Recommendations to address these barriers include .      Problem: Pain - Adult  Goal: Verbalizes/displays adequate comfort level or baseline comfort level  Outcome: Progressing     Problem: Safety - Adult  Goal: Free from fall injury  Outcome: Progressing     Problem: Discharge Planning  Goal: Discharge to home or other facility with appropriate resources  Outcome: Progressing     Problem: Chronic Conditions and Co-morbidities  Goal: Patient's chronic conditions and co-morbidity symptoms are monitored and maintained or improved  Outcome: Progressing     Problem: Skin  Goal: Decreased wound size/increased tissue granulation at next dressing change  Outcome: Progressing  Goal: Participates in plan/prevention/treatment measures  Outcome: Progressing  Goal: Prevent/manage excess moisture  Outcome: Progressing  Goal: Prevent/minimize sheer/friction injuries  Outcome: Progressing  Goal: Promote/optimize nutrition  Outcome: Progressing  Goal: Promote skin healing  Outcome: Progressing     Problem: Pain  Goal: Takes deep breaths with improved pain control throughout the shift  Outcome: Progressing  Goal: Turns in bed with improved pain control throughout the shift  Outcome: Progressing  Goal: Walks with improved pain control throughout the shift  Outcome: Progressing  Goal: Performs ADL's with improved pain control throughout shift  Outcome: Progressing  Goal: Participates in PT with improved pain control throughout the shift  Outcome: Progressing  Goal: Free from opioid side effects throughout the shift  Outcome: Progressing  Goal: Free from acute confusion related to pain meds throughout the shift  Outcome: Progressing

## 2024-07-09 NOTE — PROGRESS NOTES
Occupational Therapy    OT Treatment    Patient Name: Bhargavi Isidro  MRN: [id number]  Today's Date: 7/10/2024  Time Calculation  Start Time: 1441  Stop Time: 1504  Time Calculation (min): 23 min        Assessment:  Medical Staff Made Aware: Yes  End of Session Communication: Bedside nurse  End of Session Patient Position: Bed, 3 rail up, Alarm off, caregiver present  Medical Staff Made Aware: Yes  Plan:  Treatment Interventions: ADL retraining, Functional transfer training, Endurance training, UE strengthening/ROM, Neuromuscular reeducation  OT Frequency: 4 times per week  OT Discharge Recommendations: Moderate intensity level of continued care  Equipment Recommended upon Discharge: Wheeled walker  OT Recommended Transfer Status: Minimal assist, Assist of 2  OT - OK to Discharge: Yes  Treatment Interventions: ADL retraining, Functional transfer training, Endurance training, UE strengthening/ROM, Neuromuscular reeducation    Subjective   Previous Visit Info:  OT Last Visit  OT Received On: 07/09/24  General:  General  Reason for Referral: pt transferred to the MICU for management of acute on chronic hypoxic respiratory failure in setting of severe pHTN. repeat CTH no significant change in SDH size.  Prior to Session Communication: Bedside nurse  Patient Position Received: Bed, 3 rail up, Alarm off, not on at start of session  Preferred Learning Style: auditory, verbal, visual  General Comment: Pt supine in bed upon entry to room. pt pleasant and excited about progress she is making. ON 8L Nc, tele. Pt returned to bed for dressing change  Precautions:  LE Weight Bearing Status: Weight Bearing as Tolerated (BLE)  Medical Precautions: Fall precautions, Oxygen therapy device and L/min  Vital Signs:  Vital Signs  Heart Rate: 71 (71 post)  Resp: 16 (14 post)  SpO2: 99 % (96 post)  BP: 118/54 (102/50)  MAP (mmHg): 70 (64 post)  Pain:  Pain Assessment  Pain Assessment: 0-10  0-10 (Numeric) Pain Score: 0 - No pain    Objective     Cognition:  Cognition  Overall Cognitive Status: Within Functional Limits  Insight: Mild  Impulsive: Within functional limits  Processing Speed: Within funtional limits       Bed Mobility/Transfers: Bed Mobility  Bed Mobility: Yes  Bed Mobility 1  Bed Mobility 1: Sitting to supine  Level of Assistance 1: Moderate assistance (x1)  Bed Mobility Comments 1: step by step sequencing, pt advancing BLE to EOB with MIn A. Min A for pivot  Bed Mobility 2  Bed Mobility  2: Sitting to supine  Level of Assistance 2: Dependent (x1)  Bed Mobility 3  Bed Mobility 3: Scooting  Level of Assistance 3: Dependent (x2)  Bed Mobility Comments 3: boost to HOB       Therapy/Activity: Therapeutic Exercise  Therapeutic Exercise Performed: Yes  Therapeutic Exercise Activity 1: AAROM shoulder flexion with isometric contraction x5, sup/pro x5, AAROM elbow flex/ext x5    Therapeutic Activity  Therapeutic Activity Performed: Yes  Therapeutic Activity 1: Sitting EOB x7 minutes with Min A. Pt supports selfw ith RUE, tactile cues for balance. Min A with bouts of CGA        Outcome Measures:[facility] Daily Activity  Putting on and taking off regular lower body clothing: Total  Bathing (including washing, rinsing, drying): A lot  Putting on and taking off regular upper body clothing: A lot  Toileting, which includes using toilet, bedpan or urinal: Total  Taking care of personal grooming such as brushing teeth: A lot  Eating Meals: A little  Daily Activity - Total Score: 11        , Confusion Assessment Method-ICU (CAM-ICU)  Feature 1: Acute Onset or Fluctuating Course: Negative  Overall CAM-ICU: Negative  , and      Education Documentation  Body Mechanics, taught by Jackie Fowler OT at 7/9/2024  3:36 PM.  Learner: Patient  Readiness: Acceptance  Method: Explanation  Response: Verbalizes Understanding    Precautions, taught by Jackie Fowler OT at 7/9/2024  3:36 PM.  Learner: Patient  Readiness: Acceptance  Method: Explanation  Response: Verbalizes  Understanding    ADL Training, taught by Jackie Fowler OT at 7/9/2024  3:36 PM.  Learner: Patient  Readiness: Acceptance  Method: Explanation  Response: Verbalizes Understanding    Education Comments  No comments found.        OP EDUCATION:       Goals:  Encounter Problems       Encounter Problems (Active)       ADLs       Patient will complete lower body dressing with MIN A for donning and doffing all LE clothes in order to increase Indep with task participation.  (Progressing)       Start:  07/05/24    Expected End:  07/19/24            Patient will complete toileting, including clothing management and hygiene, with MIN A in order to maximize functional Indep with task completion.  (Progressing)       Start:  07/05/24    Expected End:  07/19/24               BALANCE       Pt will increase static/dynamic stand to Good to increase safety and indep with functional task completion.   (Progressing)       Start:  07/05/24    Expected End:  07/19/24               EXERCISE/STRENGTHENING       Pt will increase overall BUE strength to 4+/5 in order to increase functional task participation.  (Progressing)       Start:  07/05/24    Expected End:  07/19/24            Pt will increase endurance to tolerate 15-20min of activity with no more than 1 rest break in order to increase ability to engage in ADL completion.  (Progressing)       Start:  07/05/24    Expected End:  07/19/24               MOBILITY       Pt will demo increased functional mobility to tolerate tasks necessary to complete ADL routine with SBA and LRD. (Progressing)       Start:  07/05/24    Expected End:  07/19/24               TRANSFERS       Patient will complete functional transfers using least restrictive device with SBA  in order to maximize functional potential and increase safety.  (Progressing)       Start:  07/05/24    Expected End:  07/19/24                     07/10/24 at 7:55 AM - Jackie Fowler OT

## 2024-07-09 NOTE — PROGRESS NOTES
NEPHROLOGY FOLLOW UP NOTE    Bhargavi BISHOP Sanna   74 y.o.    @WT@  MRN/Room: [id number]/25/25-A    Subjective: Pt resting comfortably in bed. On 8L high flow NC. Not on pressors today. Pt is alert and more awake today. She denies any new concerns.     Objective:     Meds:   acetaminophen, 975 mg, TID  azelastine, 1 spray, BID  cefTRIAXone, 1 g, q24h  escitalopram, 10 mg, Daily  heparin (porcine), 5,000 Units, q8h  lamoTRIgine, 100 mg, Daily  levETIRAcetam, 500 mg, BID  pantoprazole, 40 mg, Daily before breakfast  perflutren protein A microsphere, 0.5 mL, Once in imaging  predniSONE, 10 mg, Daily  pregabalin, 75 mg, BID  sildenafil, 20 mg, BID  sulfur hexafluoride microsphr, 2 mL, Once in imaging      DOPamine  [Held by provider] furosemide, Last Rate: Stopped (07/08/24 2040)  norepinephrine, Last Rate: Stopped (07/07/24 1610)  treprostinil, Last Rate: 39 ng/kg/min (07/09/24 0600)      HYDROmorphone, 0.4 mg, q3h PRN  methocarbamol, 500 mg, BID PRN  ondansetron, 4 mg, q8h PRN   Or  ondansetron, 4 mg, q8h PRN  oxygen, , Continuous PRN - O2/gases  polyethylene glycol, 17 g, Daily PRN        Vitals:    07/09/24 0900   BP: (!) 99/47   Pulse: 69   Resp: 17   Temp:    SpO2: 90%          Intake/Output Summary (Last 24 hours) at 7/9/2024 0925  Last data filed at 7/9/2024 0900  Gross per 24 hour   Intake 990.48 ml   Output 2835 ml   Net -1844.52 ml       General appearance: AAOx3. No distress. On 8L high flow NC  Eyes: non-icteric  Skin: left-sided facial and periorbital ecchymosis. Diffuse bruising of left shoulder, bilateral lower legs  Heart: rhythm regular. no murmur  Lungs: CTA bilat.  no wheezing/crackles  Abdomen: soft, nt/nd  Extremities: 1+ edema bilat  :  Abbott  Neuro: No FND,  asterixis. Somnolent and lethargic but easily aroused  ACCESS: right CVC, pIV    Blood Labs:  Results for orders placed or performed during the hospital encounter of 07/04/24 (from the past 24 hour(s))   POCT GLUCOSE   Result Value Ref Range     POCT Glucose 135 (H) 74 - 99 mg/dL   POCT GLUCOSE   Result Value Ref Range    POCT Glucose 142 (H) 74 - 99 mg/dL   Renal function panel   Result Value Ref Range    Glucose 118 (H) 74 - 99 mg/dL    Sodium 133 (L) 136 - 145 mmol/L    Potassium 3.6 3.5 - 5.3 mmol/L    Chloride 98 98 - 107 mmol/L    Bicarbonate 22 21 - 32 mmol/L    Anion Gap 17 10 - 20 mmol/L    Urea Nitrogen 52 (H) 6 - 23 mg/dL    Creatinine 3.62 (H) 0.50 - 1.05 mg/dL    eGFR 13 (L) >60 mL/min/1.73m*2    Calcium 8.0 (L) 8.6 - 10.6 mg/dL    Phosphorus 7.7 (H) 2.5 - 4.9 mg/dL    Albumin 3.0 (L) 3.4 - 5.0 g/dL   POCT GLUCOSE   Result Value Ref Range    POCT Glucose 136 (H) 74 - 99 mg/dL   Magnesium   Result Value Ref Range    Magnesium 2.29 1.60 - 2.40 mg/dL   CBC   Result Value Ref Range    WBC 8.5 4.4 - 11.3 x10*3/uL    nRBC 0.2 (H) 0.0 - 0.0 /100 WBCs    RBC 3.62 (L) 4.00 - 5.20 x10*6/uL    Hemoglobin 8.6 (L) 12.0 - 16.0 g/dL    Hematocrit 27.4 (L) 36.0 - 46.0 %    MCV 76 (L) 80 - 100 fL    MCH 23.8 (L) 26.0 - 34.0 pg    MCHC 31.4 (L) 32.0 - 36.0 g/dL    RDW 21.8 (H) 11.5 - 14.5 %    Platelets 129 (L) 150 - 450 x10*3/uL   Renal function panel   Result Value Ref Range    Glucose 104 (H) 74 - 99 mg/dL    Sodium 138 136 - 145 mmol/L    Potassium 3.4 (L) 3.5 - 5.3 mmol/L    Chloride 100 98 - 107 mmol/L    Bicarbonate 24 21 - 32 mmol/L    Anion Gap 17 10 - 20 mmol/L    Urea Nitrogen 52 (H) 6 - 23 mg/dL    Creatinine 3.16 (H) 0.50 - 1.05 mg/dL    eGFR 15 (L) >60 mL/min/1.73m*2    Calcium 7.7 (L) 8.6 - 10.6 mg/dL    Phosphorus 7.3 (H) 2.5 - 4.9 mg/dL    Albumin 3.0 (L) 3.4 - 5.0 g/dL   POCT GLUCOSE   Result Value Ref Range    POCT Glucose 91 74 - 99 mg/dL          ASSESSMENT:  Bhargavi Isidro is a 74 y.o. female with a past medical hx of A fib (on eliquis), group I/II/III pulm HTN (on remodulin and sildenafil), COPD, chronic hypoxic resp failure (5-6L at rest, 10L home O2 baseline), LEONEL, HFpEF heart block s/p pacemaker, CAD s/p CABG who is admitted for a  right-sided SDH and left pubic rami fracture c/b hematoma formation following a mechanical fall. S/p anticoag reversal with Andexxa. Pt is stable without neurosurgical or orthopedic intervention, now transferred to the MICU with high oxygen requirements. Nephrology consulted for acute kidney injury and hyponatremia. Ddx of DEBORA includes cardiorenal syndrome i/s/o pHTN and right-sided heart failure and ischemic ATN given hypotension.     Updates 7/8:  - Received 80 mg lasix at noon yesterday with good urine output  -BP stable without pressors  - Hyponatremia resolved and Cr downtrending    #DEBORA, Stage I on CKD III  - Baseline creatinine: 1.4-1.5  - Etiology: ischemic DEBORA I/s/o persistent hypotension  - 2.84 L  urine output last 24hrs, given 200 mg lasix overall yesterday  - UA showed:  2+ blood, leuk esterase, WBC's  - Urine electrolyes showed FeNA/FeUrea of 0.5% consistent prerenal. Urine electrolytes 7/6: Cortney 26, Uk 54, Ucr 53.3  - Cr Trend: 2.82 --> 3.15 --> 3.46 --> 3.63 --> 3.16  - FeUrea 34.5%  - Clinical volume status: euvolemic  - Electrolytes: hypocalcemia, hyperphosphatemia, hypokalemia  - Acid base status: Non-anion gap metabolic acidosis (based on ABG 7/7)    Hemodynamics  - Baseline blood pressure at /55-65.    #Hyponatremia (resolved)      Recommendations:  - Would limit IV fluids  - Continue supportive care  - Continue to diurese prn  - Maintain SBP > 100 as feasible  - strict Is/Os  - Avoid hypotension/rapid fluctuations in BPs    Obie Varela MD  Nephrology Resident  24 hour Renal Pager - [phone]    Discussed with attending nephrologist, Dr. Bhardwaj

## 2024-07-09 NOTE — PROGRESS NOTES
Medical Intensive Care - Daily Progress Note   Subjective    Bhargavi Isidro is a 74 y.o. year old female patient with a history of A-fib, CAD s/p CABG, COPD, group I/II/III pulmonary hypertension who [place] [facility] [place] a presumed [place] where was found down at home. Admitted on 7/4/2024 with following ICU needs: high O2 requirement     Interval History:  Overnight bhargavi had no major events. Goal diuresis over 24 hours was -500 to -1000 Ml. Lasix drip was stopped at 2000 but the patient continued to auto diurese and ended up with an output of negative 1.5 L approximately. She had no negative side effects and her ment ation has improved today also of note her creatine has fallen  to 3.16 from 3.62 yesterday.     Attempts were made to wean her off of her dopamine drip today but her blood pressures feel precipitously after these attempts with MAPs in the 40's and 50's. She was subsequently restarted on a dopamine drip.   Meds    Scheduled medications  acetaminophen, 975 mg, oral, TID  azelastine, 1 spray, Each Nostril, BID  cefTRIAXone, 1 g, intravenous, q24h  escitalopram, 10 mg, oral, Daily  furosemide, 40 mg, intravenous, Once  heparin (porcine), 5,000 Units, subcutaneous, q8h  lamoTRIgine, 100 mg, oral, Daily  levETIRAcetam, 500 mg, oral, BID  pantoprazole, 40 mg, oral, Daily before breakfast  perflutren protein A microsphere, 0.5 mL, intravenous, Once in imaging  predniSONE, 10 mg, oral, Daily  pregabalin, 75 mg, oral, BID  sildenafil, 20 mg, oral, BID  sulfur hexafluoride microsphr, 2 mL, intravenous, Once in imaging      Continuous medications  DOPamine, 1-5 mcg/kg/min, Last Rate: 4 mcg/kg/min (07/09/24 1806)  treprostinil, 39 ng/kg/min (Order-Specific), Last Rate: 39 ng/kg/min (07/09/24 1806)      PRN medications  PRN medications: HYDROmorphone, methocarbamol, ondansetron **OR** ondansetron, oxygen, polyethylene glycol     Objective    Blood pressure 99/57, pulse 72, temperature 36.2 °C  "(97.2 °F), resp. rate 15, height 1.651 m (5' 5\"), weight 69.4 kg (153 lb), SpO2 97%.     Physical Exam  Vitals and nursing note reviewed.   Constitutional:       General: She is not in acute distress.     Appearance: She is overweight.   HENT:      Head: Normocephalic and atraumatic.   Eyes:      General: Lids are normal. Vision grossly intact.      Extraocular Movements: Extraocular movements intact.   Cardiovascular:      Rate and Rhythm: Normal rate and regular rhythm.   Pulmonary:      Effort: Pulmonary effort is normal.      Breath sounds: Normal breath sounds.   Abdominal:      General: Bowel sounds are normal. There is no distension. There are no signs of injury.      Palpations: Abdomen is soft.      Tenderness: There is no abdominal tenderness.   Musculoskeletal:      Right lower leg: No edema.      Left lower leg: No edema.   Skin:     General: Skin is warm and dry.   Neurological:      Mental Status: She is alert and oriented to person, place, and time.      Motor: Tremor present.      Deep Tendon Reflexes: Babinski sign absent on the right side. Babinski sign absent on the left side.      Comments: Tremor historical   Notably less lethargic today             Intake/Output Summary (Last 24 hours) at 7/9/2024 1817  Last data filed at 7/9/2024 1806  Gross per 24 hour   Intake 1251.79 ml   Output 2230 ml   Net -978.21 ml     Labs:   Results from last 72 hours   Lab Units 07/09/24  0412 07/08/24  1737 07/08/24  0517   SODIUM mmol/L 138 133* 132*   POTASSIUM mmol/L 3.4* 3.6 4.1   CHLORIDE mmol/L 100 98 97*   CO2 mmol/L 24 22 20*   BUN mg/dL 52* 52* 52*   CREATININE mg/dL 3.16* 3.62* 3.62*   GLUCOSE mg/dL 104* 118* 94   CALCIUM mg/dL 7.7* 8.0* 7.3*   ANION GAP mmol/L 17 17 19   EGFR mL/min/1.73m*2 15* 13* 13*   PHOSPHORUS mg/dL 7.3* 7.7* 8.2*      Results from last 72 hours   Lab Units 07/09/24  0412 07/08/24  0517 07/07/24  0513   WBC AUTO x10*3/uL 8.5 11.2 15.0*  15.0*   HEMOGLOBIN g/dL 8.6* 8.4* 8.9*  8.9* "   HEMATOCRIT % 27.4* 26.9* 29.1*  29.1*   PLATELETS AUTO x10*3/uL 129* 131* 149*  149*   NEUTROS PCT AUTO %  --   --  87.8   LYMPHS PCT AUTO %  --   --  5.1   MONOS PCT AUTO %  --   --  5.3   EOS PCT AUTO %  --   --  0.4        Micro/ID:     Lab Results   Component Value Date    URINECULTURE No growth 07/05/2024    BLOODCULT No growth at 2 days 07/07/2024       Summary of key imaging results from the last 24 hours  CT HEAD WO IV CONTRAST; 7/6/2024 3:12 am   IMPRESSION:  No significant change in size of subdural hematoma overlying the posterior right cerebral convexity again measuring up to 1.1 cm in maximal thickness. The blood products are slightly less dense in several locations within the hematoma indicative of aging blood products.  Interpreted By: Jairo Toro,     XR CHEST 1 VIEW; 7/5/2024 5:33 pm   1.  Interval improvement in pulmonary edema.  2. Cardiomegaly versus pericardial effusion.  Interpreted By: David Jiang  Assessment and Plan     Assessment: Bhargavi Isidro is a 74 y.o. year old female patient admitted on 7/4/2024 with acute on chronic hypoxic respiratory failure. She has continued to have an altered mental status and complains of being tired.     Bhargavi did well yesterday and put out approximately 1.5 L of fluid. Diuresis goal was met and stopped but patient continued to auto diurese. Subsequently her creatine improved and he had no changes in her mental status or acute changes in comfort.     Mechanical Ventilation: none  Sedation/Analgesia:  none  Restraints: no    Summary for 07/09/24  :  Will continue with dopamine and diuresing with a goal of -500  if tolerated by the patient  Mental status improving today spending much more time awake  Did not tolerate attempts to wean off of dopamine     Plan:  NEUROLOGY/PSYCH:     Right sided subdural hematoma  ::SDH seen on CT scan following mechanical fall in the setting of Eliquis use for A-fib  ::Neurosurgery consulted, no operative  management  ::S/p reversal with Andexxa  Management:  -Neurosurgery signed off   -Repeat CT Head w/o contrast showed no significant changes in the size of the subdural hematoma  - Mental status significantly improved today is much more awake and reports feeling less tired  -Neuro checks q4  -Restarted DVT prophylaxis (heparin 5000 units) yesterday has had no acute changes in mental status      #Major depression  #Generalized anxiety disorder  Management:  -Holding Buspar in setting of renal failure  -d/c Lexapro (esciatalopram) 10mg  -C/w lamotrigine 100mg daily  -C/w Xanax 0.5mg daily PRN for anxiety     CARDIOVASCULAR:  #Heart failure with preserved ejection fraction  #Right sided heart failure  ::TTE (6/3/2024): LVEF of 55-60%, dilated RV with low normal RV systolic function and severe tricuspid regurgiation  ::TTE (04Jul24): LVEF of 73%, with LV remodeling, Severely enlarged RV, Severely dilated RA, Severe Tricuspid regurgitation   ::RV failure likely secondary to underlying pulmonary hypertension  ::Patient has received continuous drips of LR and D10W and has received intermittent boluses since being admitted  ::Home torsemide restarted but urine output has still been low  Management:  -Holding IV fluids  -goal of-500 fluids today will allow auto diuresis will support with lasix if needed      #Atrial fibrillation s/p ablation and PPM  ::CHADS-VASC of 4  ::S/p AV node ablation and PPM insertion  ::On Eliquis previously  Management:  -Eliquis held in setting of intracranial bleed. S/p Andexxa. Per Neurosurgery can restart on day 14 post-fall (fall occurred on July 4th)   -Maintained on telemetry, not on rate-control currently  -Current BP goal MAP of 65 or greater  - off of levophed on dopamine as sole pressor  -- Tried to wean dopamine today and patient had a significant pressor drop   --- restarted on dopamine at .45      #CAD s/p CABG  ::CABG done about 10 years ago  ::No heart cath data in her  chart  Management:  -Not on statin at home  -Per Neurosurgery must wait until post-fall day (fall on July 4th)  7 before starting ASA     PULMONARY:  #Pulmonary hypertension  #Right sided heart failure  ::Has been thought to have group I, II, and III pulmonary hypertension at various times  ::Most recent RHC (10/2022) significant for RAP 20 PAP 92/45 mPAP 61 PCWP 26 CO/CI (TD) 3.1/1.74 PVR 11.9 PaSat 57. Both pre and post-capillary hypertension  ::RV dilation in TTE as above  ::Patient prescribed 5-6LNC at rest, 10-15LNC on exertion but patient apparently only uses 6LNC and tolerates saturations in the 70s  ::Patient has been on remodulin for years, refuses to use anything besides her own dilutions in her own cassette  Management:  -Holding fluids, continuing to diurese with lasix   -c/w sildenafil 20mg BID   -Remodulin dosing modified to remodulin 39ng/kg/min-no more titration-will refer for premixed cassettes   -C/w prednisone 10mg daily. This is a chronic medication for previously suspected organizing pneumonia     RENAL/GENITOURINARY:  #Acute on chronic kidney injury, stage I, oliguric  ::Baseline creatinine of around 1.4-1.5  ::FENa 0.4% indicating prerenal DEBORA  ::Renal injury possibly secondary to cardiorenal syndrome as it has worsened despite fluid administration  ::Nephrotoxic medications include Protonix  Management:  -Renal U/S results indicative of chronic kidney disease no acute findings  -Continue to monitor daily RFPs  -Abbott in place, strict I/O  -Avoid further nephrotoxic medications  -Nephrology consulted appreciate rec's   -FeUrea 23.8% suggests pre-renal disease  - Creatine down trending was 3.62 ([id number]) down to 3.16 today (([id number])  -Last POCT PH 7.32 from [facility] ([id number])    #Hyponatremia (Resolved)  ::Likely hypervolemic in etiology  Management:  -Consult placed to Nephro appreciate rec's   -will continue to diurese as noted above  -Continue daily RFPs  - Sodium today 138       GASTROENTEROLOGY:  No acute concerns     INFECTIOUS DISEASE:  #Urinary tract infection  ::No symptoms of dysuria but symptoms of reduced concentration and fatigue  ::UA showing 11-20 WBC, 25 LE  -Ceftriaxone 1g daily 5 day course (06Jul-10Jul)  - Xray chest [id number] showed no new infection  - blood cultures drawn [id number] have not yet shown growth   - Urine cultures drawn [id number] have not yet shown growth      HEMATOLOGY:  No acute concerns     MUSCULOSKELETAL/ SKIN:  #Left inferior pelvic ramus fracture  #Pelvic hematoma  ::Occurred after fall  ::No operative management per Ortho, no need for IR embolization  Management:  -PT/OT consult  -Per Ortho, if she fails trial of ambulation she may be a candidate for operative intervention for pelvic pain relief  -Pain regimen: Tylenol 975mg TID, PRN Dilaudid 0.4mg q3h PRN, Lyrica 75mg BID, Robadin 500mg BID PRN    ICU Check List       FEN  Fluids: Hold  Electrolytes: K+  Nutrition: Regular  Drips: Dopamine  Prophylaxis:  DVT ppx: Heparin, Has refused SCD's   GI ppx: Protonix  Bowel care: Miralax PRN,   Hardware:  Catheter: Saunders  Access: PIV  Drains: Abbott  Lines: None  Social:  Code: DO NOT INTUBATE  HPOA: Jaleesa Rondon (friend) [phone]   Disposition: ICU while on Pressors    Herman Ruggiero MD PGY-1  07/09/24 at 6:17 PM     Disclaimer: Documentation completed with the information available at the time of input. The times in the chart may not be reflective of actual patient care times, interventions, or procedures. Documentation occurs after the physical care of the patient.

## 2024-07-10 ENCOUNTER — APPOINTMENT (OUTPATIENT)
Dept: CARDIOLOGY | Facility: HOSPITAL | Age: 74
DRG: 963 | End: 2024-07-10
Payer: MEDICARE

## 2024-07-10 LAB
ALBUMIN SERPL BCP-MCNC: 2.9 G/DL (ref 3.4–5)
ALBUMIN SERPL BCP-MCNC: 3 G/DL (ref 3.4–5)
ANION GAP SERPL CALC-SCNC: 14 MMOL/L (ref 10–20)
ANION GAP SERPL CALC-SCNC: 14 MMOL/L (ref 10–20)
ATRIAL RATE: 70 BPM
BUN SERPL-MCNC: 44 MG/DL (ref 6–23)
BUN SERPL-MCNC: 49 MG/DL (ref 6–23)
CALCIUM SERPL-MCNC: 7.6 MG/DL (ref 8.6–10.6)
CALCIUM SERPL-MCNC: 7.7 MG/DL (ref 8.6–10.6)
CHLORIDE SERPL-SCNC: 101 MMOL/L (ref 98–107)
CHLORIDE SERPL-SCNC: 103 MMOL/L (ref 98–107)
CO2 SERPL-SCNC: 24 MMOL/L (ref 21–32)
CO2 SERPL-SCNC: 25 MMOL/L (ref 21–32)
CREAT SERPL-MCNC: 1.91 MG/DL (ref 0.5–1.05)
CREAT SERPL-MCNC: 2.52 MG/DL (ref 0.5–1.05)
EGFRCR SERPLBLD CKD-EPI 2021: 20 ML/MIN/1.73M*2
EGFRCR SERPLBLD CKD-EPI 2021: 27 ML/MIN/1.73M*2
ERYTHROCYTE [DISTWIDTH] IN BLOOD BY AUTOMATED COUNT: 22.2 % (ref 11.5–14.5)
GLUCOSE SERPL-MCNC: 123 MG/DL (ref 74–99)
GLUCOSE SERPL-MCNC: 89 MG/DL (ref 74–99)
HCT VFR BLD AUTO: 28.2 % (ref 36–46)
HGB BLD-MCNC: 8.7 G/DL (ref 12–16)
MAGNESIUM SERPL-MCNC: 2.09 MG/DL (ref 1.6–2.4)
MCH RBC QN AUTO: 23.7 PG (ref 26–34)
MCHC RBC AUTO-ENTMCNC: 30.9 G/DL (ref 32–36)
MCV RBC AUTO: 77 FL (ref 80–100)
NRBC BLD-RTO: 0.2 /100 WBCS (ref 0–0)
P OFFSET: 223 MS
P ONSET: 173 MS
PHOSPHATE SERPL-MCNC: 2.5 MG/DL (ref 2.5–4.9)
PHOSPHATE SERPL-MCNC: 4.5 MG/DL (ref 2.5–4.9)
PLATELET # BLD AUTO: 125 X10*3/UL (ref 150–450)
POTASSIUM SERPL-SCNC: 3.2 MMOL/L (ref 3.5–5.3)
POTASSIUM SERPL-SCNC: 4 MMOL/L (ref 3.5–5.3)
PR INTERVAL: 110 MS
Q ONSET: 228 MS
QRS COUNT: 11 BEATS
QRS DURATION: 100 MS
QT INTERVAL: 402 MS
QTC CALCULATION(BAZETT): 434 MS
QTC FREDERICIA: 423 MS
R AXIS: -46 DEGREES
RBC # BLD AUTO: 3.67 X10*6/UL (ref 4–5.2)
SODIUM SERPL-SCNC: 137 MMOL/L (ref 136–145)
SODIUM SERPL-SCNC: 137 MMOL/L (ref 136–145)
T AXIS: 145 DEGREES
T OFFSET: 429 MS
VENTRICULAR RATE: 70 BPM
WBC # BLD AUTO: 8.9 X10*3/UL (ref 4.4–11.3)

## 2024-07-10 PROCEDURE — 80069 RENAL FUNCTION PANEL: CPT

## 2024-07-10 PROCEDURE — 37799 UNLISTED PX VASCULAR SURGERY: CPT | Performed by: NURSE PRACTITIONER

## 2024-07-10 PROCEDURE — 2500000001 HC RX 250 WO HCPCS SELF ADMINISTERED DRUGS (ALT 637 FOR MEDICARE OP)

## 2024-07-10 PROCEDURE — 2500000005 HC RX 250 GENERAL PHARMACY W/O HCPCS: Performed by: STUDENT IN AN ORGANIZED HEALTH CARE EDUCATION/TRAINING PROGRAM

## 2024-07-10 PROCEDURE — 2500000004 HC RX 250 GENERAL PHARMACY W/ HCPCS (ALT 636 FOR OP/ED)

## 2024-07-10 PROCEDURE — 85027 COMPLETE CBC AUTOMATED: CPT

## 2024-07-10 PROCEDURE — 93279 PRGRMG DEV EVAL PM/LDLS PM: CPT

## 2024-07-10 PROCEDURE — 93279 PRGRMG DEV EVAL PM/LDLS PM: CPT | Performed by: INTERNAL MEDICINE

## 2024-07-10 PROCEDURE — 99291 CRITICAL CARE FIRST HOUR: CPT | Performed by: NURSE PRACTITIONER

## 2024-07-10 PROCEDURE — 84100 ASSAY OF PHOSPHORUS: CPT | Performed by: NURSE PRACTITIONER

## 2024-07-10 PROCEDURE — 2500000001 HC RX 250 WO HCPCS SELF ADMINISTERED DRUGS (ALT 637 FOR MEDICARE OP): Performed by: NURSE PRACTITIONER

## 2024-07-10 PROCEDURE — 83735 ASSAY OF MAGNESIUM: CPT

## 2024-07-10 PROCEDURE — 80069 RENAL FUNCTION PANEL: CPT | Performed by: NURSE PRACTITIONER

## 2024-07-10 PROCEDURE — 2500000002 HC RX 250 W HCPCS SELF ADMINISTERED DRUGS (ALT 637 FOR MEDICARE OP, ALT 636 FOR OP/ED)

## 2024-07-10 PROCEDURE — 94660 CPAP INITIATION&MGMT: CPT

## 2024-07-10 PROCEDURE — 1100000001 HC PRIVATE ROOM DAILY

## 2024-07-10 PROCEDURE — 2500000004 HC RX 250 GENERAL PHARMACY W/ HCPCS (ALT 636 FOR OP/ED): Performed by: NURSE PRACTITIONER

## 2024-07-10 PROCEDURE — 37799 UNLISTED PX VASCULAR SURGERY: CPT

## 2024-07-10 RX ORDER — POTASSIUM CHLORIDE 14.9 MG/ML
20 INJECTION INTRAVENOUS ONCE
Status: COMPLETED | OUTPATIENT
Start: 2024-07-10 | End: 2024-07-10

## 2024-07-10 RX ORDER — FUROSEMIDE 10 MG/ML
40 INJECTION INTRAMUSCULAR; INTRAVENOUS ONCE
Status: COMPLETED | OUTPATIENT
Start: 2024-07-10 | End: 2024-07-10

## 2024-07-10 RX ORDER — POTASSIUM CHLORIDE 1.5 G/1.58G
40 POWDER, FOR SOLUTION ORAL ONCE
Status: COMPLETED | OUTPATIENT
Start: 2024-07-10 | End: 2024-07-10

## 2024-07-10 RX ORDER — POTASSIUM CHLORIDE 20 MEQ/1
40 TABLET, EXTENDED RELEASE ORAL ONCE
Status: DISCONTINUED | OUTPATIENT
Start: 2024-07-10 | End: 2024-07-10

## 2024-07-10 RX ADMIN — FUROSEMIDE 40 MG: 10 INJECTION, SOLUTION INTRAMUSCULAR; INTRAVENOUS at 04:05

## 2024-07-10 RX ADMIN — ESCITALOPRAM 10 MG: 10 TABLET, FILM COATED ORAL at 09:29

## 2024-07-10 RX ADMIN — ACETAMINOPHEN 975 MG: 325 TABLET ORAL at 21:16

## 2024-07-10 RX ADMIN — PREDNISONE 10 MG: 20 TABLET ORAL at 09:29

## 2024-07-10 RX ADMIN — HEPARIN SODIUM 5000 UNITS: 5000 INJECTION INTRAVENOUS; SUBCUTANEOUS at 21:16

## 2024-07-10 RX ADMIN — Medication 10 L/MIN: at 15:05

## 2024-07-10 RX ADMIN — CEFTRIAXONE SODIUM 1 G: 1 INJECTION, SOLUTION INTRAVENOUS at 00:33

## 2024-07-10 RX ADMIN — HEPARIN SODIUM 5000 UNITS: 5000 INJECTION INTRAVENOUS; SUBCUTANEOUS at 04:13

## 2024-07-10 RX ADMIN — AZELASTINE HYDROCHLORIDE 1 SPRAY: 137 SPRAY, METERED NASAL at 09:30

## 2024-07-10 RX ADMIN — LEVETIRACETAM 500 MG: 500 TABLET, FILM COATED ORAL at 09:29

## 2024-07-10 RX ADMIN — POTASSIUM CHLORIDE 40 MEQ: 1.5 POWDER, FOR SOLUTION ORAL at 09:29

## 2024-07-10 RX ADMIN — ACETAMINOPHEN 975 MG: 325 TABLET ORAL at 09:29

## 2024-07-10 RX ADMIN — SILDENAFIL 20 MG: 20 TABLET ORAL at 09:29

## 2024-07-10 RX ADMIN — HEPARIN SODIUM 5000 UNITS: 5000 INJECTION INTRAVENOUS; SUBCUTANEOUS at 15:05

## 2024-07-10 RX ADMIN — PREGABALIN 75 MG: 25 CAPSULE ORAL at 21:17

## 2024-07-10 RX ADMIN — SILDENAFIL 20 MG: 20 TABLET ORAL at 21:17

## 2024-07-10 RX ADMIN — POTASSIUM CHLORIDE 20 MEQ: 14.9 INJECTION, SOLUTION INTRAVENOUS at 09:29

## 2024-07-10 RX ADMIN — DOPAMINE HYDROCHLORIDE 4 MCG/KG/MIN: 160 INJECTION, SOLUTION INTRAVENOUS at 05:31

## 2024-07-10 RX ADMIN — LEVETIRACETAM 500 MG: 500 TABLET, FILM COATED ORAL at 21:16

## 2024-07-10 RX ADMIN — LAMOTRIGINE 100 MG: 100 TABLET ORAL at 09:29

## 2024-07-10 RX ADMIN — PANTOPRAZOLE SODIUM 40 MG: 40 TABLET, DELAYED RELEASE ORAL at 09:37

## 2024-07-10 RX ADMIN — PREGABALIN 75 MG: 25 CAPSULE ORAL at 09:29

## 2024-07-10 ASSESSMENT — PAIN SCALES - GENERAL
PAINLEVEL_OUTOF10: 0 - NO PAIN

## 2024-07-10 ASSESSMENT — PAIN - FUNCTIONAL ASSESSMENT
PAIN_FUNCTIONAL_ASSESSMENT: 0-10

## 2024-07-10 ASSESSMENT — COGNITIVE AND FUNCTIONAL STATUS - GENERAL
STANDING UP FROM CHAIR USING ARMS: TOTAL
PERSONAL GROOMING: A LOT
DRESSING REGULAR UPPER BODY CLOTHING: A LOT
TOILETING: A LOT
WALKING IN HOSPITAL ROOM: TOTAL
HELP NEEDED FOR BATHING: A LOT
EATING MEALS: A LITTLE
CLIMB 3 TO 5 STEPS WITH RAILING: TOTAL
DAILY ACTIVITIY SCORE: 12
DRESSING REGULAR LOWER BODY CLOTHING: TOTAL
TURNING FROM BACK TO SIDE WHILE IN FLAT BAD: A LOT
MOBILITY SCORE: 8
MOVING TO AND FROM BED TO CHAIR: TOTAL
MOVING FROM LYING ON BACK TO SITTING ON SIDE OF FLAT BED WITH BEDRAILS: A LOT

## 2024-07-10 NOTE — CARE PLAN
Problem: Pain - Adult  Goal: Verbalizes/displays adequate comfort level or baseline comfort level  Outcome: Progressing     Problem: Safety - Adult  Goal: Free from fall injury  Outcome: Progressing     Problem: Discharge Planning  Goal: Discharge to home or other facility with appropriate resources  Outcome: Progressing     Problem: Skin  Goal: Decreased wound size/increased tissue granulation at next dressing change  Outcome: Progressing  Flowsheets (Taken 7/9/2024 2241)  Decreased wound size/increased tissue granulation at next dressing change:   Promote sleep for wound healing   Protective dressings over bony prominences  Goal: Participates in plan/prevention/treatment measures  Outcome: Progressing  Flowsheets (Taken 7/9/2024 2241)  Participates in plan/prevention/treatment measures:   Discuss with provider PT/OT consult   Elevate heels  Goal: Prevent/manage excess moisture  Outcome: Progressing  Flowsheets (Taken 7/9/2024 2241)  Prevent/manage excess moisture:   Cleanse incontinence/protect with barrier cream   Moisturize dry skin   Follow provider orders for dressing changes  Goal: Prevent/minimize sheer/friction injuries  Outcome: Progressing  Flowsheets (Taken 7/9/2024 2241)  Prevent/minimize sheer/friction injuries:   Complete micro-shifts as needed if patient unable. Adjust patient position to relieve pressure points, not a full turn   Increase activity/out of bed for meals   HOB 30 degrees or less   Turn/reposition every 2 hours/use positioning/transfer devices   Use pull sheet  Goal: Promote/optimize nutrition  Outcome: Progressing  Flowsheets (Taken 7/9/2024 2241)  Promote/optimize nutrition:   Assist with feeding   Consume > 50% meals/supplements   Offer water/supplements/favorite foods   Monitor/record intake including meals  Goal: Promote skin healing  Outcome: Progressing  Flowsheets (Taken 7/9/2024 2241)  Promote skin healing:   Protective dressings over bony prominences   Turn/reposition every  2 hours/use positioning/transfer devices       The clinical goals for the shift include wean pressures as tolerated for a MAP goal 60-70

## 2024-07-10 NOTE — PROGRESS NOTES
"Medical Intensive Care - Daily Progress Note   Subjective    Bhargavi Isidro is a 74 y.o. year old female patient admitted on 7/4/2024 with following ICU needs: dopamine gtt    Interval History:  Able to diurese successfully while on dopamine    Meds    Scheduled medications  acetaminophen, 975 mg, oral, TID  azelastine, 1 spray, Each Nostril, BID  cefTRIAXone, 1 g, intravenous, q24h  escitalopram, 10 mg, oral, Daily  heparin (porcine), 5,000 Units, subcutaneous, q8h  lamoTRIgine, 100 mg, oral, Daily  levETIRAcetam, 500 mg, oral, BID  pantoprazole, 40 mg, oral, Daily before breakfast  perflutren protein A microsphere, 0.5 mL, intravenous, Once in imaging  predniSONE, 10 mg, oral, Daily  pregabalin, 75 mg, oral, BID  sildenafil, 20 mg, oral, BID  sulfur hexafluoride microsphr, 2 mL, intravenous, Once in imaging      Continuous medications  DOPamine, 1-5 mcg/kg/min, Last Rate: 3.5 mcg/kg/min (07/10/24 0735)  treprostinil, 39 ng/kg/min (Order-Specific), Last Rate: 39 ng/kg/min (07/10/24 0600)      PRN medications  PRN medications: HYDROmorphone, methocarbamol, ondansetron **OR** ondansetron, oxygen, polyethylene glycol, witch hazel     Objective    Blood pressure 109/51, pulse 90, temperature 35.3 °C (95.5 °F), resp. rate (!) 9, height 1.651 m (5' 5\"), weight 73.2 kg (161 lb 6 oz), SpO2 95%.     Physical Exam  Constitutional:       Appearance: Normal appearance. She is normal weight.   HENT:      Head: Normocephalic.      Comments: Left sided facial hematoma  Eyes:      Conjunctiva/sclera: Conjunctivae normal.   Cardiovascular:      Rate and Rhythm: Normal rate and regular rhythm.      Heart sounds: Normal heart sounds.   Pulmonary:      Effort: Pulmonary effort is normal.      Breath sounds: Normal breath sounds.   Abdominal:      General: Abdomen is flat. Bowel sounds are normal.      Palpations: Abdomen is soft.   Genitourinary:     Comments: martinez  Musculoskeletal:      Cervical back: Neck supple.   Skin:     " General: Skin is warm.      Capillary Refill: Capillary refill takes less than 2 seconds.   Neurological:      Mental Status: She is alert and oriented to person, place, and time.            Intake/Output Summary (Last 24 hours) at 7/10/2024 1511  Last data filed at 7/10/2024 1400  Gross per 24 hour   Intake 566.03 ml   Output 1880 ml   Net -1313.97 ml     Labs:   Results from last 72 hours   Lab Units 07/10/24  0412 07/09/24  0412 07/08/24  1737   SODIUM mmol/L 137 138 133*   POTASSIUM mmol/L 3.2* 3.4* 3.6   CHLORIDE mmol/L 101 100 98   CO2 mmol/L 25 24 22   BUN mg/dL 49* 52* 52*   CREATININE mg/dL 2.52* 3.16* 3.62*   GLUCOSE mg/dL 89 104* 118*   CALCIUM mg/dL 7.6* 7.7* 8.0*   ANION GAP mmol/L 14 17 17   EGFR mL/min/1.73m*2 20* 15* 13*   PHOSPHORUS mg/dL 4.5 7.3* 7.7*      Results from last 72 hours   Lab Units 07/10/24  0412 07/09/24  0412 07/08/24  0517   WBC AUTO x10*3/uL 8.9 8.5 11.2   HEMOGLOBIN g/dL 8.7* 8.6* 8.4*   HEMATOCRIT % 28.2* 27.4* 26.9*   PLATELETS AUTO x10*3/uL 125* 129* 131*        Micro/ID:     Lab Results   Component Value Date    URINECULTURE No growth 07/05/2024    BLOODCULT No growth at 3 days 07/07/2024       Summary of key imaging results from the last 24 hours  NA    Assessment and Plan     Assessment: Bhargavi Isidro is a 74 y.o. year old female patient admitted on 7/4/2024 with acute on chronic respiratory failure follwing mechanical fall resulting in SDH, pelvic fracture, pelvic hematoma.    Mechanical Ventilation: none  Sedation/Analgesia:  none  Restraints: no    Summary for 07/10/24  :  Increaesd PPM to rate of 90bpm, weaned off dopamine    Plan:  NEUROLOGY/PSYCH:  Dx:  SDH from mechanical fall with Eliquis, s/p andexxa.  MDD, SEBASTIAN.  No surgical interventions  Management:  Hold home buspar  Ok for DVT ppx, restart Eliquis 14 days post fall  Cont. Lamotrigine, lexapro    CARDIOVASCULAR:  Dx:  Hx of A-fib, HFpEF.   Distant CABG.  RV dilation, TTE.  PPM.  Hypotension, unable to wean  dopamine.  Management:  Increase PPM rate to 90  Wean off dopamine  Goal net neg 500ml    PULMONARY:  Dx:  Hx of pulm hypertension on sildenafil and Remodulin.  Acute on chronic respiratory failure.  On 10 L HF NC, baseline is 5L  Management:  Cont. Diuresis  Cont. Sildenafil, remodulin    RENAL/GENITOURINARY:  Dx:  DEBORA on CKD  Prerenal  Management:  Cont. Diuresis  Keep martinez for now  Strict I/Os  Daiy RFP    GASTROENTEROLOGY:  Dx:  No issues    ENDOCRINOLOGY:  Dx:  no issues     HEMATOLOGY:  Dx:  S/p andexxa  Management:  Resume Eliquis 14 days post fall    MUSCULOSKELETAL/ SKIN:  Dx:  Hx of arthritis  Had previously been on prednisone for organizing pneumonia? Since ~2022, outpatient notes unable to taper due to joint pain on last note  Management:  prednisone    INFECTIOUS DISEASE:  Dx:  No issues  Management:  Culture if febrile    ICU Check List     FEN  Fluids: none  Electrolytes: repleting   Nutrition: low sodium  Prophylaxis:  DVT ppx: hep sq  GI ppx: ppi  Bowel care: PRN miralax  Hardware:  Catheter: Martinez  Access: PIVS  Drains: none  Lines: maldonado R chest wall  Social:  Code: DNR and No Intubation    HPOA: Jaleesa Rondon (friend) [phone]   Disposition: ICU    ALEXA Quick   07/10/24 at 3:11 PM     Disclaimer: Documentation completed with the information available at the time of input. The times in the chart may not be reflective of actual patient care times, interventions, or procedures. Documentation occurs after the physical care of the patient.

## 2024-07-10 NOTE — CARE PLAN
Bhargavi Isidro is a 74 y.o. female with a who was admitted for a right-sided SDH and left pubic rami fracture c/b hematoma formation following a mechanical fall.  Transferred to the MICU with high oxygen requirements. Nephrology consulted for acute kidney injury and hyponatremia likely due to CRS physiology and ischemic ATN due to hypotension.     Pts baseline Cr is ~1.5 and with supportive management UOP and renal function continue to improve.   Would continue to hold diuresis.       Will sign off at this time.     Macarena Zaragoza, DO  PGY 5 Nephrology Fellow

## 2024-07-10 NOTE — PROGRESS NOTES
Physical Therapy                 Therapy Communication Note    Patient Name: Bhargavi Isidro  MRN: [id number]  Today's Date: 7/10/2024     Discipline: Physical Therapy    Missed Visit Reason: Missed Visit Reason:  (patient declined, wanting to rest, despite encouragement, patient continued to decline. PT will re-attempt as time and schedule allows and as medically appropriate.)    Missed Time: Attempt    Comment:

## 2024-07-11 ENCOUNTER — APPOINTMENT (OUTPATIENT)
Dept: VASCULAR MEDICINE | Facility: HOSPITAL | Age: 74
DRG: 963 | End: 2024-07-11
Payer: MEDICARE

## 2024-07-11 LAB — BACTERIA BLD CULT: NORMAL

## 2024-07-11 PROCEDURE — 99233 SBSQ HOSP IP/OBS HIGH 50: CPT | Performed by: INTERNAL MEDICINE

## 2024-07-11 PROCEDURE — 2500000001 HC RX 250 WO HCPCS SELF ADMINISTERED DRUGS (ALT 637 FOR MEDICARE OP)

## 2024-07-11 PROCEDURE — 2500000004 HC RX 250 GENERAL PHARMACY W/ HCPCS (ALT 636 FOR OP/ED)

## 2024-07-11 PROCEDURE — 2500000005 HC RX 250 GENERAL PHARMACY W/O HCPCS

## 2024-07-11 PROCEDURE — 1200000002 HC GENERAL ROOM WITH TELEMETRY DAILY

## 2024-07-11 PROCEDURE — 93970 EXTREMITY STUDY: CPT | Performed by: INTERNAL MEDICINE

## 2024-07-11 PROCEDURE — 2500000002 HC RX 250 W HCPCS SELF ADMINISTERED DRUGS (ALT 637 FOR MEDICARE OP, ALT 636 FOR OP/ED)

## 2024-07-11 PROCEDURE — 93970 EXTREMITY STUDY: CPT

## 2024-07-11 PROCEDURE — 97530 THERAPEUTIC ACTIVITIES: CPT | Mod: GP

## 2024-07-11 RX ORDER — LOPERAMIDE HYDROCHLORIDE 2 MG/1
2 CAPSULE ORAL 3 TIMES DAILY PRN
Status: DISCONTINUED | OUTPATIENT
Start: 2024-07-11 | End: 2024-07-17 | Stop reason: HOSPADM

## 2024-07-11 RX ORDER — ASPIRIN 81 MG/1
81 TABLET ORAL DAILY
Status: DISCONTINUED | OUTPATIENT
Start: 2024-07-11 | End: 2024-07-17 | Stop reason: HOSPADM

## 2024-07-11 RX ADMIN — PANTOPRAZOLE SODIUM 40 MG: 40 TABLET, DELAYED RELEASE ORAL at 06:13

## 2024-07-11 RX ADMIN — WITCH HAZEL 1 EACH: 500 SOLUTION RECTAL; TOPICAL at 01:13

## 2024-07-11 RX ADMIN — HYDROMORPHONE HYDROCHLORIDE 0.4 MG: 1 INJECTION, SOLUTION INTRAMUSCULAR; INTRAVENOUS; SUBCUTANEOUS at 20:28

## 2024-07-11 RX ADMIN — LAMOTRIGINE 100 MG: 100 TABLET ORAL at 09:25

## 2024-07-11 RX ADMIN — HYDROMORPHONE HYDROCHLORIDE 0.4 MG: 1 INJECTION, SOLUTION INTRAMUSCULAR; INTRAVENOUS; SUBCUTANEOUS at 09:26

## 2024-07-11 RX ADMIN — ACETAMINOPHEN 975 MG: 325 TABLET ORAL at 20:27

## 2024-07-11 RX ADMIN — TREPROSTINIL 39 NG/KG/MIN: 5 INJECTION, SOLUTION INTRAVENOUS; SUBCUTANEOUS at 21:11

## 2024-07-11 RX ADMIN — PREGABALIN 75 MG: 25 CAPSULE ORAL at 09:26

## 2024-07-11 RX ADMIN — PREGABALIN 75 MG: 25 CAPSULE ORAL at 20:27

## 2024-07-11 RX ADMIN — HEPARIN SODIUM 5000 UNITS: 5000 INJECTION INTRAVENOUS; SUBCUTANEOUS at 06:13

## 2024-07-11 RX ADMIN — HEPARIN SODIUM 5000 UNITS: 5000 INJECTION INTRAVENOUS; SUBCUTANEOUS at 20:28

## 2024-07-11 RX ADMIN — LOPERAMIDE HYDROCHLORIDE 2 MG: 2 CAPSULE ORAL at 09:32

## 2024-07-11 RX ADMIN — LEVETIRACETAM 500 MG: 500 TABLET, FILM COATED ORAL at 20:28

## 2024-07-11 RX ADMIN — HYDROMORPHONE HYDROCHLORIDE 0.4 MG: 1 INJECTION, SOLUTION INTRAMUSCULAR; INTRAVENOUS; SUBCUTANEOUS at 15:45

## 2024-07-11 RX ADMIN — TREPROSTINIL 39 NG/KG/MIN: 5 INJECTION, SOLUTION INTRAVENOUS; SUBCUTANEOUS at 01:01

## 2024-07-11 RX ADMIN — LEVETIRACETAM 500 MG: 500 TABLET, FILM COATED ORAL at 09:25

## 2024-07-11 RX ADMIN — LOPERAMIDE HYDROCHLORIDE 2 MG: 2 CAPSULE ORAL at 20:27

## 2024-07-11 RX ADMIN — ACETAMINOPHEN 975 MG: 325 TABLET ORAL at 09:25

## 2024-07-11 RX ADMIN — AZELASTINE HYDROCHLORIDE 1 SPRAY: 137 SPRAY, METERED NASAL at 09:26

## 2024-07-11 RX ADMIN — SILDENAFIL 20 MG: 20 TABLET ORAL at 09:25

## 2024-07-11 RX ADMIN — HEPARIN SODIUM 5000 UNITS: 5000 INJECTION INTRAVENOUS; SUBCUTANEOUS at 13:25

## 2024-07-11 RX ADMIN — ESCITALOPRAM 10 MG: 10 TABLET, FILM COATED ORAL at 09:33

## 2024-07-11 RX ADMIN — SILDENAFIL 20 MG: 20 TABLET ORAL at 20:28

## 2024-07-11 RX ADMIN — ASPIRIN 81 MG: 81 TABLET, COATED ORAL at 13:25

## 2024-07-11 RX ADMIN — AZELASTINE HYDROCHLORIDE 1 SPRAY: 137 SPRAY, METERED NASAL at 20:36

## 2024-07-11 RX ADMIN — CEFTRIAXONE SODIUM 1 G: 1 INJECTION, SOLUTION INTRAVENOUS at 01:12

## 2024-07-11 RX ADMIN — PREDNISONE 10 MG: 20 TABLET ORAL at 09:25

## 2024-07-11 RX ADMIN — ACETAMINOPHEN 975 MG: 325 TABLET ORAL at 15:45

## 2024-07-11 ASSESSMENT — PAIN DESCRIPTION - LOCATION
LOCATION: HIP
LOCATION: GENERALIZED

## 2024-07-11 ASSESSMENT — COGNITIVE AND FUNCTIONAL STATUS - GENERAL
MOVING FROM LYING ON BACK TO SITTING ON SIDE OF FLAT BED WITH BEDRAILS: A LOT
TURNING FROM BACK TO SIDE WHILE IN FLAT BAD: A LOT
TOILETING: A LOT
MOVING FROM LYING ON BACK TO SITTING ON SIDE OF FLAT BED WITH BEDRAILS: A LOT
MOVING TO AND FROM BED TO CHAIR: TOTAL
PERSONAL GROOMING: A LOT
STANDING UP FROM CHAIR USING ARMS: TOTAL
DRESSING REGULAR LOWER BODY CLOTHING: TOTAL
TOILETING: A LOT
HELP NEEDED FOR BATHING: A LOT
DRESSING REGULAR LOWER BODY CLOTHING: TOTAL
MOBILITY SCORE: 11
MOVING FROM LYING ON BACK TO SITTING ON SIDE OF FLAT BED WITH BEDRAILS: A LOT
HELP NEEDED FOR BATHING: A LOT
STANDING UP FROM CHAIR USING ARMS: A LOT
DRESSING REGULAR UPPER BODY CLOTHING: A LOT
EATING MEALS: A LITTLE
TURNING FROM BACK TO SIDE WHILE IN FLAT BAD: A LOT
CLIMB 3 TO 5 STEPS WITH RAILING: TOTAL
MOVING TO AND FROM BED TO CHAIR: TOTAL
DRESSING REGULAR UPPER BODY CLOTHING: A LOT
TURNING FROM BACK TO SIDE WHILE IN FLAT BAD: A LOT
WALKING IN HOSPITAL ROOM: A LOT
DAILY ACTIVITIY SCORE: 12
CLIMB 3 TO 5 STEPS WITH RAILING: TOTAL
EATING MEALS: A LITTLE
MOBILITY SCORE: 8
WALKING IN HOSPITAL ROOM: TOTAL
MOVING TO AND FROM BED TO CHAIR: A LOT
STANDING UP FROM CHAIR USING ARMS: TOTAL
CLIMB 3 TO 5 STEPS WITH RAILING: TOTAL
PERSONAL GROOMING: A LOT
WALKING IN HOSPITAL ROOM: TOTAL

## 2024-07-11 ASSESSMENT — PAIN SCALES - GENERAL
PAINLEVEL_OUTOF10: 5 - MODERATE PAIN
PAINLEVEL_OUTOF10: 10 - WORST POSSIBLE PAIN
PAINLEVEL_OUTOF10: 6
PAINLEVEL_OUTOF10: 6
PAINLEVEL_OUTOF10: 5 - MODERATE PAIN
PAINLEVEL_OUTOF10: 10 - WORST POSSIBLE PAIN

## 2024-07-11 ASSESSMENT — PAIN - FUNCTIONAL ASSESSMENT
PAIN_FUNCTIONAL_ASSESSMENT: 0-10

## 2024-07-11 ASSESSMENT — PAIN DESCRIPTION - ORIENTATION: ORIENTATION: LEFT

## 2024-07-11 NOTE — SIGNIFICANT EVENT
Floor Readiness Note       I, personally, evaluated Bhargavi Isidro prior to transfer to the floor, including reviewing all current laboratory and imaging studies. The patient remains appropriate for transfer to the floor. Bedside nurse and respiratory therapy are also in agreement of patient's readiness for the floor.     Brief summary:  Bhargavi Isidro is a 74 y.o. female who was admitted to the MICU on 7/5 for acute on chronic hypoxic respiratory failure due to pulmonary edema in the setting of right heart failure and pulmonary hypertension. They have been treated with diuresis and are now back on their baseline oxygen.     Updated focused Physical Exam:  Physical Exam  Constitutional:       Appearance: Normal appearance. She is normal weight.   HENT:      Head: Normocephalic.      Comments: Left sided facial hematoma  Eyes:      Conjunctiva/sclera: Conjunctivae normal.   Cardiovascular:      Rate and Rhythm: Normal rate and regular rhythm.      Heart sounds: Normal heart sounds.   Pulmonary:      Effort: Pulmonary effort is normal.      Breath sounds: Normal breath sounds.   Abdominal:      General: Abdomen is flat. Bowel sounds are normal.      Palpations: Abdomen is soft.   Genitourinary:     Comments: martinez  Musculoskeletal:      Cervical back: Neck supple.   Skin:     General: Skin is warm.      Capillary Refill: Capillary refill takes less than 2 seconds.   Neurological:      Mental Status: She is alert and oriented to person, place, and time.     Current Vital Signs:  Heart Rate: 90 (07/10/24 2100 : Spencer Jimenez RN)  BP: 110/58 (07/10/24 2100 : Spencer Jimenez, ABILIO)  Temp: 36.2 °C (97.2 °F) (07/10/24 2000 : Segun Reynaga)  Resp: 15 (07/10/24 2100 : Spencer Jimenez, ABILIO)  SpO2: 100 % (07/10/24 2100 : Spencer Jimenez, ABILIO)    Relevant updates since rounds:  None    Accepting teamSarah, received verbal sign out and the Provider Care team/Attending has been updated. Bedside nurse will now call accepting nurse  for report and patient will be transferred to [place] [id number].    Gulshan Chu MD  PGY-2 Internal Medicine

## 2024-07-11 NOTE — CONSULTS
Wound Care Consult     Visit Date: 7/11/2024      Patient Name: Bhargavi Isidro         MRN: [id number]           YOB: 1950     Reason for Consult: Left upper and forearm skin tears         Wound History: Left forearm skin tears were last seen by the wound care team on 7/8      Pertinent Labs:   Albumin   Date Value Ref Range Status   07/10/2024 3.0 (L) 3.4 - 5.0 g/dL Final       Wound Assessment:  Wound 07/04/24 Skin Tear Arm Dorsal;Left;Lower;Proximal (Active)   Wound Image   07/07/24 0126   Site Assessment Edema;Dry 07/11/24 0900   Jess-Wound Assessment Ecchymotic 07/11/24 0900   Non-staged Wound Description Partial thickness 07/08/24 1444   Margins Poorly defined 07/11/24 0900   Drainage Description Tan 07/11/24 0900   Drainage Amount Scant 07/11/24 0900   Dressing Xeroform;Foam 07/11/24 0900   Dressing Changed Changed 07/09/24 1500   Dressing Status Clean;Dry;Occlusive 07/09/24 2000       Wound 07/05/24 Other (comment) Left (Active)   Site Assessment Dry 07/11/24 0900   Jess-Wound Assessment Ecchymotic 07/09/24 1600   Wound Length (cm) 2 cm 07/08/24 1442   Wound Width (cm) 2 cm 07/08/24 1442   Wound Surface Area (cm^2) 4 cm^2 07/08/24 1442   State of Healing Eschar 07/08/24 1442   Drainage Description None 07/11/24 0900   Drainage Amount None 07/11/24 0900   Dressing Foam 07/11/24 0900   Dressing Changed Changed 07/09/24 1500   Dressing Status Clean;Dry;Occlusive 07/09/24 2000       Wound 07/05/24 Face Left (Active)   Wound Image   07/07/24 0554   Shape bruised/swollen 07/11/24 0900       Wound Team Summary Assessment: The wound care team came to bedside to assess the patient Left upper arm skin tears. The patient was seen by the wound care team on 7/8. The patient was down for a vascular procedure our first attempt to visit the patient. Wound care instructions and supplies were given to the patient's nurse for the dressing change at this time. During the second attempt the patient's nurse had  already change the dressing, so the dressing was left intact.        Wound Team Plan:   Recommendations for the left upper arm skin tears     Wound location: Left anterior upper arm/ left posterior lateral elbow and posterior Left lower arm   Recommendations: DAILY     Soak with NS soaked 4x4 gauze for 2-5 mins, pat dry     Cover skin tears with xeroform      Cover with mepilex transfer ([employer] order #[id number])  to upper arm and mepilex lite to posterolateral wounds.      Apply ABD pad over the transfer and wrap with kerlix rolled guaze.      Viral Russo RN CWON  7/11/2024  3:52 PM

## 2024-07-11 NOTE — PROGRESS NOTES
"Bhargavi Isidro is a 74 y.o. female on day 7 of admission presenting with SDH (subdural hematoma) (Multi).    Subjective   Patient doing well this morning. Breathing comfortably on 6L NC. Complains of pain from hip fracture. Denies SOB, chest pain, dizziness, dysuria or abdominal pain        Objective     Physical Exam  Constitutional:       Appearance: Normal appearance.   Cardiovascular:      Rate and Rhythm: Normal rate and regular rhythm.      Pulses: Normal pulses.      Heart sounds: Normal heart sounds.   Pulmonary:      Effort: Pulmonary effort is normal.      Breath sounds: Normal breath sounds.   Abdominal:      General: Abdomen is flat.      Palpations: Abdomen is soft.   Musculoskeletal:      Right lower leg: Edema present.      Left lower leg: Edema present.   Skin:     General: Skin is warm and dry.      Comments: Extensive ecchymoses on face and extremities    Neurological:      General: No focal deficit present.      Mental Status: She is alert and oriented to person, place, and time.   Psychiatric:         Mood and Affect: Mood normal.         Behavior: Behavior normal.       Constitutional: No acute distress, resting comfortably in bed, cooperative  HEENT: Normocephalic, bandages on L face w/ multiple areas of bruising, PERRLA, EOMI, moist mucous membranes  Cardiovascular: RRR, normal S1/S2, no murmurs noted, has tunneled line R chest  Pulmonary: Clear to auscultation b/l, no wheezes/crackles/rhonchi, no increased work of breathing, on 6L NC  GI: Soft, non-tender, non-distended, normoactive bowel sounds  : No martinez catheter  Lower extremities: Warm and well perfused, trace b/l lower extremity edema  Neuro: A&O x3, able to move all 4 extremities spontaneously  Skin: Ecchymosis of L face and all four extremities  Psych: Appropriate mood and affect      Last Recorded Vitals  Blood pressure 93/50, pulse 89, temperature 36.3 °C (97.3 °F), resp. rate 18, height 1.651 m (5' 5\"), weight 71.7 kg (158 lb 1.1 " oz), SpO2 91%.  Intake/Output last 3 Shifts:  I/O last 3 completed shifts:  In: 579.3 (8.1 mL/kg) [P.O.:200; I.V.:279.3 (3.9 mL/kg); IV Piggyback:100]  Out: 2225 (31 mL/kg) [Urine:2225 (0.9 mL/kg/hr)]  Weight: 71.7 kg       Assessment/Plan   Ms. Bhargavi Isidro is a 74yoF with PMHx of Afib (s/p PPM), CAD s/p CABG, COPD, chronic hypoxic respiratory failure (5-6L at rest, 10-15L exertion) 2/2 group I/II/III pHTN (on remodulin, sildenafil) who originally presented to [facility] [place] on 7/4 after a presumed mechanical fall where she was found down at home. On admission found to have R SDH & L inferior pubic rami fracture c/b hematoma formation, s/p eliquis reversal with andexxa. No operative interventions following consultation by IR, Ortho, Neurosurgery. While in TICU pt was intermittently hypotension and receiving maintenance and bolus IVF, and subsequently had increasing O2 requirement to 15L NC at rest and therefore was transferred to MICU 7/5. Pt continued to be hypotensive in MICU & was started on levo (7/6 - 7/7) and dopamine (7/7 - 7/9) with c/f cardiogenic shock, sildenafil was held. Pt briefly on a lasix gtt 7/8 & then intermittently bolused until back on home 6L NC on 7/10. Pt's course c/b AMS (unclear etiology, repeat CTH w/ no change in SDH, but now mental status improved), DEBORA on CKD (pre-renal likely ischemic ATN I/s/o hypotension), and c/f UTI. Pt now stable for transfer to the floor 7/10.       For follow up:   [ ] Home meds being held: buspar (for DEBORA), torsemide 40 BID, Kcl 40meq TID, sildenafil changed TID to BID inpt, mg oxide, allopurinol  [ ] Per NSGY can consider restarting eliquis post bleed day 14 (7/18)  [ ] CTH 2 wks post bleed (7/18), will be arranged by NSGY  [ ] Confirm new remodulin dose prior to discharge      #HFpEF  #Right sided heart failure  #Volume overload  # Cardiogenic shock? [RESOLVED]  - TTE (6/3/2024): LVEF of 55-60%, dilated RV with low normal RV systolic function and severe tricuspid  regurgiation  - TTE (04Jul24): LVEF of 73%, with LV remodeling, Severely enlarged RV, Severely dilated RA, Severe Tricuspid regurgitation   - RV failure likely secondary to underlying pulmonary hypertension  - Patient has received continuous drips of LR and D10W and has received intermittent boluses since being admitted  - On levo (7/6 - 7/7) and dopamine (7/7 - 7/9) with c/f cardiogenic shock  - Briefly on a lasix gtt 7/8 & then intermittently bolused until back on home 6L NC on 7/10    PLAN:  -Strict I/Os & daily weights  -Holding home torsemide 40mg BID  -Daily diuresis plan (pt could potentially start autodiuresing following ATN)    #Atrial fibrillation s/p ablation and PPM  - CHADS-VASC of 4, on eliquis  - S/p AV node ablation and PPM insertion  - Eliquis held I/s/o SDH, can restart post bleed day 14 (see above)  -Telemetry  -Per NSGY can consider restarting eliquis post bleed day 14 (7/18)    #CAD s/p CABG  - CABG done about 10 years ago  - No heart cath data in her chart  -Continue ASA 81mg     #Pulmonary HTN  - Has been thought to have group I, II, and III pulmonary hypertension at various times  - Most recent RHC (10/2022) significant for RAP 20 PAP 92/45 mPAP 61 PCWP 26 CO/CI (TD) 3.1/1.74 PVR 11.9 PaSat 57. Both pre and post-capillary hypertension  - RV dilation in TTE as above  - Patient prescribed 5-6LNC at rest, 10-15LNC on exertion but patient apparently only uses 6LNC and tolerates saturations in the 70s  - On remodulin infusion & sildenafil  Plan:  -C/w sildenafil 20mg BID (briefly held during admission)  -C/w remodulin dosing modified to remodulin 39ng/kg/min-no more titration-will refer for premixed cassettes   -C/w prednisone 10mg daily. This is a chronic medication for previously suspected organizing pneumonia    #Acute on chronic kidney injury, stage I, oliguric  - Baseline creatinine of around 1.4-1.5  - FENa 0.4% indicating prerenal DEBORA  - Etiology CRS vs ischemic ATN I/s/o hypotension  -  Renal u/s: chronic kidney disease no acute findings  Plan:  - Renal following  - Strict I/Os, daily RFPs    # Right sided subdural hematoma  - SDH seen on CT scan following mechanical fall in the setting of Eliquis use for A-fib  - Neurosurgery consulted, no operative management; signed off  - S/p reversal with Andexxa  - Repeat CT Head w/o contrast showed no significant changes in the size of the subdural hematoma  - Had AMS during MICU stay, now resolved  - Restarted dvt ppx on 7/8  - Per NSGY: hold aspirin until post bleed day 7 (7/11), hold eliquis until post bleed day 14 (7/18)  Plan:  -Continue home ASA 81mg  -Per NSGY can consider restarting eliquis post bleed day 14 (7/18)  -CTH 2 wks post bleed (7/18), will be arranged by NSGY    # C/f UTI  - No symptoms of dysuria but symptoms of reduced concentration and fatigue  - UA showing 11-20 WBC, 25 LE  - Blood cx 7/7: NGTD  - Urine cx 7/7; NGTD  -CTX 1g q24h (7/6 - 7/11)    #Left inferior pelvic ramus fracture  #Pelvic hematoma  - Occurred after fall  - No operative management per Ortho, no need for IR embolization  -Per Ortho, if she fails trial of ambulation she may be a candidate for operative intervention for pelvic pain relief  -Pain regimen: Tylenol 975mg TID, PRN Dilaudid 0.4mg q3h PRN, Lyrica 75mg BID, Robadin 500mg BID PRN    #Major depression  #Generalized anxiety disorder  PLAN:  -Hold home buspar I/s/o DEBORA  -Continue home lexapro 10 (unclear why holding)  -C/w lamotrigine 100mg daily  -C/w Xanax 0.5mg daily PRN for anxiety    Diet: 2-3g Na  DVT ppx: SQH  O2: 6L NC  Code status: DNAR/DNI  Surrogate medical decision maker: Jaleesa Zhengjose (friend) [phone]       Julian Hedrick MD    ======  I saw and evaluated the patient. I personally obtained the key and critical portions of the history and physical exam or was physically present for key and critical portions performed by the resident/fellow. I reviewed the resident/fellow's documentation and  discussed the patient with the resident/fellow. I agree with the resident/fellow's medical decision making as documented in the note.    Asher Oliver MD

## 2024-07-11 NOTE — CARE PLAN
The patient's goals for the shift include      The clinical goals for the shift include patient to maintain BP, MAP, O2 during shift.    Over the shift, the patient remained safe and without injury. Remodulin drip was changed with new tube and new filter at 0100. Patient tolerated well with no reaction. Wound consult recommended for patient's multiple wounds.

## 2024-07-11 NOTE — PROGRESS NOTES
Physical Therapy    Physical Therapy Treatment    Patient Name: Bhargavi Isidro  MRN: [id number]  Today's Date: 7/11/2024  Time Calculation  Start Time: 0954  Stop Time: 1017  Time Calculation (min): 23 min    Assessment/Plan   PT Assessment  PT Assessment Results: Decreased strength, Decreased range of motion, Decreased endurance, Impaired balance, Decreased mobility, Pain  Rehab Prognosis: Good  Barriers to Discharge: none  End of Session Communication: Bedside nurse  Assessment Comment: Pt continues to demonstrate generalized weakness with rapid onset of fatigue. Remains appropriate for continued PT in house and after discharge at MOD intensity to restore functional capacity.  End of Session Patient Position: Bed, 3 rail up, Alarm on  PT Plan  Inpatient/Swing Bed or Outpatient: Inpatient  PT Plan  Treatment/Interventions: Bed mobility, Transfer training, Gait training, Balance training, Strengthening, Endurance training, Therapeutic exercise, Therapeutic activity  PT Plan: Ongoing PT  PT Frequency: 6 times per week  PT Discharge Recommendations: Moderate intensity level of continued care  PT Recommended Transfer Status: Assist x1 (mod assist)  PT - OK to Discharge: Yes      General Visit Information:   PT  Visit  PT Received On: 07/11/24  General  Family/Caregiver Present: No  Patient Position Received: Bed, 3 rail up, Alarm on  Preferred Learning Style: verbal, auditory  General Comment: Pt resting in bed upon entry eating breakfast. Pleasant and cooperative. Willing to participate with PT on this date.    Subjective   Precautions:  Precautions  Medical Precautions: Fall precautions, Oxygen therapy device and L/min    Objective   Pain:  Pain Assessment  Pain Assessment: 0-10  0-10 (Numeric) Pain Score: 6  Pain Type: Acute pain  Pain Location: Hip  Pain Orientation: Left  Pain Interventions: Medication (See MAR)  Cognition:  Cognition  Overall Cognitive Status: Within Functional Limits (Very  pleasant)  Arousal/Alertness: Appropriate responses to stimuli  Orientation Level: Oriented X4  Following Commands: Follows all commands and directions without difficulty  Safety/Judgement: Within Functional Limits  Insight: Within function limits  Impulsive: Within functional limits  Coordination:  Movements are Fluid and Coordinated: Yes  Postural Control:  Postural Control  Postural Control: Impaired  Posture Comment: Tends to lean towards the L upright posture  Static Sitting Balance  Static Sitting-Balance Support: Feet supported  Static Sitting-Level of Assistance: Close supervision, Contact guard  Static Sitting-Comment/Number of Minutes: 15 minutes total  Static Standing Balance  Static Standing-Comment/Number of Minutes: Pt unable to tolerate attempts to stand on this date. Noting increased fatigue after sitting statically for 15 minutes. Anticipate patient would max assist to stand and a wheeled walker.    Activity Tolerance:  Activity Tolerance  Endurance: Tolerates 10 - 20 min exercise with multiple rests  Treatments:  Therapeutic Exercise  Therapeutic Exercise Activity 1: Reviewed AP, QS, GS. Pt verbalized and demonstrated understanding.    Therapeutic Activity  Therapeutic Activity Performed: Yes  Therapeutic Activity 1: bed mobility/transfers, static sit    Bed Mobility  Bed Mobility: Yes  Bed Mobility 1  Bed Mobility 1: Supine to sitting, Sitting to supine  Level of Assistance 1: Moderate assistance, Minimal verbal cues, Minimal tactile cues    Ambulation/Gait Training  Ambulation/Gait Training Performed: No  Transfers  Transfer: No (Pt unable to tolerate attempts on this date due to progressive fatigue. Anticipate would require mod/max assist to perform.)    Other Activity  Other Activity Performed: Yes    Outcome Measures:  [facility] Basic Mobility  Turning from your back to your side while in a flat bed without using bedrails: A lot  Moving from lying on your back to sitting on the side of a flat bed  without using bedrails: A lot  Moving to and from bed to chair (including a wheelchair): A lot  Standing up from a chair using your arms (e.g. wheelchair or bedside chair): A lot  To walk in hospital room: A lot  Climbing 3-5 steps with railing: Total  Basic Mobility - Total Score: 11    Education Documentation  Precautions, taught by Lincoln Quesada, PT at 7/11/2024 10:41 AM.  Learner: Patient  Readiness: Acceptance  Method: Explanation  Response: Verbalizes Understanding    Mobility Training, taught by Lincoln Quesada, PT at 7/11/2024 10:41 AM.  Learner: Patient  Readiness: Acceptance  Method: Explanation  Response: Verbalizes Understanding    Education Comments  No comments found.      Encounter Problems       Encounter Problems (Active)       Balance       Pt. will score >18 on Tinetti for lower risk of falls (Progressing)       Start:  07/05/24    Expected End:  07/19/24               Mobility       Patient will ambulate >25ft. with LRAD CGA.  (Progressing)       Start:  07/05/24    Expected End:  07/19/24            Patient will ascend and descend 2 stairs with railing with LRAD MinAx1. (Progressing)       Start:  07/05/24    Expected End:  07/19/24               PT Transfers       Patient will perform bed mobility Indep. (Progressing)       Start:  07/05/24    Expected End:  07/19/24            Patient will transfer sit to and from stand with LRAD CGA.  (Progressing)       Start:  07/05/24    Expected End:  07/19/24               Pain - Adult

## 2024-07-11 NOTE — SIGNIFICANT EVENT
"ICU to [facility] Transfer Summary     I:  ICU Admission Reason & Brief ICU Course:    Bhargavi Isidro is a 74 y.o. year old female patient with a history of A-fib, CAD s/p CABG, COPD, group I/II/III pulmonary hypertension who originally presented to [facility] [place] on 7/4 after a presumed fall where was found down at home. During the fall she obtained many abrasions and sustained a subdural hematoma.  Admitted on 7/4/2024 with following ICU needs: high O2 requirement     Bhargavi presented from the ED subsequent to a mechanical fall that she denies was preceded by dizziness or a feeling of lightheadedness. Secondary to this fall she developed a SDH and a fracture of her Left inferior pubic rami. She was seen by Orthopedic Surgery, Neurosurgery, Interventional Radiology who all recommended non-operative intervention, but she was reversed with Andexxa. She was kept in the TSICU where she was bolused with fluids and kept on a maintenance fluid drip for intermittent hypotension. During this time her oxygen needs continued to increase until she was on HFNC and transferred to the MICU for management of her pHTN and hypoxic respiratory failure. Upon admission she refused to get her remodulin infusions and insisted on using her home remodulin. The next day she was out of home remodulin and was transitioned to IV remodulin in the hospital. The initial dosing used her hospital weight and the next day her dosing was adjusted to match her previous \"dry weight\" dosing.     She was briefly on levophed and dopamine for hypotension as well as some noted somnolence on exam while she was diuresed for presumed iatrogenic pulmonary edema.     Dopamine was weaned off on 7/10 after PPM rate was turned up to 90bpm.  Tolerating diuresis. Oxygen weaned down to her baseline 6LNC and she remained hemodynamically stable off dopamine.        C: Code Status/DPOA Info/Goals of Care/ACP Note    DNR and No Intubation  DPOA/Contact Number: Jaleesa Rondon (friend) " [phone]     U: Unprescribing & Pertinent High-Risk Medications    Changes to home meds: Buspar, allopurinol held due to renal function abnormalities. Eliquis held in setting of SDH. Sildenafil changed to 20mg BID in setting of hypotension     Anticoagulation: Yes - SQH    Antibiotics:   [x] N/A - no current planned antimicrobioals    P: Pending Tests at the Time of Transfer   None      A: Active consultants, including Rehab:   [x]  Subspecialty Consultants: nephrology, neurosurgery  []  PT  []  OT  []  SLP  []  Wound Care    U: Uncertainty Measure/Diagnostic Pause:    Working diagnosis at the time of transfer pulmonary edema with pulmonary hypertension     Diagnosis Degree of Certainty: 1. High degree of certainty about the clinical diagnosis.     S: Summary of Major Problems and To-Dos:   NEUROLOGY/PSYCH:  Dx:  SDH from mechanical fall with Eliquis, s/p andexxa.  MDD, SEBASTIAN.  No surgical interventions  Management:  Hold home buspar  Ok for DVT ppx, restart Eliquis 14 days post fall  Cont. Lamotrigine, lexapro  Keppra per Neurosurgery, should touch base with them to decide on course     CARDIOVASCULAR:  Dx:  Hx of A-fib, HFpEF.   Distant CABG.  RV dilation, TTE.  PPM.  Hypotension, unable to wean dopamine.  Management:  Increase PPM rate to 90  Dopamine weaned off  Reassess fluid status before further diuresis as she is on her baseline oxygen  No rate control necessary for A-fib so far. Anticoagulation held as above     PULMONARY:  Dx:  Hx of pulm hypertension on sildenafil and Remodulin.  Acute on chronic respiratory failure.  On 6LNC which is her baseline  Management:  Cont. Diuresis as needed  Cont. Sildenafil, remodulin     RENAL/GENITOURINARY:  Dx:  DEBORA on CKD  Prerenal  Management:  Cont. Diuresis as needed based on volume exam  Keep martinez for now  Strict I/Os  Daiy RFP     GASTROENTEROLOGY:  Dx:  No issues     ENDOCRINOLOGY:  Dx:  no issues      HEMATOLOGY:  Dx:  Intracranial bleed s/p  Andexxa  Management:  Resume Eliquis 14 days post fall     MUSCULOSKELETAL/ SKIN:  Dx:  Hx of arthritis  Had previously been on prednisone for organizing pneumonia? Since ~2022, outpatient notes unable to taper due to joint pain on last note  Management:  prednisone     INFECTIOUS DISEASE:  Dx:  No issues  Management:  Culture if febrile    To-do list prior to transfer:  []Decide on Keppra course   []Further diuresis per volume exam  []Resume Eliquis 14 days post-fall     E: Exam, including Lines/Drains/Airways & Data Review:   Physical Exam  Constitutional:       Appearance: Normal appearance. She is normal weight.   HENT:      Head: Normocephalic.      Comments: Left sided facial hematoma  Eyes:      Conjunctiva/sclera: Conjunctivae normal.   Cardiovascular:      Rate and Rhythm: Normal rate and regular rhythm.      Heart sounds: Normal heart sounds.   Pulmonary:      Effort: Pulmonary effort is normal.      Breath sounds: Normal breath sounds.   Abdominal:      General: Abdomen is flat. Bowel sounds are normal.      Palpations: Abdomen is soft.   Genitourinary:     Comments: martinez  Musculoskeletal:      Cervical back: Neck supple.   Skin:     General: Skin is warm.      Capillary Refill: Capillary refill takes less than 2 seconds.   Neurological:      Mental Status: She is alert and oriented to person, place, and time.     Difficult airway? No  Lines/drains assessed for removal? Yes, describe: Martinez staying in place for accurate I/O    Within 30 minutes of the patient physically leaving the floor, a Floor Readiness Note needs to be placed with updated vitals.      Gulshan Chu MD  PGY-2 Internal Medicine

## 2024-07-11 NOTE — PROGRESS NOTES
"Bhargavi Isidro is a 74 y.o. female on day 6 of admission presenting with SDH (subdural hematoma) (Multi).    Subjective   Pt evaluated on lksd 50 after transfer from MICU. Pt on 6L NC. Pt reports feeling tired, but otherwise denies headache, dizziness, SOB, chest pain, nausea, vomiting, or abdominal pain.        Objective     Physical Exam  Constitutional: No acute distress, resting comfortably in bed, cooperative  HEENT: Normocephalic, bandages on L face w/ multiple areas of bruising, PERRLA, EOMI, moist mucous membranes  Cardiovascular: RRR, normal S1/S2, no murmurs noted, has tunneled line R chest  Pulmonary: Clear to auscultation b/l, no wheezes/crackles/rhonchi, no increased work of breathing, on 6L NC  GI: Soft, non-tender, non-distended, normoactive bowel sounds  : No martinez catheter  Lower extremities: Warm and well perfused, trace b/l lower extremity edema  Neuro: A&O x3, able to move all 4 extremities spontaneously  Skin: Ecchymosis of L face and all four extremities  Psych: Appropriate mood and affect      Last Recorded Vitals  Blood pressure 110/58, pulse 90, temperature 36.2 °C (97.2 °F), temperature source Temporal, resp. rate 15, height 1.651 m (5' 5\"), weight 73.2 kg (161 lb 6 oz), SpO2 100%.  Intake/Output last 3 Shifts:  I/O last 3 completed shifts:  In: 1317.6 (18 mL/kg) [P.O.:850; I.V.:417.6 (5.7 mL/kg); IV Piggyback:50]  Out: 2555 (34.9 mL/kg) [Urine:2555 (1 mL/kg/hr)]  Weight: 73.2 kg       Assessment/Plan   Ms. Bhargavi Isidro is a 74yoF with PMHx of Afib (s/p PPM), CAD s/p CABG, COPD, chronic hypoxic respiratory failure (5-6L at rest, 10-15L exertion) 2/2 group I/II/III pHTN (on remodulin, sildenafil) who originally presented to [facility] on 7/4 after a presumed mechanical fall where she was found down at home. On admission found to have R SDH & L inferior pubic rami fracture c/b hematoma formation, s/p eliquis reversal with andexxa. No operative interventions following consultation by IR, Ortho, " Neurosurgery. While in TICU pt was intermittently hypotension and receiving maintenance and bolus IVF, and subsequently had increasing O2 requirement to 15L NC at rest and therefore was transferred to MICU 7/5. Pt continued to be hypotensive in MICU & was started on levo (7/6 - 7/7) and dopamine (7/7 - 7/9) with c/f cardiogenic shock, sildenafil was held. Pt briefly on a lasix gtt 7/8 & then intermittently bolused until back on home 6L NC on 7/10. Pt's course c/b AMS (unclear etiology, repeat CTH w/ no change in SDH, but now mental status improved), DEBORA on CKD (pre-renal likely ischemic ATN I/s/o hypotension), and c/f UTI. Pt now stable for transfer to the floor 7/10.       For follow up:   [ ] CTX 1g q24h (7/6 - *) for c/f complicated UTI, decide on duration  [ ] Consider restarting aspirin 81mg 7/11  [ ] Wound care consult placed for L arm skin tear; follow up recs  [ ] Wean pain meds as tolerated  [ ] Home meds being held: buspar (for DEBORA), lexapro (unclear why holding), torsemide 40 BID, Kcl 40meq TID, sildenafil changed TID to BID inpt, mg oxide, allopurinol  [ ] Per NSGY can consider restarting eliquis post bleed day 14 (7/18)  [ ] CTH 2 wks post bleed (7/18), will be arranged by NSGY  [ ] Confirm new remodulin dose prior to discharge      # HFpEF  #Right sided heart failure  # Cardiogenic shock? [RESOLVED]  - TTE (6/3/2024): LVEF of 55-60%, dilated RV with low normal RV systolic function and severe tricuspid regurgiation  - TTE (04Jul24): LVEF of 73%, with LV remodeling, Severely enlarged RV, Severely dilated RA, Severe Tricuspid regurgitation   - RV failure likely secondary to underlying pulmonary hypertension  - Patient has received continuous drips of LR and D10W and has received intermittent boluses since being admitted  - On levo (7/6 - 7/7) and dopamine (7/7 - 7/9) with c/f cardiogenic shock  - Briefly on a lasix gtt 7/8 & then intermittently bolused until back on home 6L NC on 7/10    PLAN:  [ ] Strict  I/Os & daily weights  [ ] Holding home torsemide 40mg BID  [ ] Daily diuresis plan (pt could potentially start autodiuresing following ATN)    #Atrial fibrillation s/p ablation and PPM  - CHADS-VASC of 4, on eliquis  - S/p AV node ablation and PPM insertion  - Eliquis held I/s/o SDH, can restart post bleed day 14 (see above)  [ ] Telemetry  [ ] Per NSGY can consider restarting eliquis post bleed day 14 (7/18)    #CAD s/p CABG  - CABG done about 10 years ago  - No heart cath data in her chart  [ ] Consider restarting aspirin 81mg 7/11    #Pulmonary HTN  - Has been thought to have group I, II, and III pulmonary hypertension at various times  - Most recent RHC (10/2022) significant for RAP 20 PAP 92/45 mPAP 61 PCWP 26 CO/CI (TD) 3.1/1.74 PVR 11.9 PaSat 57. Both pre and post-capillary hypertension  - RV dilation in TTE as above  - Patient prescribed 5-6LNC at rest, 10-15LNC on exertion but patient apparently only uses 6LNC and tolerates saturations in the 70s  - On remodulin infusion & sildenafil  [ ] C/w sildenafil 20mg BID (briefly held during admission)  [ ] C/w remodulin dosing modified to remodulin 39ng/kg/min-no more titration-will refer for premixed cassettes   [ ] C/w prednisone 10mg daily. This is a chronic medication for previously suspected organizing pneumonia    #Acute on chronic kidney injury, stage I, oliguric  - Baseline creatinine of around 1.4-1.5  - FENa 0.4% indicating prerenal DEBORA  - Etiology CRS vs ischemic ATN I/s/o hypotension  - Renal u/s: chronic kidney disease no acute findings  - Renal following  [ ] Strict I/Os, daily RFPs    # Right sided subdural hematoma  - SDH seen on CT scan following mechanical fall in the setting of Eliquis use for A-fib  - Neurosurgery consulted, no operative management; signed off  - S/p reversal with Andexxa  - Repeat CT Head w/o contrast showed no significant changes in the size of the subdural hematoma  - Had AMS during MICU stay, now resolved  - Restarted dvt  ppx on 7/8  - Per NSGY: hold aspirin until post bleed day 7 (7/11), hold eliquis until post bleed day 14 (7/18)    PLAN:  [ ] Consider restarting aspirin 81mg 7/11  [ ] Per NSGY can consider restarting eliquis post bleed day 14 (7/18)  [ ] CTH 2 wks post bleed (7/18), will be arranged by NSGY    # C/f UTI  - No symptoms of dysuria but symptoms of reduced concentration and fatigue  - UA showing 11-20 WBC, 25 LE  - Blood cx 7/7: NGTD  - Urine cx 7/7; NGTD  [ ] CTX 1g q24h (7/6 - *)    #Left inferior pelvic ramus fracture  #Pelvic hematoma  - Occurred after fall  - No operative management per Ortho, no need for IR embolization  -Per Ortho, if she fails trial of ambulation she may be a candidate for operative intervention for pelvic pain relief  [ ] Pain regimen: Tylenol 975mg TID, PRN Dilaudid 0.4mg q3h PRN, Lyrica 75mg BID, Robadin 500mg BID PRN    #Major depression  #Generalized anxiety disorder  PLAN:  [ ] Hold home buspar I/s/o DEBORA  [ ] Hold home lexapro 10 (unclear why holding)  [ ] C/w lamotrigine 100mg daily  [ ] C/w Xanax 0.5mg daily PRN for anxiety    Diet: 2-3g Na  DVT ppx: SQH  O2: 6L NC  Code status: DNAR/DNI  Surrogate medical decision maker: Jaleesa Rondon (friend) [phone]       Jenny Castañeda MD  Internal Medicine PGY-3    ======  I reviewed the resident/fellow's documentation and discussed the patient with the resident/fellow. I agree with the resident/fellow's medical decision making as documented in the note.     Asher Oliver MD

## 2024-07-11 NOTE — CARE PLAN
Pt remained HDS and free of injury this shift. Pt remains on Remodulin through [facility] d/t be changed at 2100. Pt had martinez catheter re-inserted d/t acute urinary retention. Pt still having L hip pain - relieved with Tylenol and Dilaudid. Pt had BLE U/S this shift. Pt in good spirits. L arm dressing changed this shift - wound care to round on pt tomorrow.    Problem: Pain - Adult  Goal: Verbalizes/displays adequate comfort level or baseline comfort level  Outcome: Progressing     Problem: Safety - Adult  Goal: Free from fall injury  Outcome: Progressing     Problem: Discharge Planning  Goal: Discharge to home or other facility with appropriate resources  Outcome: Progressing     Problem: Chronic Conditions and Co-morbidities  Goal: Patient's chronic conditions and co-morbidity symptoms are monitored and maintained or improved  Outcome: Progressing     Problem: Skin  Goal: Decreased wound size/increased tissue granulation at next dressing change  Outcome: Progressing  Goal: Participates in plan/prevention/treatment measures  Outcome: Progressing  Goal: Prevent/manage excess moisture  Outcome: Progressing  Goal: Prevent/minimize sheer/friction injuries  Outcome: Progressing  Goal: Promote/optimize nutrition  Outcome: Progressing  Goal: Promote skin healing  Outcome: Progressing     Problem: Pain  Goal: Takes deep breaths with improved pain control throughout the shift  Outcome: Progressing  Goal: Turns in bed with improved pain control throughout the shift  Outcome: Progressing  Goal: Walks with improved pain control throughout the shift  Outcome: Progressing  Goal: Performs ADL's with improved pain control throughout shift  Outcome: Progressing  Goal: Participates in PT with improved pain control throughout the shift  Outcome: Progressing  Goal: Free from opioid side effects throughout the shift  Outcome: Progressing  Goal: Free from acute confusion related to pain meds throughout the shift  Outcome: Progressing      Problem: Fall/Injury  Goal: Not fall by end of shift  Outcome: Progressing  Goal: Be free from injury by end of the shift  Outcome: Progressing  Goal: Verbalize understanding of personal risk factors for fall in the hospital  Outcome: Progressing  Goal: Verbalize understanding of risk factor reduction measures to prevent injury from fall in the home  Outcome: Progressing  Goal: Use assistive devices by end of the shift  Outcome: Progressing  Goal: Pace activities to prevent fatigue by end of the shift  Outcome: Progressing

## 2024-07-12 ENCOUNTER — APPOINTMENT (OUTPATIENT)
Dept: RADIOLOGY | Facility: HOSPITAL | Age: 74
DRG: 963 | End: 2024-07-12
Payer: MEDICARE

## 2024-07-12 LAB
ALBUMIN SERPL BCP-MCNC: 2.9 G/DL (ref 3.4–5)
ANION GAP BLDA CALCULATED.4IONS-SCNC: 7 MMO/L (ref 10–25)
ANION GAP SERPL CALC-SCNC: 13 MMOL/L (ref 10–20)
BASE EXCESS BLDA CALC-SCNC: 1.3 MMOL/L (ref -2–3)
BASOPHILS # BLD AUTO: 0.05 X10*3/UL (ref 0–0.1)
BASOPHILS NFR BLD AUTO: 0.4 %
BODY TEMPERATURE: ABNORMAL
BUN SERPL-MCNC: 31 MG/DL (ref 6–23)
CA-I BLDA-SCNC: 1.15 MMOL/L (ref 1.1–1.33)
CALCIUM SERPL-MCNC: 7.7 MG/DL (ref 8.6–10.6)
CHLORIDE BLDA-SCNC: 105 MMOL/L (ref 98–107)
CHLORIDE SERPL-SCNC: 104 MMOL/L (ref 98–107)
CO2 SERPL-SCNC: 24 MMOL/L (ref 21–32)
CREAT SERPL-MCNC: 1.29 MG/DL (ref 0.5–1.05)
EGFRCR SERPLBLD CKD-EPI 2021: 44 ML/MIN/1.73M*2
EOSINOPHIL # BLD AUTO: 0.17 X10*3/UL (ref 0–0.4)
EOSINOPHIL NFR BLD AUTO: 1.5 %
ERYTHROCYTE [DISTWIDTH] IN BLOOD BY AUTOMATED COUNT: 24 % (ref 11.5–14.5)
GLUCOSE BLDA-MCNC: 100 MG/DL (ref 74–99)
GLUCOSE SERPL-MCNC: 86 MG/DL (ref 74–99)
HCO3 BLDA-SCNC: 24.4 MMOL/L (ref 22–26)
HCT VFR BLD AUTO: 28.4 % (ref 36–46)
HCT VFR BLD EST: 28 % (ref 36–46)
HGB BLD-MCNC: 8.6 G/DL (ref 12–16)
HGB BLDA-MCNC: 9.2 G/DL (ref 12–16)
HYPOCHROMIA BLD QL SMEAR: NORMAL
IMM GRANULOCYTES # BLD AUTO: 0.38 X10*3/UL (ref 0–0.5)
IMM GRANULOCYTES NFR BLD AUTO: 3.3 % (ref 0–0.9)
INHALED O2 CONCENTRATION: 100 %
LACTATE BLDA-SCNC: 1.3 MMOL/L (ref 0.4–2)
LYMPHOCYTES # BLD AUTO: 1.37 X10*3/UL (ref 0.8–3)
LYMPHOCYTES NFR BLD AUTO: 11.8 %
MAGNESIUM SERPL-MCNC: 1.92 MG/DL (ref 1.6–2.4)
MCH RBC QN AUTO: 24.3 PG (ref 26–34)
MCHC RBC AUTO-ENTMCNC: 30.3 G/DL (ref 32–36)
MCV RBC AUTO: 80 FL (ref 80–100)
MONOCYTES # BLD AUTO: 0.46 X10*3/UL (ref 0.05–0.8)
MONOCYTES NFR BLD AUTO: 4 %
NEUTROPHILS # BLD AUTO: 9.2 X10*3/UL (ref 1.6–5.5)
NEUTROPHILS NFR BLD AUTO: 79 %
NRBC BLD-RTO: 0.5 /100 WBCS (ref 0–0)
OVALOCYTES BLD QL SMEAR: NORMAL
OXYHGB MFR BLDA: 97.2 % (ref 94–98)
PCO2 BLDA: 32 MM HG (ref 38–42)
PH BLDA: 7.49 PH (ref 7.38–7.42)
PHOSPHATE SERPL-MCNC: 1.9 MG/DL (ref 2.5–4.9)
PLATELET # BLD AUTO: 150 X10*3/UL (ref 150–450)
PO2 BLDA: 169 MM HG (ref 85–95)
POLYCHROMASIA BLD QL SMEAR: NORMAL
POTASSIUM BLDA-SCNC: 3.6 MMOL/L (ref 3.5–5.3)
POTASSIUM SERPL-SCNC: 3.5 MMOL/L (ref 3.5–5.3)
RBC # BLD AUTO: 3.54 X10*6/UL (ref 4–5.2)
RBC MORPH BLD: NORMAL
SAO2 % BLDA: 99 % (ref 94–100)
SODIUM BLDA-SCNC: 133 MMOL/L (ref 136–145)
SODIUM SERPL-SCNC: 137 MMOL/L (ref 136–145)
WBC # BLD AUTO: 11.6 X10*3/UL (ref 4.4–11.3)

## 2024-07-12 PROCEDURE — 1200000002 HC GENERAL ROOM WITH TELEMETRY DAILY

## 2024-07-12 PROCEDURE — 82435 ASSAY OF BLOOD CHLORIDE: CPT

## 2024-07-12 PROCEDURE — 2500000004 HC RX 250 GENERAL PHARMACY W/ HCPCS (ALT 636 FOR OP/ED)

## 2024-07-12 PROCEDURE — 83735 ASSAY OF MAGNESIUM: CPT

## 2024-07-12 PROCEDURE — 2500000001 HC RX 250 WO HCPCS SELF ADMINISTERED DRUGS (ALT 637 FOR MEDICARE OP)

## 2024-07-12 PROCEDURE — 85025 COMPLETE CBC W/AUTO DIFF WBC: CPT

## 2024-07-12 PROCEDURE — 36600 WITHDRAWAL OF ARTERIAL BLOOD: CPT

## 2024-07-12 PROCEDURE — 94660 CPAP INITIATION&MGMT: CPT

## 2024-07-12 PROCEDURE — 2500000005 HC RX 250 GENERAL PHARMACY W/O HCPCS

## 2024-07-12 PROCEDURE — 84132 ASSAY OF SERUM POTASSIUM: CPT

## 2024-07-12 PROCEDURE — 71045 X-RAY EXAM CHEST 1 VIEW: CPT | Performed by: RADIOLOGY

## 2024-07-12 PROCEDURE — 87081 CULTURE SCREEN ONLY: CPT

## 2024-07-12 PROCEDURE — 2500000002 HC RX 250 W HCPCS SELF ADMINISTERED DRUGS (ALT 637 FOR MEDICARE OP, ALT 636 FOR OP/ED)

## 2024-07-12 PROCEDURE — 99233 SBSQ HOSP IP/OBS HIGH 50: CPT | Performed by: INTERNAL MEDICINE

## 2024-07-12 PROCEDURE — 71045 X-RAY EXAM CHEST 1 VIEW: CPT

## 2024-07-12 RX ORDER — VANCOMYCIN HYDROCHLORIDE 1 G/20ML
INJECTION, POWDER, LYOPHILIZED, FOR SOLUTION INTRAVENOUS DAILY PRN
Status: DISCONTINUED | OUTPATIENT
Start: 2024-07-12 | End: 2024-07-12

## 2024-07-12 RX ORDER — TORSEMIDE 20 MG/1
20 TABLET ORAL DAILY
Status: DISCONTINUED | OUTPATIENT
Start: 2024-07-12 | End: 2024-07-17 | Stop reason: HOSPADM

## 2024-07-12 RX ADMIN — LOPERAMIDE HYDROCHLORIDE 2 MG: 2 CAPSULE ORAL at 09:44

## 2024-07-12 RX ADMIN — POTASSIUM PHOSPHATE, MONOBASIC 500 MG: 500 TABLET, SOLUBLE ORAL at 12:44

## 2024-07-12 RX ADMIN — PREGABALIN 75 MG: 25 CAPSULE ORAL at 21:41

## 2024-07-12 RX ADMIN — PREDNISONE 10 MG: 20 TABLET ORAL at 09:30

## 2024-07-12 RX ADMIN — ESCITALOPRAM 10 MG: 10 TABLET, FILM COATED ORAL at 09:31

## 2024-07-12 RX ADMIN — LEVETIRACETAM 500 MG: 500 TABLET, FILM COATED ORAL at 09:31

## 2024-07-12 RX ADMIN — ACETAMINOPHEN 975 MG: 325 TABLET ORAL at 21:40

## 2024-07-12 RX ADMIN — POTASSIUM PHOSPHATE, MONOBASIC 500 MG: 500 TABLET, SOLUBLE ORAL at 21:40

## 2024-07-12 RX ADMIN — ACETAMINOPHEN 975 MG: 325 TABLET ORAL at 14:59

## 2024-07-12 RX ADMIN — CEFTRIAXONE SODIUM 1 G: 1 INJECTION, SOLUTION INTRAVENOUS at 00:38

## 2024-07-12 RX ADMIN — HYDROMORPHONE HYDROCHLORIDE 0.4 MG: 1 INJECTION, SOLUTION INTRAMUSCULAR; INTRAVENOUS; SUBCUTANEOUS at 02:34

## 2024-07-12 RX ADMIN — HYDROMORPHONE HYDROCHLORIDE 0.4 MG: 1 INJECTION, SOLUTION INTRAMUSCULAR; INTRAVENOUS; SUBCUTANEOUS at 09:37

## 2024-07-12 RX ADMIN — SILDENAFIL 20 MG: 20 TABLET ORAL at 21:40

## 2024-07-12 RX ADMIN — TREPROSTINIL 39 NG/KG/MIN: 5 INJECTION, SOLUTION INTRAVENOUS; SUBCUTANEOUS at 21:23

## 2024-07-12 RX ADMIN — PIPERACILLIN SODIUM AND TAZOBACTAM SODIUM 2.25 G: 2; .25 INJECTION, SOLUTION INTRAVENOUS at 06:52

## 2024-07-12 RX ADMIN — POTASSIUM PHOSPHATE, MONOBASIC 500 MG: 500 TABLET, SOLUBLE ORAL at 17:16

## 2024-07-12 RX ADMIN — TORSEMIDE 20 MG: 20 TABLET ORAL at 10:11

## 2024-07-12 RX ADMIN — SILDENAFIL 20 MG: 20 TABLET ORAL at 09:31

## 2024-07-12 RX ADMIN — ACETAMINOPHEN 975 MG: 325 TABLET ORAL at 09:31

## 2024-07-12 RX ADMIN — HEPARIN SODIUM 5000 UNITS: 5000 INJECTION INTRAVENOUS; SUBCUTANEOUS at 06:48

## 2024-07-12 RX ADMIN — PREGABALIN 75 MG: 25 CAPSULE ORAL at 09:31

## 2024-07-12 RX ADMIN — HEPARIN SODIUM 5000 UNITS: 5000 INJECTION INTRAVENOUS; SUBCUTANEOUS at 12:39

## 2024-07-12 RX ADMIN — LOPERAMIDE HYDROCHLORIDE 2 MG: 2 CAPSULE ORAL at 14:59

## 2024-07-12 RX ADMIN — HYDROMORPHONE HYDROCHLORIDE 0.4 MG: 1 INJECTION, SOLUTION INTRAMUSCULAR; INTRAVENOUS; SUBCUTANEOUS at 19:19

## 2024-07-12 RX ADMIN — LAMOTRIGINE 100 MG: 100 TABLET ORAL at 09:31

## 2024-07-12 RX ADMIN — HYDROMORPHONE HYDROCHLORIDE 0.4 MG: 1 INJECTION, SOLUTION INTRAMUSCULAR; INTRAVENOUS; SUBCUTANEOUS at 15:05

## 2024-07-12 RX ADMIN — HEPARIN SODIUM 5000 UNITS: 5000 INJECTION INTRAVENOUS; SUBCUTANEOUS at 21:40

## 2024-07-12 RX ADMIN — VANCOMYCIN HYDROCHLORIDE 1750 MG: 5 INJECTION, POWDER, LYOPHILIZED, FOR SOLUTION INTRAVENOUS at 08:58

## 2024-07-12 RX ADMIN — ASPIRIN 81 MG: 81 TABLET, COATED ORAL at 09:30

## 2024-07-12 RX ADMIN — AZELASTINE HYDROCHLORIDE 1 SPRAY: 137 SPRAY, METERED NASAL at 21:45

## 2024-07-12 RX ADMIN — AZELASTINE HYDROCHLORIDE 1 SPRAY: 137 SPRAY, METERED NASAL at 09:31

## 2024-07-12 RX ADMIN — PANTOPRAZOLE SODIUM 40 MG: 40 TABLET, DELAYED RELEASE ORAL at 06:47

## 2024-07-12 RX ADMIN — LEVETIRACETAM 500 MG: 500 TABLET, FILM COATED ORAL at 21:41

## 2024-07-12 RX ADMIN — LOPERAMIDE HYDROCHLORIDE 2 MG: 2 CAPSULE ORAL at 21:40

## 2024-07-12 ASSESSMENT — COGNITIVE AND FUNCTIONAL STATUS - GENERAL
DRESSING REGULAR UPPER BODY CLOTHING: A LOT
CLIMB 3 TO 5 STEPS WITH RAILING: TOTAL
PERSONAL GROOMING: A LOT
TOILETING: A LOT
WALKING IN HOSPITAL ROOM: TOTAL
DAILY ACTIVITIY SCORE: 12
MOBILITY SCORE: 8
TURNING FROM BACK TO SIDE WHILE IN FLAT BAD: A LOT
EATING MEALS: A LITTLE
HELP NEEDED FOR BATHING: A LOT
DRESSING REGULAR UPPER BODY CLOTHING: A LOT
CLIMB 3 TO 5 STEPS WITH RAILING: TOTAL
DRESSING REGULAR LOWER BODY CLOTHING: TOTAL
PERSONAL GROOMING: A LOT
STANDING UP FROM CHAIR USING ARMS: TOTAL
WALKING IN HOSPITAL ROOM: TOTAL
MOVING FROM LYING ON BACK TO SITTING ON SIDE OF FLAT BED WITH BEDRAILS: A LOT
MOVING TO AND FROM BED TO CHAIR: TOTAL
EATING MEALS: A LITTLE
TOILETING: A LOT
DRESSING REGULAR LOWER BODY CLOTHING: TOTAL
TURNING FROM BACK TO SIDE WHILE IN FLAT BAD: A LOT
STANDING UP FROM CHAIR USING ARMS: TOTAL
MOVING TO AND FROM BED TO CHAIR: TOTAL
MOVING FROM LYING ON BACK TO SITTING ON SIDE OF FLAT BED WITH BEDRAILS: A LOT
HELP NEEDED FOR BATHING: A LOT

## 2024-07-12 ASSESSMENT — PAIN - FUNCTIONAL ASSESSMENT
PAIN_FUNCTIONAL_ASSESSMENT: 0-10

## 2024-07-12 ASSESSMENT — PAIN DESCRIPTION - ORIENTATION
ORIENTATION: LEFT
ORIENTATION: LEFT

## 2024-07-12 ASSESSMENT — PAIN DESCRIPTION - LOCATION
LOCATION: HIP
LOCATION: HIP

## 2024-07-12 ASSESSMENT — PAIN SCALES - GENERAL
PAINLEVEL_OUTOF10: 4
PAINLEVEL_OUTOF10: 7
PAINLEVEL_OUTOF10: 5 - MODERATE PAIN
PAINLEVEL_OUTOF10: 8
PAINLEVEL_OUTOF10: 5 - MODERATE PAIN

## 2024-07-12 ASSESSMENT — ACTIVITIES OF DAILY LIVING (ADL): LACK_OF_TRANSPORTATION: NO

## 2024-07-12 NOTE — CARE PLAN
Pt remained HDS and free of injury through shift. Pt had an uneventful day. Pt remains on Remodulin. Pt maintained O2 sats >90% through shift. Pt still having L hip pain managed with PRN dilaudid and scheduled tylenol.    Problem: Pain - Adult  Goal: Verbalizes/displays adequate comfort level or baseline comfort level  Outcome: Progressing     Problem: Safety - Adult  Goal: Free from fall injury  Outcome: Progressing     Problem: Discharge Planning  Goal: Discharge to home or other facility with appropriate resources  Outcome: Progressing     Problem: Chronic Conditions and Co-morbidities  Goal: Patient's chronic conditions and co-morbidity symptoms are monitored and maintained or improved  Outcome: Progressing     Problem: Skin  Goal: Decreased wound size/increased tissue granulation at next dressing change  Outcome: Progressing  Goal: Participates in plan/prevention/treatment measures  Outcome: Progressing  Goal: Prevent/manage excess moisture  Outcome: Progressing  Goal: Prevent/minimize sheer/friction injuries  Outcome: Progressing  Goal: Promote/optimize nutrition  Outcome: Progressing  Goal: Promote skin healing  Outcome: Progressing     Problem: Pain  Goal: Takes deep breaths with improved pain control throughout the shift  Outcome: Progressing  Goal: Turns in bed with improved pain control throughout the shift  Outcome: Progressing  Goal: Walks with improved pain control throughout the shift  Outcome: Progressing  Goal: Performs ADL's with improved pain control throughout shift  Outcome: Progressing  Goal: Participates in PT with improved pain control throughout the shift  Outcome: Progressing  Goal: Free from opioid side effects throughout the shift  Outcome: Progressing  Goal: Free from acute confusion related to pain meds throughout the shift  Outcome: Progressing     Problem: Fall/Injury  Goal: Not fall by end of shift  Outcome: Progressing  Goal: Be free from injury by end of the shift  Outcome:  Progressing  Goal: Verbalize understanding of personal risk factors for fall in the hospital  Outcome: Progressing  Goal: Verbalize understanding of risk factor reduction measures to prevent injury from fall in the home  Outcome: Progressing  Goal: Use assistive devices by end of the shift  Outcome: Progressing  Goal: Pace activities to prevent fatigue by end of the shift  Outcome: Progressing

## 2024-07-12 NOTE — PROGRESS NOTES
Vancomycin Dosing by Pharmacy- Cessation of Therapy    Consult to pharmacy for vancomycin dosing has been discontinued by the prescriber, pharmacy will sign off at this time.    Please call pharmacy if there are further questions or re-enter a consult if vancomycin is resumed.     Erendira Musa, DougD

## 2024-07-12 NOTE — PROGRESS NOTES
07/12/24 1128   Discharge Planning   Living Arrangements Alone   Support Systems Friends/neighbors   Assistance Needed None   Type of Residence Private residence   Do you have animals or pets at home? Yes   Type of Animals or Pets cat   Who is requesting discharge planning? Provider   Home or Post Acute Services None   Does the patient need discharge transport arranged? No   Financial Resource Strain   How hard is it for you to pay for the very basics like food, housing, medical care, and heating? Not very   Housing Stability   In the last 12 months, was there a time when you were not able to pay the mortgage or rent on time? N   At any time in the past 12 months, were you homeless or living in a shelter (including now)? N   Transportation Needs   In the past 12 months, has lack of transportation kept you from medical appointments or from getting medications? no   In the past 12 months, has lack of transportation kept you from meetings, work, or from getting things needed for daily living? No   Patient Choice   Provider Choice list and CMS website (https://medicare.gov/care-compare#search) for post-acute Quality and Resource Measure Data were provided and reviewed with: Patient   Patient / Family choosing to utilize agency / facility established prior to hospitalization No     Home home care    07/12/24 1128   Discharge Planning   Living Arrangements Alone   Support Systems Friends/neighbors   Assistance Needed None   Type of Residence Private residence   Do you have animals or pets at home? Yes   Type of Animals or Pets cat   Who is requesting discharge planning? Provider   Home or Post Acute Services None   Does the patient need discharge transport arranged? No   Financial Resource Strain   How hard is it for you to pay for the very basics like food, housing, medical care, and heating? Not very   Housing Stability   In the last 12 months, was there a time when you were not able to pay the mortgage or rent on  time? N   At any time in the past 12 months, were you homeless or living in a shelter (including now)? N   Transportation Needs   In the past 12 months, has lack of transportation kept you from medical appointments or from getting medications? no   In the past 12 months, has lack of transportation kept you from meetings, work, or from getting things needed for daily living? No   Patient Choice   Provider Choice list and CMS website (https://medicare.gov/care-compare#search) for post-acute Quality and Resource Measure Data were provided and reviewed with: Patient   Patient / Family choosing to utilize agency / facility established prior to hospitalization No     Home with home care pt/ot

## 2024-07-12 NOTE — SIGNIFICANT EVENT
Rapid Response paged at 0435 hours as a result of the patient developing oxygen desaturation.  Dr. Erick Suárez resident covering [facility] and Dr. Jenny Castañeda NACR came to the room to assess.  A nonrebreather mask was placed on the patient in order to achieve O2 saturations > 90 %  RRT luis an arterial blood gas.  Portable CXR obtained.   ABG results returned as: pH 7.49, pCO2 32, pO2 169, HCO3 24.4, SaO2 99.1%  Based on same results, O2 was weaned to previous high flow NC at 10 L, and within ten minutes time, down to 8 L.  Pulse oximetry results maintained as > 95 % during this time

## 2024-07-12 NOTE — SIGNIFICANT EVENT
"Rapid response called at 4:35am for hypoxia. Per night intern and nursing staff pt's vitals checked at 4:32am and SpO2 noted to be 70% on her baseline 6L NC. Pt did not feel SOB at that time. Contacted intern who then contacted NACR to come evaluate pt. Upon arrival to room pt was still sating high 60s-low 70% on 6L NC. Writer immediately increased her flow to 15L NC (had high flow NC on already) and asked to call a rapid response and get a NRB. Pt's sats still mid 70s on 15L NC (confirmed on multiple pulse oxs) therefore placed pt on the NRB and sats increased %. Per pt (and confirmed by nursing) her remodulin infusion had been persistently beeping all night for \"air in line\"; nurse stated that she would always restart it within about 2 min of it beeping. Pt breathing comfortably on exam, no respiratory distress or accessory muscle use; mentating appropriately and at her baseline (writer had seen the pt just yesterday when she transferred to the floor from the MICU). AB.49/pCO2 32/pO2 169, SO2 99.1%, lact 1.3 while pt was on NRB satting 100%. During rapid pt weaned to 8L NC and continued to saturate high 90s.      Differential for worsening oxygen requirement includes the following: pulmonary edema, pneumonia, mucous plug, PE. Appears that yesterday pt was autodiuresing over past 24 hours, was net -1285cc with no diuretic given. TTE 24 LVEF 73%, RV severely dilated w/ systolic dysfunction compared to mildly dilated in 2016, RVSP 90mmHg (vs 44mmHg previously), trivial pericardial effusion. CXR with potential consolidation LLL; pt does endorse new cough. Pt's remodulin stopped reportedly for only a few minutes at a time during the night when drip alarmed; based on half life this should not cause such acute worsening of pulmonary hypertension although not out of the realm of possibility. Pt has been on DVT ppx SQH since  without missing doses. Will cover hospital acquired pneumonia and about to " signout so day team will decide on diuresis.       Plan:  [X] continuous pulse ox  [X] zosyn 2.25 q6h  [X] vanc rph to dose  [X] mrsa nares  [X] discuss with day team decision to diurese

## 2024-07-12 NOTE — SIGNIFICANT EVENT
Called to bedside at 0420 for increased oxygen requirement and desaturations. On arrival pt spo2 was 65% on 10L NC (baseline 6L). Pt was in no acute distress, speaking in full sentences and denied any CP,SOB,lightheadedness, or other symptoms. Sats consistently in 60s on multiple pulse oximeters. Started pt. on nonrebreather and spO2 increased to 99%. Of note remodulin appeared to be running throughout the night but intermittent pauses due to air in the line. Ordered cxr and obtained ABG. Initial look at CXR with maybe increased LLL opacity but otherwise nothing significant. AB.49 / 32 / 169 (had only been on NRB for a few minutes). Pt. does describe a new productive cough but otherwise does not recall any new complaints. D/t findings on ABG began weaning O2 down.    0450: Pt. Taken off down to 8L NC and saturating 96%  0510: Continuing to sat 95%+ on 8L. Currently asymptomatic. Starting Vanc/Zosyn for empiric coverage.  -Will discuss further treatment with day team considering stable vitals and symptoms at this time.

## 2024-07-12 NOTE — CARE PLAN
The patient's goals for the shift include      The clinical goals for the shift include patient will rate pain less than 5/10 by the end of shift    At around 4am, the patient desated to SpO2 of 64% without any symptom of distress on 6L high flow NC. O2 was increased to 10 and she was still in the 60s. Night team and Rapid response called to bedside. Patient placed on non-re breather and SpO2 increased to 100%. ABG and chest x-ray were done. Results confirmed history of COPD. The patient placed back on 8L high flow NC. She remained at 95% for the remainder of the shift. Patient weaned to 6L at 7am and remained as 93%. Zosyn and Vancomycin were ordered. Patient continued to remain safe and without injury. Patient continued to tolerate Remodulin and rated pain as 6/10 this morning.

## 2024-07-12 NOTE — PROGRESS NOTES
Bhargavi Isidro is a 74 y.o. female on day 8 of admission presenting with SDH (subdural hematoma) (Multi).    Subjective   Rapid called on patient at 0450 for O2  sat 65%. Patient was not in acute distress. Started on nonrebreather and O2 increased to 99%. AB.49 / 32 / 169 (had only been on NRB for a few minutes) and CXR with maybe increased LLL opacity but otherwise nothing significant. Patient was weaned to 8L and vanc/zosyn started for empiric coverage based on CXR. Night team noted her remodulin was paused temporarily several times throughout the night due to air in line.    This morning patient is doing well. Resting comfortably at baseline 6L. Denies chest pain, SOB, dizziness. States she has felt normal all night, even during rapid.          Objective     Physical Exam  Constitutional:       Appearance: Normal appearance.   Cardiovascular:      Rate and Rhythm: Normal rate and regular rhythm.      Pulses: Normal pulses.      Heart sounds: Normal heart sounds.   Pulmonary:      Effort: Pulmonary effort is normal.      Breath sounds: Normal breath sounds.   Abdominal:      General: Abdomen is flat.      Palpations: Abdomen is soft.   Musculoskeletal:      Right lower leg: Edema present.      Left lower leg: Edema present.   Skin:     General: Skin is warm and dry.      Comments: Extensive ecchymoses on face and extremities    Neurological:      General: No focal deficit present.      Mental Status: She is alert and oriented to person, place, and time.   Psychiatric:         Mood and Affect: Mood normal.         Behavior: Behavior normal.       Constitutional: No acute distress, resting comfortably in bed, cooperative  HEENT: Normocephalic, bandages on L face w/ multiple areas of bruising, PERRLA, EOMI, moist mucous membranes  Cardiovascular: RRR, normal S1/S2, no murmurs noted, has tunneled line R chest  Pulmonary: Clear to auscultation b/l, no wheezes/crackles/rhonchi, no increased work of breathing, on 6L  "NC  GI: Soft, non-tender, non-distended, normoactive bowel sounds  : No amrtinez catheter  Lower extremities: Warm and well perfused, trace b/l lower extremity edema  Neuro: A&O x3, able to move all 4 extremities spontaneously  Skin: Ecchymosis of L face and all four extremities  Psych: Appropriate mood and affect      Last Recorded Vitals  Blood pressure 105/56, pulse 89, temperature 36.7 °C (98.1 °F), temperature source Temporal, resp. rate 18, height 1.651 m (5' 5\"), weight 69.6 kg (153 lb 7 oz), SpO2 96%.  Intake/Output last 3 Shifts:  I/O last 3 completed shifts:  In: 164.7 (2.4 mL/kg) [I.V.:64.7 (0.9 mL/kg); IV Piggyback:100]  Out: 1560 (22.4 mL/kg) [Urine:1560 (0.6 mL/kg/hr)]  Weight: 69.6 kg       Assessment/Plan   Ms. Bhargavi Isidro is a 74yoF with PMHx of Afib (s/p PPM), CAD s/p CABG, COPD, chronic hypoxic respiratory failure (5-6L at rest, 10-15L exertion) 2/2 group I/II/III pHTN (on remodulin, sildenafil) [place] [facility] [place] a presumed mechanical [place] where she was found down at home. On admission found to have R SDH & L inferior pubic rami fracture c/b hematoma formation, s/p eliquis reversal with andexxa. No operative interventions following consultation by IR, Ortho, Neurosurgery. While in TICU pt was intermittently hypotension and receiving maintenance and bolus IVF, and subsequently had increasing O2 requirement to 15L NC at rest and therefore was transferred to MICU 7/5. Pt continued to be hypotensive in MICU & was started on levo (7/6 - 7/7) and dopamine (7/7 - 7/9) with c/f cardiogenic shock, sildenafil was held. Pt briefly on a lasix gtt 7/8 & then intermittently bolused until back on home 6L NC on 7/10. Pt's course c/b AMS (unclear etiology, repeat CTH w/ no change in SDH, but now mental status improved), DEBORA on CKD (pre-renal likely ischemic ATN I/s/o hypotension), and c/f UTI. Pt now stable for transfer to the floor 7/10.       For follow up:   [ ] Home meds being held: " buspar (for DEBORA), torsemide 40 BID, Kcl 40meq TID, sildenafil changed TID to BID inpt, mg oxide, allopurinol  [ ] Per NSGY can consider restarting eliquis post bleed day 14 (7/18)  [ ] CTH 2 wks post bleed (7/18), will be arranged by NSGY  [ ] Confirm new remodulin dose prior to discharge      #HFpEF  #Right sided heart failure  #Volume overload  # Cardiogenic shock? [RESOLVED]  - TTE (6/3/2024): LVEF of 55-60%, dilated RV with low normal RV systolic function and severe tricuspid regurgiation  - TTE (04Jul24): LVEF of 73%, with LV remodeling, Severely enlarged RV, Severely dilated RA, Severe Tricuspid regurgitation   - RV failure likely secondary to underlying pulmonary hypertension  - Patient has received continuous drips of LR and D10W and has received intermittent boluses since being admitted  - On levo (7/6 - 7/7) and dopamine (7/7 - 7/9) with c/f cardiogenic shock  - Briefly on a lasix gtt 7/8 & then intermittently bolused until back on home 6L NC on 7/10    PLAN:  -Strict I/Os & daily weights  -Start torsemide 20mg daily, (home dose: torsemide 40mg BID)  -Daily diuresis plan (pt could potentially start autodiuresing following ATN)    #Atrial fibrillation s/p ablation and PPM  - CHADS-VASC of 4, on eliquis  - S/p AV node ablation and PPM insertion  - Eliquis held I/s/o SDH, can restart post bleed day 14 (see above)  -Telemetry  -Per NSGY can consider restarting eliquis post bleed day 14 (7/18)    #CAD s/p CABG  - CABG done about 10 years ago  - No heart cath data in her chart  -Continue ASA 81mg     #Pulmonary HTN  - Has been thought to have group I, II, and III pulmonary hypertension at various times  - Most recent RHC (10/2022) significant for RAP 20 PAP 92/45 mPAP 61 PCWP 26 CO/CI (TD) 3.1/1.74 PVR 11.9 PaSat 57. Both pre and post-capillary hypertension  - RV dilation in TTE as above  - Patient prescribed 5-6LNC at rest, 10-15LNC on exertion but patient apparently only uses 6LNC and tolerates saturations in  the 70s  - On remodulin infusion & sildenafil  Plan:  -C/w sildenafil 20mg BID (briefly held during admission)  -C/w remodulin dosing modified to remodulin 39ng/kg/min-no more titration-will refer for premixed cassettes   -C/w prednisone 10mg daily. This is a chronic medication for previously suspected organizing pneumonia    #Acute on chronic kidney injury, stage I, oliguric  - Baseline creatinine of around 1.4-1.5  - FENa 0.4% indicating prerenal DEBORA  - Etiology CRS vs ischemic ATN I/s/o hypotension  - Renal u/s: chronic kidney disease no acute findings  Plan:  - Renal following  - Strict I/Os, daily RFPs    # Right sided subdural hematoma  - SDH seen on CT scan following mechanical fall in the setting of Eliquis use for A-fib  - Neurosurgery consulted, no operative management; signed off  - S/p reversal with Andexxa  - Repeat CT Head w/o contrast showed no significant changes in the size of the subdural hematoma  - Had AMS during MICU stay, now resolved  - Restarted dvt ppx on 7/8  - Per NSGY: hold aspirin until post bleed day 7 (7/11), hold eliquis until post bleed day 14 (7/18)  Plan:  -Continue home ASA 81mg  -Per NSGY can consider restarting eliquis post bleed day 14 (7/18)  -CTH 2 wks post bleed (7/18), will be arranged by NSGY    # C/f UTI  - No symptoms of dysuria but symptoms of reduced concentration and fatigue  - UA showing 11-20 WBC, 25 LE  - Blood cx 7/7: NGTD  - Urine cx 7/7; NGTD  -CTX 1g q24h (7/6 - 7/11)    #Left inferior pelvic ramus fracture  #Pelvic hematoma  - Occurred after fall  - No operative management per Ortho, no need for IR embolization  -Per Ortho, if she fails trial of ambulation she may be a candidate for operative intervention for pelvic pain relief  -Pain regimen: Tylenol 975mg TID, PRN Dilaudid 0.4mg q3h PRN, Lyrica 75mg BID, Robadin 500mg BID PRN    #Major depression  #Generalized anxiety disorder  PLAN:  -Hold home buspar I/s/o DEBORA  -Continue home lexapro 10 (unclear why  holding)  -C/w lamotrigine 100mg daily  -C/w Xanax 0.5mg daily PRN for anxiety    Diet: 2-3g Na  DVT ppx: SQH  O2: 6L NC  Code status: DNAR/DNI  Surrogate medical decision maker: Jaleesa Rondon (friend) [phone]       Julian Hedrick MD    ======  I saw and evaluated the patient. I personally obtained the key and critical portions of the history and physical exam or was physically present for key and critical portions performed by the resident/fellow. I reviewed the resident/fellow's documentation and discussed the patient with the resident/fellow. I agree with the resident/fellow's medical decision making as documented in the note.    Asher Oliver MD

## 2024-07-12 NOTE — SIGNIFICANT EVENT
RADAR Note     07/12/24 0939   Onset Documentation   Rapid Response Initiated By Radar auto page   Location/Room [facility] [place]   Pager Time [phone]   Arrival Time 0948   Event End Time 0958   Level II Called No   Primary Reason for Call Radar auto page     RADAR of 6 auto-generated page received by this here Rapid Response Team RN.  Bedside RN Katty notified via EPIC chat and VS reviewed.  Patient remains at baseline.  No interventions provided by this here Rapid Response RN.  RN encouraged to page Rapid Response if concerns in patient condition arise.

## 2024-07-12 NOTE — CONSULTS
Vancomycin Dosing by Pharmacy- INITIAL    Bhargavi Isidro is a 74 y.o. year old female who Pharmacy has been consulted for vancomycin dosing for pneumonia. Based on the patient's indication and renal status this patient will be dosed based on a goal trough/random level of 15-20.     Renal function is poor, received dialysis previously during this admission. Per nephrology note, continue to hold diuresis. Will dose by level.     Visit Vitals  /56   Pulse 89   Temp 36.7 °C (98.1 °F) (Temporal)   Resp 18        Lab Results   Component Value Date    CREATININE 1.91 (H) 07/10/2024    CREATININE 2.52 (H) 07/10/2024    CREATININE 3.16 (H) 2024    CREATININE 3.62 (H) 2024        Patient weight is as follows:   Vitals:    24 0406   Weight: 69.6 kg (153 lb 7 oz)       Cultures:  No results found for the encounter in last 14 days.        I/O last 3 completed shifts:  In: 196.7 (2.7 mL/kg) [I.V.:146.7 (2 mL/kg); IV Piggyback:50]  Out: 1760 (24.5 mL/kg) [Urine:1760 (0.7 mL/kg/hr)]  Weight: 71.7 kg   I/O during current shift:  I/O this shift:  In: 69.5 [I.V.:19.5; IV Piggyback:50]  Out: 450 [Urine:450]    Temp (24hrs), Av.6 °C (97.9 °F), Min:36.3 °C (97.3 °F), Max:36.9 °C (98.4 °F)         Assessment/Plan     Patient will be given a loading dose of 1750 mg once 7/12 AM.  Will initiate vancomycin dose by level due to renal function.  Follow-up level will be ordered on  with AM labs unless clinically indicated sooner.  Will continue to monitor renal function daily while on vancomycin and order serum creatinine at least every 48 hours if not already ordered.  Follow for continued vancomycin needs, clinical response, and signs/symptoms of toxicity.       Zachary Montgomery, PharmD

## 2024-07-13 LAB
ALBUMIN SERPL BCP-MCNC: 3 G/DL (ref 3.4–5)
ANION GAP SERPL CALC-SCNC: 11 MMOL/L (ref 10–20)
BASOPHILS # BLD MANUAL: 0.21 X10*3/UL (ref 0–0.1)
BASOPHILS NFR BLD MANUAL: 1.7 %
BUN SERPL-MCNC: 22 MG/DL (ref 6–23)
BURR CELLS BLD QL SMEAR: ABNORMAL
CALCIUM SERPL-MCNC: 7.8 MG/DL (ref 8.6–10.6)
CHLORIDE SERPL-SCNC: 103 MMOL/L (ref 98–107)
CO2 SERPL-SCNC: 25 MMOL/L (ref 21–32)
CREAT SERPL-MCNC: 1.05 MG/DL (ref 0.5–1.05)
EGFRCR SERPLBLD CKD-EPI 2021: 56 ML/MIN/1.73M*2
EOSINOPHIL # BLD MANUAL: 0.43 X10*3/UL (ref 0–0.4)
EOSINOPHIL NFR BLD MANUAL: 3.5 %
ERYTHROCYTE [DISTWIDTH] IN BLOOD BY AUTOMATED COUNT: 25.1 % (ref 11.5–14.5)
GLUCOSE SERPL-MCNC: 95 MG/DL (ref 74–99)
HCT VFR BLD AUTO: 28 % (ref 36–46)
HGB BLD-MCNC: 8.2 G/DL (ref 12–16)
IMM GRANULOCYTES # BLD AUTO: 0.29 X10*3/UL (ref 0–0.5)
IMM GRANULOCYTES NFR BLD AUTO: 2.4 % (ref 0–0.9)
LYMPHOCYTES # BLD MANUAL: 0.63 X10*3/UL (ref 0.8–3)
LYMPHOCYTES NFR BLD MANUAL: 5.2 %
MAGNESIUM SERPL-MCNC: 1.85 MG/DL (ref 1.6–2.4)
MCH RBC QN AUTO: 23.6 PG (ref 26–34)
MCHC RBC AUTO-ENTMCNC: 29.3 G/DL (ref 32–36)
MCV RBC AUTO: 81 FL (ref 80–100)
MONOCYTES # BLD MANUAL: 0.21 X10*3/UL (ref 0.05–0.8)
MONOCYTES NFR BLD MANUAL: 1.7 %
NEUTS SEG # BLD MANUAL: 10.72 X10*3/UL (ref 1.6–5)
NEUTS SEG NFR BLD MANUAL: 87.9 %
NRBC BLD-RTO: 0.2 /100 WBCS (ref 0–0)
PHOSPHATE SERPL-MCNC: 2.1 MG/DL (ref 2.5–4.9)
PLATELET # BLD AUTO: 185 X10*3/UL (ref 150–450)
POLYCHROMASIA BLD QL SMEAR: ABNORMAL
POTASSIUM SERPL-SCNC: 3.4 MMOL/L (ref 3.5–5.3)
RBC # BLD AUTO: 3.47 X10*6/UL (ref 4–5.2)
RBC MORPH BLD: ABNORMAL
SODIUM SERPL-SCNC: 136 MMOL/L (ref 136–145)
STAPHYLOCOCCUS SPEC CULT: NORMAL
TOTAL CELLS COUNTED BLD: 116
WBC # BLD AUTO: 12.2 X10*3/UL (ref 4.4–11.3)

## 2024-07-13 PROCEDURE — 2500000001 HC RX 250 WO HCPCS SELF ADMINISTERED DRUGS (ALT 637 FOR MEDICARE OP)

## 2024-07-13 PROCEDURE — 2500000002 HC RX 250 W HCPCS SELF ADMINISTERED DRUGS (ALT 637 FOR MEDICARE OP, ALT 636 FOR OP/ED)

## 2024-07-13 PROCEDURE — 1200000002 HC GENERAL ROOM WITH TELEMETRY DAILY

## 2024-07-13 PROCEDURE — 85027 COMPLETE CBC AUTOMATED: CPT

## 2024-07-13 PROCEDURE — 2500000004 HC RX 250 GENERAL PHARMACY W/ HCPCS (ALT 636 FOR OP/ED)

## 2024-07-13 PROCEDURE — 83735 ASSAY OF MAGNESIUM: CPT

## 2024-07-13 PROCEDURE — 80069 RENAL FUNCTION PANEL: CPT

## 2024-07-13 PROCEDURE — 99233 SBSQ HOSP IP/OBS HIGH 50: CPT | Performed by: INTERNAL MEDICINE

## 2024-07-13 PROCEDURE — 85007 BL SMEAR W/DIFF WBC COUNT: CPT

## 2024-07-13 RX ORDER — POTASSIUM CHLORIDE 20 MEQ/1
20 TABLET, EXTENDED RELEASE ORAL ONCE
Status: COMPLETED | OUTPATIENT
Start: 2024-07-13 | End: 2024-07-13

## 2024-07-13 RX ORDER — SODIUM,POTASSIUM PHOSPHATES 280-250MG
1 POWDER IN PACKET (EA) ORAL 4 TIMES DAILY
Status: COMPLETED | OUTPATIENT
Start: 2024-07-13 | End: 2024-07-13

## 2024-07-13 RX ADMIN — HYDROMORPHONE HYDROCHLORIDE 0.4 MG: 1 INJECTION, SOLUTION INTRAMUSCULAR; INTRAVENOUS; SUBCUTANEOUS at 13:16

## 2024-07-13 RX ADMIN — PANTOPRAZOLE SODIUM 40 MG: 40 TABLET, DELAYED RELEASE ORAL at 06:34

## 2024-07-13 RX ADMIN — ACETAMINOPHEN 975 MG: 325 TABLET ORAL at 21:17

## 2024-07-13 RX ADMIN — HYDROMORPHONE HYDROCHLORIDE 0.4 MG: 1 INJECTION, SOLUTION INTRAMUSCULAR; INTRAVENOUS; SUBCUTANEOUS at 05:54

## 2024-07-13 RX ADMIN — ACETAMINOPHEN 975 MG: 325 TABLET ORAL at 08:33

## 2024-07-13 RX ADMIN — POTASSIUM & SODIUM PHOSPHATES POWDER PACK 280-160-250 MG 1 PACKET: 280-160-250 PACK at 17:41

## 2024-07-13 RX ADMIN — LOPERAMIDE HYDROCHLORIDE 2 MG: 2 CAPSULE ORAL at 21:22

## 2024-07-13 RX ADMIN — POTASSIUM PHOSPHATE, MONOBASIC 500 MG: 500 TABLET, SOLUBLE ORAL at 06:58

## 2024-07-13 RX ADMIN — TORSEMIDE 20 MG: 20 TABLET ORAL at 08:33

## 2024-07-13 RX ADMIN — ACETAMINOPHEN 975 MG: 325 TABLET ORAL at 16:11

## 2024-07-13 RX ADMIN — LEVETIRACETAM 500 MG: 500 TABLET, FILM COATED ORAL at 08:33

## 2024-07-13 RX ADMIN — PREDNISONE 10 MG: 20 TABLET ORAL at 08:34

## 2024-07-13 RX ADMIN — LEVETIRACETAM 500 MG: 500 TABLET, FILM COATED ORAL at 21:17

## 2024-07-13 RX ADMIN — LOPERAMIDE HYDROCHLORIDE 2 MG: 2 CAPSULE ORAL at 08:33

## 2024-07-13 RX ADMIN — SILDENAFIL 20 MG: 20 TABLET ORAL at 08:33

## 2024-07-13 RX ADMIN — HEPARIN SODIUM 5000 UNITS: 5000 INJECTION INTRAVENOUS; SUBCUTANEOUS at 05:54

## 2024-07-13 RX ADMIN — POTASSIUM & SODIUM PHOSPHATES POWDER PACK 280-160-250 MG 1 PACKET: 280-160-250 PACK at 16:10

## 2024-07-13 RX ADMIN — HYDROMORPHONE HYDROCHLORIDE 0.4 MG: 1 INJECTION, SOLUTION INTRAMUSCULAR; INTRAVENOUS; SUBCUTANEOUS at 19:40

## 2024-07-13 RX ADMIN — PREGABALIN 75 MG: 25 CAPSULE ORAL at 21:17

## 2024-07-13 RX ADMIN — SILDENAFIL 20 MG: 20 TABLET ORAL at 21:17

## 2024-07-13 RX ADMIN — HEPARIN SODIUM 5000 UNITS: 5000 INJECTION INTRAVENOUS; SUBCUTANEOUS at 21:16

## 2024-07-13 RX ADMIN — LAMOTRIGINE 100 MG: 100 TABLET ORAL at 08:34

## 2024-07-13 RX ADMIN — PREGABALIN 75 MG: 25 CAPSULE ORAL at 08:33

## 2024-07-13 RX ADMIN — LOPERAMIDE HYDROCHLORIDE 2 MG: 2 CAPSULE ORAL at 16:11

## 2024-07-13 RX ADMIN — TREPROSTINIL 39 NG/KG/MIN: 5 INJECTION, SOLUTION INTRAVENOUS; SUBCUTANEOUS at 23:01

## 2024-07-13 RX ADMIN — ESCITALOPRAM 10 MG: 10 TABLET, FILM COATED ORAL at 08:34

## 2024-07-13 RX ADMIN — HEPARIN SODIUM 5000 UNITS: 5000 INJECTION INTRAVENOUS; SUBCUTANEOUS at 13:15

## 2024-07-13 RX ADMIN — POTASSIUM CHLORIDE 20 MEQ: 1500 TABLET, EXTENDED RELEASE ORAL at 14:00

## 2024-07-13 RX ADMIN — ASPIRIN 81 MG: 81 TABLET, COATED ORAL at 08:33

## 2024-07-13 ASSESSMENT — PAIN - FUNCTIONAL ASSESSMENT
PAIN_FUNCTIONAL_ASSESSMENT: 0-10

## 2024-07-13 ASSESSMENT — PAIN SCALES - GENERAL
PAINLEVEL_OUTOF10: 0 - NO PAIN
PAINLEVEL_OUTOF10: 0 - NO PAIN
PAINLEVEL_OUTOF10: 8
PAINLEVEL_OUTOF10: 9

## 2024-07-13 ASSESSMENT — COGNITIVE AND FUNCTIONAL STATUS - GENERAL
DRESSING REGULAR LOWER BODY CLOTHING: A LOT
DRESSING REGULAR UPPER BODY CLOTHING: A LOT
DRESSING REGULAR UPPER BODY CLOTHING: A LOT
MOBILITY SCORE: 9
MOVING FROM LYING ON BACK TO SITTING ON SIDE OF FLAT BED WITH BEDRAILS: A LITTLE
CLIMB 3 TO 5 STEPS WITH RAILING: TOTAL
TURNING FROM BACK TO SIDE WHILE IN FLAT BAD: A LOT
HELP NEEDED FOR BATHING: A LOT
PERSONAL GROOMING: A LOT
MOVING FROM LYING ON BACK TO SITTING ON SIDE OF FLAT BED WITH BEDRAILS: A LITTLE
WALKING IN HOSPITAL ROOM: TOTAL
STANDING UP FROM CHAIR USING ARMS: TOTAL
MOVING TO AND FROM BED TO CHAIR: TOTAL
DAILY ACTIVITIY SCORE: 14
TOILETING: A LOT
DAILY ACTIVITIY SCORE: 14
HELP NEEDED FOR BATHING: A LOT
MOBILITY SCORE: 9
CLIMB 3 TO 5 STEPS WITH RAILING: TOTAL
PERSONAL GROOMING: A LOT
MOVING TO AND FROM BED TO CHAIR: TOTAL
TURNING FROM BACK TO SIDE WHILE IN FLAT BAD: A LOT
TOILETING: A LOT
WALKING IN HOSPITAL ROOM: TOTAL
STANDING UP FROM CHAIR USING ARMS: TOTAL
DRESSING REGULAR LOWER BODY CLOTHING: A LOT

## 2024-07-13 NOTE — CARE PLAN
The patient's goals for the shift include      The clinical goals for the shift include patient's O will remain >88 throughout shift    Over the shift, the patient remained safe and without injury. Her sat were in the 90s throughout the shift. She continued to received Dilaudid PRN through the shift.

## 2024-07-13 NOTE — PROGRESS NOTES
"Bhargavi Isidro is a 74 y.o. female on day 9 of admission presenting with SDH (subdural hematoma) (Multi).    Subjective   - Afebrile, NAEON  - Pt resting comfortably at baseline 6L   - Denies any SOB, chest pain, dizziness       Objective   Physical Exam  CONSTITUTIONAL: NAD, alert, and cooperative  SKIN: Ecchymoses on L face and all four extremities  RESPIRATORY/THORAX: Lungs CTA bilat, unlabored breathing, and good air movement  CARDIOVASCULAR: RRR, no murmurs, clicks, rubs, gallops appreciated    ABDOMEN: Abdomen is soft, non-distended, non-tender  EXTREMITIES: B/l LE edema (L >R)   NEUROLOGICAL: Awake, alert, oriented times three. Mentation lucid, speech clear, no focal deficits.  PSYCH: Speech fluent and not pressured. No tangential thought or hallucination, no psychomotor agitation or retardation. Good eye contact, appropriate affect. Insight normal    Last Recorded Vitals  Blood pressure 103/56, pulse 90, temperature 36.2 °C (97.2 °F), resp. rate 20, height 1.651 m (5' 5\"), weight 69.4 kg (153 lb), SpO2 95%.  Intake/Output last 3 Shifts:  I/O last 3 completed shifts:  In: 174.6 (2.5 mL/kg) [I.V.:74.6 (1.1 mL/kg); IV Piggyback:100]  Out: 1700 (24.5 mL/kg) [Urine:1700 (0.7 mL/kg/hr)]  Weight: 69.4 kg       Assessment/Plan   Ms. Bhargavi Isidro is a 74yoF with PMHx of Afib (s/p PPM), CAD s/p CABG, COPD, chronic hypoxic respiratory failure (5-6L at rest, 10-15L exertion) 2/2 group I/II/III pHTN (on remodulin, sildenafil) who originally presented to [facility] [place] on 7/4 after a presumed mechanical fall where she was found down at home. On admission found to have R SDH & L inferior pubic rami fracture c/b hematoma formation, s/p eliquis reversal with andexxa. No operative interventions following consultation by IR, Ortho, Neurosurgery. While in TICU pt was intermittently hypotension and receiving maintenance and bolus IVF, and subsequently had increasing O2 requirement to 15L NC at rest and therefore was transferred to MICU " 7/5. Pt continued to be hypotensive in MICU & was started on levo (7/6 - 7/7) and dopamine (7/7 - 7/9) with c/f cardiogenic shock, sildenafil was held. Pt briefly on a lasix gtt 7/8 & then intermittently bolused until back on home 6L NC on 7/10. Pt's course c/b AMS (unclear etiology, repeat CTH w/ no change in SDH, but now mental status improved), DEBORA on CKD (pre-renal likely ischemic ATN I/s/o hypotension), and c/f UTI. Pt now stable for transfer to the floor 7/10.     For follow up:   [ ] Home meds being held: buspar (for DEBORA), torsemide 40 BID, Kcl 40mEq TID, sildenafil changed TID to BID inpt, mg oxide, allopurinol  [ ] Per NSGY can consider restarting eliquis post bleed day 14 (7/18)  [ ] CTH 2 wks post bleed (7/18), will be arranged by NSGY  [ ] Confirm new remodulin dose prior to discharge  [ ] Keppra stop date     #HFpEF  #Right sided heart failure  #Volume overload  #Cardiogenic shock? [RESOLVED]  - TTE (6/3/2024): LVEF of 55-60%, dilated RV with low normal RV systolic function and severe tricuspid regurgiation  - TTE (04Jul24): LVEF of 73%, with LV remodeling, Severely enlarged RV, Severely dilated RA, Severe Tricuspid regurgitation   - RV failure likely secondary to underlying pulmonary hypertension  - Patient has received continuous drips of LR and D10W and has received intermittent boluses since being admitted  - On levo (7/6 - 7/7) and dopamine (7/7 - 7/9) with c/f cardiogenic shock  - Briefly on a lasix gtt 7/8 & then intermittently bolused until back on home 6L NC on 7/10  Plan:  - Strict I/Os & daily weights  - Continue torsemide 20mg daily, (home dose: torsemide 40mg BID)  - Daily diuresis plan (pt could potentially start autodiuresing following ATN)    #Atrial fibrillation s/p ablation and PPM  - CHADS-VASC of 4, on eliquis  - S/p AV node ablation and PPM insertion  - Eliquis held i/s/o SDH, can restart post bleed day 14 (see above)  - Telemetry  - Per NSGY can consider restarting eliquis post  bleed day 14 (7/18)    #CAD s/p CABG  - CABG done about 10 years ago  - No heart cath data in her chart  - Continue ASA 81mg     #Pulmonary HTN  - Has been thought to have group I, II, and III pulmonary hypertension at various times  - Most recent RHC (10/2022) significant for RAP 20 PAP 92/45 mPAP 61 PCWP 26 CO/CI (TD) 3.1/1.74 PVR 11.9 PaSat 57. Both pre and post-capillary hypertension  - RV dilation in TTE as above  - Patient prescribed 5-6LNC at rest, 10-15LNC on exertion but patient apparently only uses 6LNC and tolerates saturations in the 70s  - On remodulin infusion & sildenafil  Plan:  - C/w sildenafil 20mg BID (briefly held during admission)  - C/w remodulin dosing modified to remodulin 39ng/kg/min-no more titration-will refer for premixed cassettes   - C/w prednisone 10mg daily. This is a chronic medication for previously suspected organizing pneumonia    #Acute on chronic kidney injury, stage I, oliguric  - Baseline creatinine of around 1.4-1.5  - FENa 0.4% indicating prerenal DEBORA  - Etiology CRS vs ischemic ATN I/s/o hypotension  - Renal u/s: chronic kidney disease no acute findings  Plan:  - Strict I/Os, daily RFPs    # Right sided subdural hematoma  - SDH seen on CT scan following mechanical fall in the setting of Eliquis use for A-fib  - Neurosurgery consulted, no operative management; signed off  - S/p reversal with Andexxa  - Repeat CT Head w/o contrast showed no significant changes in the size of the subdural hematoma  - Had AMS during MICU stay, now resolved  - Restarted dvt ppx on 7/8  - Per NSGY: hold aspirin until post bleed day 7 (7/11), hold eliquis until post bleed day 14 (7/18)  Plan:  - Continue home ASA 81mg  - Per NSGY can consider restarting eliquis post bleed day 14 (7/18)  - CTH 2 wks post bleed (7/18), will be arranged by NSGY  - Continue Keppra 500mg BID for seizure prophylaxis, decide on stop date    # C/f UTI  - No symptoms of dysuria but symptoms of reduced concentration and  fatigue  - UA showing 11-20 WBC, 25 LE  - Blood cx 7/7: NGTD  - Urine cx 7/7; NGTD  - CTX 1g q24h (7/6 - 7/11)    #Left inferior pelvic ramus fracture  #Pelvic hematoma  - Occurred after fall  - No operative management per Ortho, no need for IR embolization  - Per Ortho, if she fails trial of ambulation she may be a candidate for operative intervention for pelvic pain relief  - Pain regimen: Tylenol 975mg TID, PRN Dilaudid 0.4mg q3h PRN, Lyrica 75mg BID, Robaxin 500mg BID PRN    #Major depression  #Generalized anxiety disorder  PLAN:  - Hold home buspar i/s/o DEBORA  - Continue home lexapro 10  - C/w lamotrigine 100mg daily  - C/w Xanax 0.5mg daily PRN for anxiety    Diet: 2-3g Na  DVT ppx: SQH  O2: 6L NC  Code status: DNAR/DNI  Surrogate medical decision maker: Jaleesa Nela (friend) [phone]     Cirilo Roberts MS4    ======  I, or a resident under my supervision, was present with the medical student who participated in the documentation of this note.  I have personally seen and examined the patient and performed the medical decision-making components. I have reviewed the medical student documentation and/or resident documentation and verified the findings in the note as written with additions or exceptions as stated in the body of the note.    Asher Oliver MD

## 2024-07-14 VITALS
TEMPERATURE: 98.1 F | BODY MASS INDEX: 25.53 KG/M2 | SYSTOLIC BLOOD PRESSURE: 106 MMHG | WEIGHT: 153.22 LBS | OXYGEN SATURATION: 92 % | HEIGHT: 65 IN | RESPIRATION RATE: 18 BRPM | DIASTOLIC BLOOD PRESSURE: 62 MMHG | HEART RATE: 87 BPM

## 2024-07-14 LAB
ACANTHOCYTES BLD QL SMEAR: ABNORMAL
ALBUMIN SERPL BCP-MCNC: 2.7 G/DL (ref 3.4–5)
ANION GAP SERPL CALC-SCNC: 16 MMOL/L (ref 10–20)
BASOPHILS # BLD MANUAL: 0 X10*3/UL (ref 0–0.1)
BASOPHILS NFR BLD MANUAL: 0 %
BUN SERPL-MCNC: 21 MG/DL (ref 6–23)
BURR CELLS BLD QL SMEAR: ABNORMAL
CALCIUM SERPL-MCNC: 7.1 MG/DL (ref 8.6–10.6)
CHLORIDE SERPL-SCNC: 104 MMOL/L (ref 98–107)
CO2 SERPL-SCNC: 20 MMOL/L (ref 21–32)
CREAT SERPL-MCNC: 1.19 MG/DL (ref 0.5–1.05)
EGFRCR SERPLBLD CKD-EPI 2021: 48 ML/MIN/1.73M*2
EOSINOPHIL # BLD MANUAL: 0.11 X10*3/UL (ref 0–0.4)
EOSINOPHIL NFR BLD MANUAL: 0.8 %
ERYTHROCYTE [DISTWIDTH] IN BLOOD BY AUTOMATED COUNT: 26.5 % (ref 11.5–14.5)
GLUCOSE SERPL-MCNC: 119 MG/DL (ref 74–99)
HCT VFR BLD AUTO: 30.4 % (ref 36–46)
HGB BLD-MCNC: 8.3 G/DL (ref 12–16)
HYPOCHROMIA BLD QL SMEAR: ABNORMAL
IMM GRANULOCYTES # BLD AUTO: 0.23 X10*3/UL (ref 0–0.5)
IMM GRANULOCYTES NFR BLD AUTO: 1.7 % (ref 0–0.9)
LYMPHOCYTES # BLD MANUAL: 0.12 X10*3/UL (ref 0.8–3)
LYMPHOCYTES NFR BLD MANUAL: 0.9 %
MAGNESIUM SERPL-MCNC: 1.72 MG/DL (ref 1.6–2.4)
MCH RBC QN AUTO: 24.4 PG (ref 26–34)
MCHC RBC AUTO-ENTMCNC: 27.3 G/DL (ref 32–36)
MCV RBC AUTO: 89 FL (ref 80–100)
MONOCYTES # BLD MANUAL: 0.12 X10*3/UL (ref 0.05–0.8)
MONOCYTES NFR BLD MANUAL: 0.9 %
NEUTS SEG # BLD MANUAL: 13.15 X10*3/UL (ref 1.6–5)
NEUTS SEG NFR BLD MANUAL: 97.4 %
NRBC BLD-RTO: 0.2 /100 WBCS (ref 0–0)
OVALOCYTES BLD QL SMEAR: ABNORMAL
PHOSPHATE SERPL-MCNC: 2.7 MG/DL (ref 2.5–4.9)
PLATELET # BLD AUTO: 201 X10*3/UL (ref 150–450)
POTASSIUM SERPL-SCNC: 3.8 MMOL/L (ref 3.5–5.3)
RBC # BLD AUTO: 3.4 X10*6/UL (ref 4–5.2)
RBC MORPH BLD: ABNORMAL
SCHISTOCYTES BLD QL SMEAR: ABNORMAL
SODIUM SERPL-SCNC: 136 MMOL/L (ref 136–145)
TOTAL CELLS COUNTED BLD: 115
WBC # BLD AUTO: 13.5 X10*3/UL (ref 4.4–11.3)

## 2024-07-14 PROCEDURE — 2500000002 HC RX 250 W HCPCS SELF ADMINISTERED DRUGS (ALT 637 FOR MEDICARE OP, ALT 636 FOR OP/ED)

## 2024-07-14 PROCEDURE — 85027 COMPLETE CBC AUTOMATED: CPT

## 2024-07-14 PROCEDURE — 2500000005 HC RX 250 GENERAL PHARMACY W/O HCPCS

## 2024-07-14 PROCEDURE — 2500000001 HC RX 250 WO HCPCS SELF ADMINISTERED DRUGS (ALT 637 FOR MEDICARE OP)

## 2024-07-14 PROCEDURE — 83735 ASSAY OF MAGNESIUM: CPT

## 2024-07-14 PROCEDURE — 99233 SBSQ HOSP IP/OBS HIGH 50: CPT | Performed by: INTERNAL MEDICINE

## 2024-07-14 PROCEDURE — 1200000002 HC GENERAL ROOM WITH TELEMETRY DAILY

## 2024-07-14 PROCEDURE — 2500000004 HC RX 250 GENERAL PHARMACY W/ HCPCS (ALT 636 FOR OP/ED)

## 2024-07-14 PROCEDURE — 85007 BL SMEAR W/DIFF WBC COUNT: CPT

## 2024-07-14 PROCEDURE — 80069 RENAL FUNCTION PANEL: CPT

## 2024-07-14 RX ORDER — CALCIUM CARBONATE 200(500)MG
500 TABLET,CHEWABLE ORAL ONCE
Status: COMPLETED | OUTPATIENT
Start: 2024-07-14 | End: 2024-07-14

## 2024-07-14 RX ORDER — TORSEMIDE 20 MG/1
20 TABLET ORAL ONCE
Status: COMPLETED | OUTPATIENT
Start: 2024-07-14 | End: 2024-07-14

## 2024-07-14 RX ORDER — LEVETIRACETAM 500 MG/1
500 TABLET ORAL 2 TIMES DAILY
Qty: 18 TABLET | Refills: 0 | Status: CANCELLED | OUTPATIENT
Start: 2024-07-14

## 2024-07-14 RX ORDER — ASPIRIN 81 MG/1
81 TABLET ORAL DAILY
Status: CANCELLED
Start: 2024-07-15

## 2024-07-14 RX ADMIN — LEVETIRACETAM 500 MG: 500 TABLET, FILM COATED ORAL at 08:21

## 2024-07-14 RX ADMIN — LOPERAMIDE HYDROCHLORIDE 2 MG: 2 CAPSULE ORAL at 08:21

## 2024-07-14 RX ADMIN — ACETAMINOPHEN 975 MG: 325 TABLET ORAL at 21:02

## 2024-07-14 RX ADMIN — SILDENAFIL 20 MG: 20 TABLET ORAL at 21:02

## 2024-07-14 RX ADMIN — CARBOXYMETHYLCELLULOSE SODIUM 1 DROP: 5 SOLUTION/ DROPS OPHTHALMIC at 23:15

## 2024-07-14 RX ADMIN — LEVETIRACETAM 500 MG: 500 TABLET, FILM COATED ORAL at 21:02

## 2024-07-14 RX ADMIN — TREPROSTINIL 39 NG/KG/MIN: 5 INJECTION, SOLUTION INTRAVENOUS; SUBCUTANEOUS at 21:00

## 2024-07-14 RX ADMIN — HEPARIN SODIUM 5000 UNITS: 5000 INJECTION INTRAVENOUS; SUBCUTANEOUS at 23:15

## 2024-07-14 RX ADMIN — HEPARIN SODIUM 5000 UNITS: 5000 INJECTION INTRAVENOUS; SUBCUTANEOUS at 05:51

## 2024-07-14 RX ADMIN — PREDNISONE 10 MG: 20 TABLET ORAL at 08:21

## 2024-07-14 RX ADMIN — PREGABALIN 75 MG: 25 CAPSULE ORAL at 08:21

## 2024-07-14 RX ADMIN — CARBOXYMETHYLCELLULOSE SODIUM 1 DROP: 5 SOLUTION/ DROPS OPHTHALMIC at 18:00

## 2024-07-14 RX ADMIN — HEPARIN SODIUM 5000 UNITS: 5000 INJECTION INTRAVENOUS; SUBCUTANEOUS at 16:14

## 2024-07-14 RX ADMIN — TORSEMIDE 20 MG: 20 TABLET ORAL at 08:20

## 2024-07-14 RX ADMIN — TORSEMIDE 20 MG: 20 TABLET ORAL at 16:14

## 2024-07-14 RX ADMIN — LOPERAMIDE HYDROCHLORIDE 2 MG: 2 CAPSULE ORAL at 16:15

## 2024-07-14 RX ADMIN — LAMOTRIGINE 100 MG: 100 TABLET ORAL at 08:21

## 2024-07-14 RX ADMIN — CARBOXYMETHYLCELLULOSE SODIUM 1 DROP: 5 SOLUTION/ DROPS OPHTHALMIC at 16:15

## 2024-07-14 RX ADMIN — HYDROMORPHONE HYDROCHLORIDE 0.4 MG: 1 INJECTION, SOLUTION INTRAMUSCULAR; INTRAVENOUS; SUBCUTANEOUS at 16:11

## 2024-07-14 RX ADMIN — CARBOXYMETHYLCELLULOSE SODIUM 1 DROP: 5 SOLUTION/ DROPS OPHTHALMIC at 21:02

## 2024-07-14 RX ADMIN — PANTOPRAZOLE SODIUM 40 MG: 40 TABLET, DELAYED RELEASE ORAL at 05:52

## 2024-07-14 RX ADMIN — CALCIUM CARBONATE (ANTACID) CHEW TAB 500 MG 500 MG: 500 CHEW TAB at 16:14

## 2024-07-14 RX ADMIN — CARBOXYMETHYLCELLULOSE SODIUM 1 DROP: 5 SOLUTION/ DROPS OPHTHALMIC at 08:21

## 2024-07-14 RX ADMIN — ESCITALOPRAM 10 MG: 10 TABLET, FILM COATED ORAL at 08:21

## 2024-07-14 RX ADMIN — HYDROMORPHONE HYDROCHLORIDE 0.4 MG: 1 INJECTION, SOLUTION INTRAMUSCULAR; INTRAVENOUS; SUBCUTANEOUS at 08:36

## 2024-07-14 RX ADMIN — ACETAMINOPHEN 975 MG: 325 TABLET ORAL at 16:14

## 2024-07-14 RX ADMIN — ASPIRIN 81 MG: 81 TABLET, COATED ORAL at 08:20

## 2024-07-14 RX ADMIN — HYDROMORPHONE HYDROCHLORIDE 0.4 MG: 1 INJECTION, SOLUTION INTRAMUSCULAR; INTRAVENOUS; SUBCUTANEOUS at 21:19

## 2024-07-14 RX ADMIN — PREGABALIN 75 MG: 25 CAPSULE ORAL at 21:02

## 2024-07-14 RX ADMIN — ACETAMINOPHEN 975 MG: 325 TABLET ORAL at 08:20

## 2024-07-14 RX ADMIN — LOPERAMIDE HYDROCHLORIDE 2 MG: 2 CAPSULE ORAL at 21:19

## 2024-07-14 RX ADMIN — SILDENAFIL 20 MG: 20 TABLET ORAL at 08:21

## 2024-07-14 ASSESSMENT — COGNITIVE AND FUNCTIONAL STATUS - GENERAL
CLIMB 3 TO 5 STEPS WITH RAILING: TOTAL
MOVING FROM LYING ON BACK TO SITTING ON SIDE OF FLAT BED WITH BEDRAILS: A LITTLE
DRESSING REGULAR LOWER BODY CLOTHING: A LOT
TOILETING: A LOT
DRESSING REGULAR UPPER BODY CLOTHING: A LOT
PERSONAL GROOMING: A LOT
MOBILITY SCORE: 9
DRESSING REGULAR UPPER BODY CLOTHING: A LOT
DAILY ACTIVITIY SCORE: 14
STANDING UP FROM CHAIR USING ARMS: TOTAL
MOVING TO AND FROM BED TO CHAIR: TOTAL
CLIMB 3 TO 5 STEPS WITH RAILING: TOTAL
STANDING UP FROM CHAIR USING ARMS: TOTAL
DRESSING REGULAR LOWER BODY CLOTHING: A LOT
HELP NEEDED FOR BATHING: A LOT
DAILY ACTIVITIY SCORE: 14
TURNING FROM BACK TO SIDE WHILE IN FLAT BAD: A LOT
WALKING IN HOSPITAL ROOM: TOTAL
WALKING IN HOSPITAL ROOM: TOTAL
TURNING FROM BACK TO SIDE WHILE IN FLAT BAD: A LOT
PERSONAL GROOMING: A LOT
TOILETING: A LOT
MOVING FROM LYING ON BACK TO SITTING ON SIDE OF FLAT BED WITH BEDRAILS: A LITTLE
HELP NEEDED FOR BATHING: A LOT
MOVING TO AND FROM BED TO CHAIR: TOTAL
MOBILITY SCORE: 9

## 2024-07-14 ASSESSMENT — PAIN - FUNCTIONAL ASSESSMENT
PAIN_FUNCTIONAL_ASSESSMENT: 0-10
PAIN_FUNCTIONAL_ASSESSMENT: UNABLE TO SELF-REPORT
PAIN_FUNCTIONAL_ASSESSMENT: 0-10

## 2024-07-14 ASSESSMENT — PAIN DESCRIPTION - ORIENTATION: ORIENTATION: LEFT

## 2024-07-14 ASSESSMENT — PAIN DESCRIPTION - LOCATION: LOCATION: OTHER (COMMENT)

## 2024-07-14 ASSESSMENT — PAIN SCALES - GENERAL
PAINLEVEL_OUTOF10: 0 - NO PAIN
PAINLEVEL_OUTOF10: 9
PAINLEVEL_OUTOF10: 0 - NO PAIN
PAINLEVEL_OUTOF10: 0 - NO PAIN
PAINLEVEL_OUTOF10: 9
PAINLEVEL_OUTOF10: 9

## 2024-07-14 ASSESSMENT — PAIN DESCRIPTION - DESCRIPTORS: DESCRIPTORS: ACHING;SORE

## 2024-07-14 NOTE — PROGRESS NOTES
"Bhargavi Isidro is a 74 y.o. female on day 10 of admission presenting with SDH (subdural hematoma) (Multi).    Subjective   - Afebrile, NAEON  - Pt resting comfortably at baseline 6L   - Denies any SOB, chest pain, dizziness       Objective   Physical Exam  CONSTITUTIONAL: NAD, alert, and cooperative  SKIN: Ecchymoses on L face and all four extremities  RESPIRATORY/THORAX: Lungs CTA bilat, unlabored breathing, and good air movement  CARDIOVASCULAR: RRR, no murmurs, clicks, rubs, gallops appreciated    ABDOMEN: Abdomen is soft, non-distended, non-tender  EXTREMITIES: B/l LE edema (L >R)   NEUROLOGICAL: Awake, alert, oriented times three. Mentation lucid, speech clear, no focal deficits.  PSYCH: Speech fluent and not pressured. No tangential thought or hallucination, no psychomotor agitation or retardation. Good eye contact, appropriate affect. Insight normal    Last Recorded Vitals  Blood pressure 109/67, pulse 86, temperature 36.5 °C (97.7 °F), temperature source Temporal, resp. rate 20, height 1.651 m (5' 5\"), weight 69.5 kg (153 lb 3.5 oz), SpO2 91%.  Intake/Output last 3 Shifts:  I/O last 3 completed shifts:  In: 767 (11 mL/kg) [P.O.:720; I.V.:47 (0.7 mL/kg)]  Out: 3000 (43.2 mL/kg) [Urine:3000 (1.2 mL/kg/hr)]  Weight: 69.5 kg       Assessment/Plan   Ms. Bhargavi Isidro is a 74yoF with PMHx of Afib (s/p PPM), CAD s/p CABG, COPD, chronic hypoxic respiratory failure (5-6L at rest, 10-15L exertion) 2/2 group I/II/III pHTN (on remodulin, sildenafil) who originally presented to [facility] on 7/4 after a presumed mechanical fall where she was found down at home. On admission found to have R SDH & L inferior pubic rami fracture c/b hematoma formation, s/p eliquis reversal with andexxa. No operative interventions following consultation by IR, Ortho, Neurosurgery. While in TICU pt was intermittently hypotension and receiving maintenance and bolus IVF, and subsequently had increasing O2 requirement to 15L NC at rest and therefore was " transferred to MICU 7/5. Pt continued to be hypotensive in MICU & was started on levo (7/6 - 7/7) and dopamine (7/7 - 7/9) with c/f cardiogenic shock, sildenafil was held. Pt briefly on a lasix gtt 7/8 & then intermittently bolused until back on home 6L NC on 7/10. Pt's course c/b AMS (unclear etiology, repeat CTH w/ no change in SDH, but now mental status improved), DEBORA on CKD (pre-renal likely ischemic ATN I/s/o hypotension), and c/f UTI. Pt now stable for transfer to the floor 7/10.     For follow up:   [ ] Home meds being held: buspar (for DEBORA), torsemide 40 BID, Kcl 40mEq TID, sildenafil changed TID to BID inpt, mg oxide, allopurinol  [ ] Per NSGY can consider restarting eliquis post bleed day 14 (7/18)  [ ] CTH 2 wks post bleed (7/18), will be arranged by NSGY  [ ] Confirm new remodulin dose prior to discharge  [ ] Keppra stop date     #HFpEF  #Right sided heart failure  #Volume overload  #Cardiogenic shock? [RESOLVED]  - TTE (6/3/2024): LVEF of 55-60%, dilated RV with low normal RV systolic function and severe tricuspid regurgiation  - TTE (04Jul24): LVEF of 73%, with LV remodeling, Severely enlarged RV, Severely dilated RA, Severe Tricuspid regurgitation   - RV failure likely secondary to underlying pulmonary hypertension  - Patient has received continuous drips of LR and D10W and has received intermittent boluses since being admitted  - On levo (7/6 - 7/7) and dopamine (7/7 - 7/9) with c/f cardiogenic shock  - Briefly on a lasix gtt 7/8 & then intermittently bolused until back on home 6L NC on 7/10  Plan:  - Strict I/Os & daily weights  - Continue torsemide 20mg daily, (home dose: torsemide 40mg BID)  - Daily diuresis plan (pt could potentially start autodiuresing following ATN)    #Atrial fibrillation s/p ablation and PPM  - CHADS-VASC of 4, on eliquis  - S/p AV node ablation and PPM insertion  - Eliquis held i/s/o SDH, can restart post bleed day 14 (see above)  - Telemetry  - Per NSGY can consider  restarting eliquis post bleed day 14 (7/18)    #CAD s/p CABG  - CABG done about 10 years ago  - No heart cath data in her chart  - Continue ASA 81mg     #Pulmonary HTN  - Has been thought to have group I, II, and III pulmonary hypertension at various times  - Most recent RHC (10/2022) significant for RAP 20 PAP 92/45 mPAP 61 PCWP 26 CO/CI (TD) 3.1/1.74 PVR 11.9 PaSat 57. Both pre and post-capillary hypertension  - RV dilation in TTE as above  - Patient prescribed 5-6LNC at rest, 10-15LNC on exertion but patient apparently only uses 6LNC and tolerates saturations in the 70s  - On remodulin infusion & sildenafil  Plan:  - C/w sildenafil 20mg BID (briefly held during admission)  - C/w remodulin dosing modified to remodulin 39ng/kg/min-no more titration-will refer for premixed cassettes   - C/w prednisone 10mg daily. This is a chronic medication for previously suspected organizing pneumonia    #Acute on chronic kidney injury, stage I, oliguric  - Baseline creatinine of around 1.4-1.5  - FENa 0.4% indicating prerenal DEBORA  - Etiology CRS vs ischemic ATN I/s/o hypotension  - Renal u/s: chronic kidney disease no acute findings  Plan:  - Strict I/Os, daily RFPs    # Right sided subdural hematoma  - SDH seen on CT scan following mechanical fall in the setting of Eliquis use for A-fib  - Neurosurgery consulted, no operative management; signed off  - S/p reversal with Andexxa  - Repeat CT Head w/o contrast showed no significant changes in the size of the subdural hematoma  - Had AMS during MICU stay, now resolved  - Restarted dvt ppx on 7/8  - Per NSGY: hold aspirin until post bleed day 7 (7/11), hold eliquis until post bleed day 14 (7/18)  Plan:  - Continue home ASA 81mg  - Per NSGY can consider restarting eliquis post bleed day 14 (7/18)  - CTH 2 wks post bleed (7/18), will be arranged by NSGY  - Continue Keppra 500mg BID for seizure prophylaxis, decide on stop date    # C/f UTI  - No symptoms of dysuria but symptoms of  reduced concentration and fatigue  - UA showing 11-20 WBC, 25 LE  - Blood cx 7/7: NGTD  - Urine cx 7/7; NGTD  - CTX 1g q24h (7/6 - 7/11)    #Left inferior pelvic ramus fracture  #Pelvic hematoma  - Occurred after fall  - No operative management per Ortho, no need for IR embolization  - Per Ortho, if she fails trial of ambulation she may be a candidate for operative intervention for pelvic pain relief  - Pain regimen: Tylenol 975mg TID, PRN Dilaudid 0.4mg q3h PRN, Lyrica 75mg BID, Robaxin 500mg BID PRN    #Major depression  #Generalized anxiety disorder  PLAN:  - Hold home buspar i/s/o DEBORA  - Continue home lexapro 10  - C/w lamotrigine 100mg daily  - C/w Xanax 0.5mg daily PRN for anxiety    Diet: 2-3g Na  DVT ppx: SQH  O2: 6L NC  Code status: DNAR/DNI  Surrogate medical decision maker: Jaleesa Rondon (friend) [phone]       Julian Hedrick MD     ======  I saw and evaluated the patient. I personally obtained the key and critical portions of the history and physical exam or was physically present for key and critical portions performed by the resident/fellow. I reviewed the resident/fellow's documentation and discussed the patient with the resident/fellow. I agree with the resident/fellow's medical decision making as documented in the note.    Asher Oliver MD

## 2024-07-14 NOTE — CARE PLAN
The patient's goals for the shift include      The clinical goals for the shift include Patient will have improved pain control this shift.    Over the shift, the patient reported  comfort after receiving iv pain medication at shift change. Patient remained on 6L NC throughout shift, no c/o SOB. Oxygen saturation remained above 88%. Plan is to continue pain control regime, work with PT/OT, and be discharged to a skilled nursing facility once medically stable.

## 2024-07-14 NOTE — DISCHARGE INSTRUCTIONS
You were admitted to the hospital for a fall in which it was determined you had a right-sided subdural hematoma and a left inferior pubic rami fracture. Neurosurgery was consulted and they stopped your eliquis to prevent further bleeding in your head. You were started on a new medication called Keppra to prevent seizures. The orthopedic team decided no operation was needed for your pelvic fracture. While admitted, you were transferred to the MICU for increase respiratory requirements and low blood pressures with concerns for cardiogenic shock. You were given medications to help increase your blood pressure and to remove excess fluid from your body before being transferred out of the ICU. We continued to give you medications to remove excess fluid until you were well enough to be discharged. You will be discharged home with home care.     Medication Changes:  -You were started on Keppra 500mg twice daily while hospitalized. Please continue to take until you discuss with the neurosurgery team on 7/23.  -Your eliquis was stopped while you were admitted. Please DO NOT restart your eliquis until you discuss it with the neurosurgery team on 7/23.  -Your sildenafil dose was changed from 20mg three times daily to 20mg twice daily   -Your torsemide dose was changed from 40mg twice daily to 20mg once daily.    Follow up:   -You will obtain a CT scan of your head on 7/22 to re-evaluate your right-sided subdural hematoma and follow up with neurosurgery (Mansi Ramirez) on 7/23 to discuss the results. You will also discuss if you need to keep taking the new medication Keppra and if you can restart your eliquis at this time.    No

## 2024-07-14 NOTE — SIGNIFICANT EVENT
Rapid Response RN Note     07/14/24 0008   Onset Documentation   Rapid Response Initiated By Radar auto page   Location/Room [facility]  (LT [id number])   Pager Time 0003   Arrival Time 0008   Event End Time 0011   Primary Reason for Call Radar auto page  (score 6)     RADAR score 6 due to the following VS: T 36.0 °Celsius; HR 90; RR 20; /62; SPO2 92% on supplemental oxygen.     Reviewed above VS with bedside RN via phone.  SPO2 verified: 96%.  Staff to page rapid response for any concerns or acute change in condition/VS.

## 2024-07-14 NOTE — CARE PLAN
Problem: Chronic Conditions and Co-morbidities  Goal: Patient's chronic conditions and co-morbidity symptoms are monitored and maintained or improved  Outcome: Progressing    Problem: Skin  Goal: Prevent/manage excess moisture  Outcome: Progressing     Patient was safe and free of injury this shift. Pain controlled with scheduled tylenol and PRN dilaudid. Pending discharge planning for SNF that would accept remodulin.

## 2024-07-15 ENCOUNTER — HOME HEALTH ADMISSION (OUTPATIENT)
Dept: HOME HEALTH SERVICES | Facility: HOME HEALTH | Age: 74
End: 2024-07-15
Payer: MEDICARE

## 2024-07-15 LAB
ALBUMIN SERPL BCP-MCNC: 3 G/DL (ref 3.4–5)
ANION GAP SERPL CALC-SCNC: 14 MMOL/L (ref 10–20)
BASOPHILS # BLD AUTO: 0.08 X10*3/UL (ref 0–0.1)
BASOPHILS NFR BLD AUTO: 0.6 %
BUN SERPL-MCNC: 22 MG/DL (ref 6–23)
CA-I BLD-SCNC: 1.1 MMOL/L (ref 1.1–1.33)
CALCIUM SERPL-MCNC: 7.8 MG/DL (ref 8.6–10.6)
CHLORIDE SERPL-SCNC: 101 MMOL/L (ref 98–107)
CO2 SERPL-SCNC: 27 MMOL/L (ref 21–32)
CREAT SERPL-MCNC: 1.1 MG/DL (ref 0.5–1.05)
EGFRCR SERPLBLD CKD-EPI 2021: 53 ML/MIN/1.73M*2
EOSINOPHIL # BLD AUTO: 0.25 X10*3/UL (ref 0–0.4)
EOSINOPHIL NFR BLD AUTO: 2 %
ERYTHROCYTE [DISTWIDTH] IN BLOOD BY AUTOMATED COUNT: 26.6 % (ref 11.5–14.5)
GLUCOSE SERPL-MCNC: 97 MG/DL (ref 74–99)
HCT VFR BLD AUTO: 32 % (ref 36–46)
HGB BLD-MCNC: 9.3 G/DL (ref 12–16)
HYPOCHROMIA BLD QL SMEAR: NORMAL
IMM GRANULOCYTES # BLD AUTO: 0.24 X10*3/UL (ref 0–0.5)
IMM GRANULOCYTES NFR BLD AUTO: 1.9 % (ref 0–0.9)
LYMPHOCYTES # BLD AUTO: 1.49 X10*3/UL (ref 0.8–3)
LYMPHOCYTES NFR BLD AUTO: 12.1 %
MAGNESIUM SERPL-MCNC: 1.49 MG/DL (ref 1.6–2.4)
MCH RBC QN AUTO: 24.5 PG (ref 26–34)
MCHC RBC AUTO-ENTMCNC: 29.1 G/DL (ref 32–36)
MCV RBC AUTO: 84 FL (ref 80–100)
MONOCYTES # BLD AUTO: 0.69 X10*3/UL (ref 0.05–0.8)
MONOCYTES NFR BLD AUTO: 5.6 %
NEUTROPHILS # BLD AUTO: 9.58 X10*3/UL (ref 1.6–5.5)
NEUTROPHILS NFR BLD AUTO: 77.8 %
NRBC BLD-RTO: 0.4 /100 WBCS (ref 0–0)
OVALOCYTES BLD QL SMEAR: NORMAL
PHOSPHATE SERPL-MCNC: 2.4 MG/DL (ref 2.5–4.9)
PLATELET # BLD AUTO: 274 X10*3/UL (ref 150–450)
POLYCHROMASIA BLD QL SMEAR: NORMAL
POTASSIUM SERPL-SCNC: 2.9 MMOL/L (ref 3.5–5.3)
RBC # BLD AUTO: 3.8 X10*6/UL (ref 4–5.2)
RBC MORPH BLD: NORMAL
SODIUM SERPL-SCNC: 139 MMOL/L (ref 136–145)
WBC # BLD AUTO: 12.3 X10*3/UL (ref 4.4–11.3)

## 2024-07-15 PROCEDURE — 82330 ASSAY OF CALCIUM: CPT

## 2024-07-15 PROCEDURE — 1200000002 HC GENERAL ROOM WITH TELEMETRY DAILY

## 2024-07-15 PROCEDURE — 2500000004 HC RX 250 GENERAL PHARMACY W/ HCPCS (ALT 636 FOR OP/ED)

## 2024-07-15 PROCEDURE — 99232 SBSQ HOSP IP/OBS MODERATE 35: CPT

## 2024-07-15 PROCEDURE — 83735 ASSAY OF MAGNESIUM: CPT

## 2024-07-15 PROCEDURE — 97530 THERAPEUTIC ACTIVITIES: CPT | Mod: GP

## 2024-07-15 PROCEDURE — 2500000001 HC RX 250 WO HCPCS SELF ADMINISTERED DRUGS (ALT 637 FOR MEDICARE OP)

## 2024-07-15 PROCEDURE — 2500000002 HC RX 250 W HCPCS SELF ADMINISTERED DRUGS (ALT 637 FOR MEDICARE OP, ALT 636 FOR OP/ED)

## 2024-07-15 PROCEDURE — 97530 THERAPEUTIC ACTIVITIES: CPT | Mod: GO

## 2024-07-15 PROCEDURE — 2500000005 HC RX 250 GENERAL PHARMACY W/O HCPCS

## 2024-07-15 PROCEDURE — 36415 COLL VENOUS BLD VENIPUNCTURE: CPT

## 2024-07-15 PROCEDURE — 85025 COMPLETE CBC W/AUTO DIFF WBC: CPT

## 2024-07-15 PROCEDURE — 80069 RENAL FUNCTION PANEL: CPT

## 2024-07-15 RX ORDER — TORSEMIDE 20 MG/1
20 TABLET ORAL DAILY
Start: 2024-07-16

## 2024-07-15 RX ORDER — SILDENAFIL CITRATE 20 MG/1
20 TABLET ORAL 2 TIMES DAILY
Start: 2024-07-15

## 2024-07-15 RX ORDER — LANOLIN ALCOHOL/MO/W.PET/CERES
400 CREAM (GRAM) TOPICAL DAILY
Status: DISCONTINUED | OUTPATIENT
Start: 2024-07-15 | End: 2024-07-15

## 2024-07-15 RX ORDER — MAGNESIUM SULFATE HEPTAHYDRATE 40 MG/ML
2 INJECTION, SOLUTION INTRAVENOUS ONCE
Status: DISCONTINUED | OUTPATIENT
Start: 2024-07-15 | End: 2024-07-15

## 2024-07-15 RX ORDER — POTASSIUM CHLORIDE 20 MEQ/1
20 TABLET, EXTENDED RELEASE ORAL 2 TIMES DAILY
Status: DISCONTINUED | OUTPATIENT
Start: 2024-07-15 | End: 2024-07-15

## 2024-07-15 RX ORDER — POTASSIUM CHLORIDE 1.5 G/1.58G
20 POWDER, FOR SOLUTION ORAL ONCE
Status: DISCONTINUED | OUTPATIENT
Start: 2024-07-15 | End: 2024-07-15

## 2024-07-15 RX ORDER — WATER 1 ML/ML
INJECTION, SOLUTION INTRAVENOUS
COMMUNITY

## 2024-07-15 RX ORDER — ASPIRIN 81 MG/1
81 TABLET ORAL DAILY
Start: 2024-07-16

## 2024-07-15 RX ORDER — TREPROSTINIL 2.5 MG/ML
39 INJECTION, SOLUTION INTRAVENOUS; SUBCUTANEOUS CONTINUOUS
Status: ON HOLD | COMMUNITY
End: 2024-07-15 | Stop reason: WASHOUT

## 2024-07-15 RX ORDER — POTASSIUM CHLORIDE 20 MEQ/1
20 TABLET, EXTENDED RELEASE ORAL DAILY
Status: DISCONTINUED | OUTPATIENT
Start: 2024-07-15 | End: 2024-07-15

## 2024-07-15 RX ORDER — POTASSIUM CHLORIDE 1.5 G/1.58G
40 POWDER, FOR SOLUTION ORAL 2 TIMES DAILY
Status: DISCONTINUED | OUTPATIENT
Start: 2024-07-15 | End: 2024-07-15

## 2024-07-15 RX ORDER — TREPROSTINIL 2.5 MG/ML
0.62 INJECTION, SOLUTION INTRAVENOUS; SUBCUTANEOUS CONTINUOUS
Status: ON HOLD | COMMUNITY
End: 2024-07-15 | Stop reason: WASHOUT

## 2024-07-15 RX ORDER — LEVETIRACETAM 500 MG/1
500 TABLET ORAL 2 TIMES DAILY
Start: 2024-07-15

## 2024-07-15 RX ORDER — POTASSIUM CHLORIDE 750 MG/1
10 TABLET, FILM COATED, EXTENDED RELEASE ORAL
Status: COMPLETED | OUTPATIENT
Start: 2024-07-15 | End: 2024-07-15

## 2024-07-15 RX ADMIN — ACETAMINOPHEN 975 MG: 325 TABLET ORAL at 09:38

## 2024-07-15 RX ADMIN — ONDANSETRON HYDROCHLORIDE 4 MG: 4 TABLET, FILM COATED ORAL at 20:52

## 2024-07-15 RX ADMIN — POTASSIUM CHLORIDE 10 MEQ: 750 TABLET, FILM COATED, EXTENDED RELEASE ORAL at 16:03

## 2024-07-15 RX ADMIN — ACETAMINOPHEN 975 MG: 325 TABLET ORAL at 16:01

## 2024-07-15 RX ADMIN — ACETAMINOPHEN 975 MG: 325 TABLET ORAL at 20:30

## 2024-07-15 RX ADMIN — TORSEMIDE 20 MG: 20 TABLET ORAL at 09:37

## 2024-07-15 RX ADMIN — LAMOTRIGINE 100 MG: 100 TABLET ORAL at 09:37

## 2024-07-15 RX ADMIN — POTASSIUM CHLORIDE 10 MEQ: 750 TABLET, FILM COATED, EXTENDED RELEASE ORAL at 16:05

## 2024-07-15 RX ADMIN — PANTOPRAZOLE SODIUM 40 MG: 40 TABLET, DELAYED RELEASE ORAL at 04:40

## 2024-07-15 RX ADMIN — ESCITALOPRAM 10 MG: 10 TABLET, FILM COATED ORAL at 09:38

## 2024-07-15 RX ADMIN — LEVETIRACETAM 500 MG: 500 TABLET, FILM COATED ORAL at 09:37

## 2024-07-15 RX ADMIN — LOPERAMIDE HYDROCHLORIDE 2 MG: 2 CAPSULE ORAL at 09:50

## 2024-07-15 RX ADMIN — PREDNISONE 10 MG: 20 TABLET ORAL at 09:37

## 2024-07-15 RX ADMIN — POTASSIUM PHOSPHATE, MONOBASIC POTASSIUM PHOSPHATE, DIBASIC 15 MMOL: 224; 236 INJECTION, SOLUTION, CONCENTRATE INTRAVENOUS at 16:52

## 2024-07-15 RX ADMIN — POTASSIUM CHLORIDE 10 MEQ: 750 TABLET, FILM COATED, EXTENDED RELEASE ORAL at 16:02

## 2024-07-15 RX ADMIN — HEPARIN SODIUM 5000 UNITS: 5000 INJECTION INTRAVENOUS; SUBCUTANEOUS at 05:00

## 2024-07-15 RX ADMIN — ASPIRIN 81 MG: 81 TABLET, COATED ORAL at 09:37

## 2024-07-15 RX ADMIN — SILDENAFIL 20 MG: 20 TABLET ORAL at 20:47

## 2024-07-15 RX ADMIN — SILDENAFIL 20 MG: 20 TABLET ORAL at 09:37

## 2024-07-15 RX ADMIN — HYDROMORPHONE HYDROCHLORIDE 0.4 MG: 1 INJECTION, SOLUTION INTRAMUSCULAR; INTRAVENOUS; SUBCUTANEOUS at 10:04

## 2024-07-15 RX ADMIN — CARBOXYMETHYLCELLULOSE SODIUM 1 DROP: 5 SOLUTION/ DROPS OPHTHALMIC at 09:38

## 2024-07-15 RX ADMIN — CARBOXYMETHYLCELLULOSE SODIUM 1 DROP: 5 SOLUTION/ DROPS OPHTHALMIC at 14:00

## 2024-07-15 RX ADMIN — LOPERAMIDE HYDROCHLORIDE 2 MG: 2 CAPSULE ORAL at 20:49

## 2024-07-15 RX ADMIN — AZELASTINE HYDROCHLORIDE 1 SPRAY: 137 SPRAY, METERED NASAL at 09:46

## 2024-07-15 RX ADMIN — HYDROMORPHONE HYDROCHLORIDE 0.4 MG: 1 INJECTION, SOLUTION INTRAMUSCULAR; INTRAVENOUS; SUBCUTANEOUS at 20:30

## 2024-07-15 RX ADMIN — LEVETIRACETAM 500 MG: 500 TABLET, FILM COATED ORAL at 20:30

## 2024-07-15 RX ADMIN — MAGNESIUM SULFATE HEPTAHYDRATE 2 G: 40 INJECTION, SOLUTION INTRAVENOUS at 14:31

## 2024-07-15 RX ADMIN — CARBOXYMETHYLCELLULOSE SODIUM 1 DROP: 5 SOLUTION/ DROPS OPHTHALMIC at 18:04

## 2024-07-15 RX ADMIN — CARBOXYMETHYLCELLULOSE SODIUM 1 DROP: 5 SOLUTION/ DROPS OPHTHALMIC at 20:30

## 2024-07-15 RX ADMIN — HEPARIN SODIUM 5000 UNITS: 5000 INJECTION INTRAVENOUS; SUBCUTANEOUS at 13:29

## 2024-07-15 RX ADMIN — CARBOXYMETHYLCELLULOSE SODIUM 1 DROP: 5 SOLUTION/ DROPS OPHTHALMIC at 04:35

## 2024-07-15 RX ADMIN — HYDROMORPHONE HYDROCHLORIDE 0.4 MG: 1 INJECTION, SOLUTION INTRAMUSCULAR; INTRAVENOUS; SUBCUTANEOUS at 13:29

## 2024-07-15 RX ADMIN — POTASSIUM CHLORIDE 10 MEQ: 750 TABLET, FILM COATED, EXTENDED RELEASE ORAL at 16:01

## 2024-07-15 RX ADMIN — PREGABALIN 75 MG: 25 CAPSULE ORAL at 09:37

## 2024-07-15 RX ADMIN — PREGABALIN 75 MG: 25 CAPSULE ORAL at 20:48

## 2024-07-15 RX ADMIN — POTASSIUM CHLORIDE 10 MEQ: 750 TABLET, FILM COATED, EXTENDED RELEASE ORAL at 16:06

## 2024-07-15 RX ADMIN — HYDROMORPHONE HYDROCHLORIDE 0.4 MG: 1 INJECTION, SOLUTION INTRAMUSCULAR; INTRAVENOUS; SUBCUTANEOUS at 04:34

## 2024-07-15 RX ADMIN — TREPROSTINIL 39 NG/KG/MIN: 5 INJECTION, SOLUTION INTRAVENOUS; SUBCUTANEOUS at 20:58

## 2024-07-15 RX ADMIN — HEPARIN SODIUM 5000 UNITS: 5000 INJECTION INTRAVENOUS; SUBCUTANEOUS at 20:30

## 2024-07-15 RX ADMIN — POTASSIUM CHLORIDE 10 MEQ: 750 TABLET, FILM COATED, EXTENDED RELEASE ORAL at 16:04

## 2024-07-15 ASSESSMENT — PAIN SCALES - GENERAL
PAINLEVEL_OUTOF10: 9
PAINLEVEL_OUTOF10: 8
PAINLEVEL_OUTOF10: 10 - WORST POSSIBLE PAIN
PAINLEVEL_OUTOF10: 9
PAINLEVEL_OUTOF10: 9
PAINLEVEL_OUTOF10: 6
PAINLEVEL_OUTOF10: 7
PAINLEVEL_OUTOF10: 6
PAINLEVEL_OUTOF10: 6

## 2024-07-15 ASSESSMENT — PAIN - FUNCTIONAL ASSESSMENT
PAIN_FUNCTIONAL_ASSESSMENT: 0-10
PAIN_FUNCTIONAL_ASSESSMENT: 0-10
PAIN_FUNCTIONAL_ASSESSMENT: UNABLE TO SELF-REPORT
PAIN_FUNCTIONAL_ASSESSMENT: 0-10

## 2024-07-15 ASSESSMENT — COGNITIVE AND FUNCTIONAL STATUS - GENERAL
DRESSING REGULAR LOWER BODY CLOTHING: A LOT
MOVING FROM LYING ON BACK TO SITTING ON SIDE OF FLAT BED WITH BEDRAILS: A LITTLE
MOBILITY SCORE: 9
TOILETING: A LOT
TOILETING: A LOT
HELP NEEDED FOR BATHING: A LOT
DAILY ACTIVITIY SCORE: 14
MOVING TO AND FROM BED TO CHAIR: A LOT
STANDING UP FROM CHAIR USING ARMS: TOTAL
DRESSING REGULAR LOWER BODY CLOTHING: TOTAL
DAILY ACTIVITIY SCORE: 14
HELP NEEDED FOR BATHING: A LOT
TOILETING: A LOT
STANDING UP FROM CHAIR USING ARMS: TOTAL
HELP NEEDED FOR BATHING: A LOT
CLIMB 3 TO 5 STEPS WITH RAILING: TOTAL
CLIMB 3 TO 5 STEPS WITH RAILING: TOTAL
TOILETING: A LOT
MOVING TO AND FROM BED TO CHAIR: TOTAL
MOBILITY SCORE: 9
DAILY ACTIVITIY SCORE: 14
PERSONAL GROOMING: A LOT
TURNING FROM BACK TO SIDE WHILE IN FLAT BAD: A LOT
WALKING IN HOSPITAL ROOM: TOTAL
DRESSING REGULAR LOWER BODY CLOTHING: A LOT
PERSONAL GROOMING: A LOT
DRESSING REGULAR LOWER BODY CLOTHING: A LOT
DRESSING REGULAR UPPER BODY CLOTHING: A LOT
MOVING FROM LYING ON BACK TO SITTING ON SIDE OF FLAT BED WITH BEDRAILS: A LOT
TURNING FROM BACK TO SIDE WHILE IN FLAT BAD: A LOT
MOBILITY SCORE: 9
MOVING TO AND FROM BED TO CHAIR: TOTAL
MOVING FROM LYING ON BACK TO SITTING ON SIDE OF FLAT BED WITH BEDRAILS: A LITTLE
PERSONAL GROOMING: A LITTLE
MOVING FROM LYING ON BACK TO SITTING ON SIDE OF FLAT BED WITH BEDRAILS: A LITTLE
TURNING FROM BACK TO SIDE WHILE IN FLAT BAD: A LOT
PERSONAL GROOMING: A LOT
DRESSING REGULAR UPPER BODY CLOTHING: A LOT
DRESSING REGULAR UPPER BODY CLOTHING: A LOT
CLIMB 3 TO 5 STEPS WITH RAILING: TOTAL
WALKING IN HOSPITAL ROOM: A LOT
STANDING UP FROM CHAIR USING ARMS: TOTAL
STANDING UP FROM CHAIR USING ARMS: A LOT
TURNING FROM BACK TO SIDE WHILE IN FLAT BAD: A LOT
MOVING TO AND FROM BED TO CHAIR: TOTAL
MOBILITY SCORE: 11
CLIMB 3 TO 5 STEPS WITH RAILING: TOTAL
DRESSING REGULAR UPPER BODY CLOTHING: A LITTLE
WALKING IN HOSPITAL ROOM: TOTAL
DAILY ACTIVITIY SCORE: 15
HELP NEEDED FOR BATHING: A LOT
WALKING IN HOSPITAL ROOM: TOTAL

## 2024-07-15 ASSESSMENT — PAIN DESCRIPTION - LOCATION
LOCATION: HIP
LOCATION: OTHER (COMMENT)
LOCATION: HIP
LOCATION: HIP

## 2024-07-15 ASSESSMENT — PAIN DESCRIPTION - DESCRIPTORS
DESCRIPTORS: SHARP
DESCRIPTORS: ACHING;SORE
DESCRIPTORS: SHARP

## 2024-07-15 ASSESSMENT — PAIN DESCRIPTION - ORIENTATION
ORIENTATION: LEFT

## 2024-07-15 ASSESSMENT — PAIN SCALES - PAIN ASSESSMENT IN ADVANCED DEMENTIA (PAINAD): TOTALSCORE: MEDICATION (SEE MAR)

## 2024-07-15 NOTE — SIGNIFICANT EVENT
RADAR Note     07/15/24 1614   Onset Documentation   Rapid Response Initiated By Radar auto page   Pager Time 1614   Arrival Time 1614   Event End Time 1620   Level II Called No   Primary Reason for Call Radar auto page     RADAR of 6 for VS entered (see flowsheet for details).  No acute changes noted.  Patient's Bedside RN aware of RADAR.  RN to page Rapid Response if further concern in patient condition arises.

## 2024-07-15 NOTE — PROGRESS NOTES
Occupational Therapy    OT Treatment    Patient Name: Bhargavi Isidro  MRN: [id number]  Today's Date: 7/15/2024  Time Calculation  Start Time: 1113  Stop Time: 1145  Time Calculation (min): 32 min        Assessment:  OT Assessment: Pt will benefit from continued skilled OT to increase indpendence in ADLs, functional mobility, activity tolerance, and safety.  Prognosis: Good  Barriers to Discharge: Decreased caregiver support, Inaccessible home environment  Evaluation/Treatment Tolerance: Patient limited by pain  Medical Staff Made Aware: Yes  End of Session Communication: Bedside nurse  End of Session Patient Position: Bed, 3 rail up, Alarm off, not on at start of session  OT Assessment Results: Decreased ADL status, Decreased upper extremity strength, Decreased endurance, Decreased functional mobility  Prognosis: Good  Barriers to Discharge: Decreased caregiver support, Inaccessible home environment  Evaluation/Treatment Tolerance: Patient limited by pain  Medical Staff Made Aware: Yes  Strengths: Attitude of self  Barriers to Participation: Comorbidities  Plan:  Treatment Interventions: ADL retraining, Functional transfer training, UE strengthening/ROM, Endurance training, Patient/family training, Neuromuscular reeducation, Compensatory technique education  OT Frequency: 4 times per week  OT Discharge Recommendations: Moderate intensity level of continued care  Equipment Recommended upon Discharge: Wheeled walker  OT Recommended Transfer Status: Assist of 2  OT - OK to Discharge: Yes  Treatment Interventions: ADL retraining, Functional transfer training, UE strengthening/ROM, Endurance training, Patient/family training, Neuromuscular reeducation, Compensatory technique education    Subjective   Previous Visit Info:  OT Last Visit  OT Received On: 07/15/24  General:  General  Reason for Referral: pt transferred to the MICU for management of acute on chronic hypoxic respiratory failure in setting of severe pHTN. repeat  CTH no significant change in SDH size, now on T5  Past Medical History Relevant to Rehab: HTN, HLD, CAD , STEMI s/p CABG, diastolic heart failure, congenital heart block s/p pacemeaker, PAH, Afib, CKD stage IV, chronc hypoxic resp failure, COPD (on home 5L NC), LEONEL, OA, chronic spine degen, GERD  Family/Caregiver Present: No  Prior to Session Communication: Bedside nurse  Patient Position Received: Bed, 3 rail up, Alarm off, not on at start of session  Preferred Learning Style: verbal, visual  General Comment: Pt supine in bed upon arrival, agreeable to OT  Precautions:  LE Weight Bearing Status: Weight Bearing as Tolerated (BLE)  Medical Precautions: Fall precautions, Oxygen therapy device and L/min  Precautions Comment: WBAT on LLE. SpO2 >88%  Vital Signs:  Vital Signs  SpO2:  (90-94 throughout session)  Patient Position: Standing  Pain:  Pain Assessment  Pain Assessment: 0-10  0-10 (Numeric) Pain Score: 6  Pain Type: Acute pain, Surgical pain  Pain Location: Hip  Pain Orientation: Left  Pain Interventions: Repositioned, Distraction    Objective    Cognition:  Cognition  Overall Cognitive Status: Within Functional Limits  Arousal/Alertness: Appropriate responses to stimuli  Orientation Level: Oriented X4  Following Commands: Follows one step commands without difficulty  Safety/Judgement: Within Functional Limits  Insight: Within function limits  Impulsive: Within functional limits    Activities of Daily Living:      UE Dressing  UE Dressing Level of Assistance: Moderate assistance  UE Dressing Where Assessed: Edge of bed  UE Dressing Comments: mod A to adjust and tie gown over back seated EOB    Bed Mobility/Transfers: Bed Mobility  Bed Mobility: Yes  Bed Mobility 1  Bed Mobility 1: Supine to sitting  Level of Assistance 1: Moderate assistance, Minimal verbal cues, Minimal tactile cues  Bed Mobility Comments 1: HOB elevated  Bed Mobility 2  Bed Mobility  2: Sitting to supine  Level of Assistance 2: Maximum  assistance (x2)    Transfers  Transfer: Yes  Transfer 1  Transfer From 1: Sit to, Stand to  Transfer to 1: Stand, Sit  Technique 1: Sit to stand, Stand to sit  Transfer Device 1: Walker  Transfer Level of Assistance 1: Maximum assistance, +2, Maximum tactile cues, Moderate verbal cues    Functional Mobility:  Functional Mobility  Functional Mobility Performed: No  Sitting Balance:  Static Sitting Balance  Static Sitting-Balance Support: Feet supported  Static Sitting-Level of Assistance: Close supervision, Contact guard  Dynamic Sitting Balance  Dynamic Sitting-Balance Support: Feet supported  Dynamic Sitting-Comments: CGA  Standing Balance:  Static Standing Balance  Static Standing-Balance Support: Bilateral upper extremity supported  Static Standing-Level of Assistance: Contact guard  Dynamic Standing Balance  Dynamic Standing-Comments: TBD  Modalities:  Modalities Used: No    Therapy/Activity:      Therapeutic Activity  Therapeutic Activity Performed: Yes  Therapeutic Activity 1: bed mob, STS, static standing ~1 min CGA FWW  Therapeutic Activity 2: dynamic sitting EOB ~20 min SBA-CGA, skilled monitoring of vitals throughout session    Outcome Measures:[facility] Daily Activity  Putting on and taking off regular lower body clothing: Total  Bathing (including washing, rinsing, drying): A lot  Putting on and taking off regular upper body clothing: A little  Toileting, which includes using toilet, bedpan or urinal: A lot  Taking care of personal grooming such as brushing teeth: A little  Eating Meals: None  Daily Activity - Total Score: 15      Education Documentation  Body Mechanics, taught by Alex Carver OT at 7/15/2024  2:05 PM.  Learner: Patient  Readiness: Acceptance  Method: Explanation  Response: Verbalizes Understanding    Precautions, taught by Alex Carver OT at 7/15/2024  2:05 PM.  Learner: Patient  Readiness: Acceptance  Method: Explanation  Response: Verbalizes Understanding    ADL Training, taught by  Alex Carver OT at 7/15/2024  2:05 PM.  Learner: Patient  Readiness: Acceptance  Method: Explanation  Response: Verbalizes Understanding    Education Comments  No comments found.      Goals:  Encounter Problems       Encounter Problems (Active)       ADLs       Patient will complete lower body dressing with MIN A for donning and doffing all LE clothes in order to increase Indep with task participation.  (Progressing)       Start:  07/05/24    Expected End:  07/19/24            Patient will complete toileting, including clothing management and hygiene, with MIN A in order to maximize functional Indep with task completion.  (Progressing)       Start:  07/05/24    Expected End:  07/19/24               BALANCE       Pt will increase static/dynamic stand to Good to increase safety and indep with functional task completion.   (Progressing)       Start:  07/05/24    Expected End:  07/19/24               EXERCISE/STRENGTHENING       Pt will increase overall BUE strength to 4+/5 in order to increase functional task participation.  (Progressing)       Start:  07/05/24    Expected End:  07/19/24            Pt will increase endurance to tolerate 15-20min of activity with no more than 1 rest break in order to increase ability to engage in ADL completion.  (Progressing)       Start:  07/05/24    Expected End:  07/19/24               MOBILITY       Pt will demo increased functional mobility to tolerate tasks necessary to complete ADL routine with SBA and LRD. (Progressing)       Start:  07/05/24    Expected End:  07/19/24               TRANSFERS       Patient will complete functional transfers using least restrictive device with SBA  in order to maximize functional potential and increase safety.  (Progressing)       Start:  07/05/24    Expected End:  07/19/24                 Alex Carver OTR/L

## 2024-07-15 NOTE — DISCHARGE SUMMARY
Discharge Diagnosis  SDH (subdural hematoma) (Multi)    Issues Requiring Follow-Up  -You will obtain a CT scan of your head on 7/22 to re-evaluate your right-sided subdural hematoma and follow up with neurosurgery (Mansi Ramirez) on 7/23 to discuss the results. You will also discuss if you need to keep taking the new medication Keppra and if you can restart your eliquis at your follow up appointment.    Test Results Pending At Discharge  Pending Labs       Order Current Status    Blood Culture Collected (07/07/24 3003)            Hospital Course  Ms. Bhargavi Isidro is a 74yoF with PMHx of Afib (s/p PPM), CAD s/p CABG, COPD, chronic hypoxic respiratory failure (5-6L at rest, 10-15L exertion) 2/2 group I/II/III pHTN (on remodulin, sildenafil) who originally presented to [facility] [place] on 7/4 after a presumed mechanical fall where she was found down at home. On admission found to have R SDH & L inferior pubic rami fracture c/b hematoma formation, s/p eliquis reversal with andexxa. No operative interventions following consultation by IR, Ortho, Neurosurgery. While in TICU pt was intermittently hypotension and receiving maintenance and bolus IVF, and subsequently had increasing O2 requirement to 15L NC at rest and therefore was transferred to MICU 7/5. Pt continued to be hypotensive in MICU & was started on levo (7/6 - 7/7) and dopamine (7/7 - 7/9) with c/f cardiogenic shock, sildenafil was held. Pt briefly on a lasix gtt 7/8 & then intermittently bolused until back on home 6L NC on 7/10. Pt's course c/b AMS (unclear etiology, repeat CTH w/ no change in SDH, but now mental status improved), DEBORA on CKD (pre-renal likely ischemic ATN I/s/o hypotension), and c/f UTI. Pt now transferred to the floor 7/10.     Rapid response called on patient on 7/12 for O2 65%. Patient was asymptomatic at the time and started on nonrebreather. O2 increased to 99% and patient was weaned to 8L NC. CXR and ABG findings insignificant. Continued to  elizabeth patient until stable for discharge. Sildenafil dose was changed from 20mg three times daily to 20mg twice daily and torsemide dose was changed from 40mg twice daily to 20mg once daily. Patient will be transferred to [facility].       Pertinent Physical Exam At Time of Discharge  Physical Exam  Constitutional:       Appearance: Normal appearance.   Cardiovascular:      Rate and Rhythm: Normal rate and regular rhythm.      Pulses: Normal pulses.      Heart sounds: Normal heart sounds.   Pulmonary:      Effort: Pulmonary effort is normal.      Breath sounds: Normal breath sounds.   Abdominal:      General: Abdomen is flat.      Palpations: Abdomen is soft.   Skin:     General: Skin is warm and dry.   Neurological:      General: No focal deficit present.      Mental Status: She is alert and oriented to person, place, and time.   Psychiatric:         Mood and Affect: Mood normal.         Behavior: Behavior normal.         Home Medications     Medication List      ASK your doctor about these medications     allopurinol 300 mg tablet; Commonly known as: Zyloprim   ALPRAZolam 0.5 mg tablet; Commonly known as: Xanax   azelastine 137 mcg (0.1 %) nasal spray; Commonly known as: Astelin   busPIRone 5 mg tablet; Commonly known as: Buspar   Eliquis 5 mg tablet; Generic drug: apixaban   escitalopram 10 mg tablet; Commonly known as: Lexapro   lamoTRIgine 100 mg tablet; Commonly known as: LaMICtal   loperamide 2 mg capsule; Commonly known as: Imodium   magnesium oxide 400 mg tablet; Commonly known as: Mag-Ox   methocarbamol 500 mg tablet; Commonly known as: Robaxin   pantoprazole 40 mg EC tablet; Commonly known as: ProtoNix   potassium chloride CR 10 mEq ER tablet; Commonly known as: Klor-Con   predniSONE 10 mg tablet; Commonly known as: Deltasone   pregabalin 75 mg capsule; Commonly known as: Lyrica   sildenafil 20 mg tablet; Commonly known as: Revatio   torsemide 20 mg tablet; Commonly known as: Demadex       Outpatient  Follow-Up  Future Appointments   Date Time Provider Department Center   7/22/2024  2:45 PM [facility] 1 [facility]   7/23/2024  2:00 PM Mansi Ramirez, APRN-CNP [id number]       Julian Hedrick MD

## 2024-07-15 NOTE — CARE PLAN
Problem: Pain - Adult  Goal: Verbalizes/displays adequate comfort level or baseline comfort level  Outcome: Progressing     Problem: Safety - Adult  Goal: Free from fall injury  Outcome: Progressing     Problem: Discharge Planning  Goal: Discharge to home or other facility with appropriate resources  Outcome: Progressing     Problem: Chronic Conditions and Co-morbidities  Goal: Patient's chronic conditions and co-morbidity symptoms are monitored and maintained or improved  Outcome: Progressing     Problem: Skin  Goal: Decreased wound size/increased tissue granulation at next dressing change  Outcome: Progressing  Flowsheets (Taken 7/15/2024 0605)  Decreased wound size/increased tissue granulation at next dressing change: Promote sleep for wound healing  Goal: Participates in plan/prevention/treatment measures  Outcome: Progressing  Flowsheets (Taken 7/15/2024 0605)  Participates in plan/prevention/treatment measures: Elevate heels  Goal: Prevent/manage excess moisture  Outcome: Progressing  Flowsheets (Taken 7/15/2024 0605)  Prevent/manage excess moisture: Moisturize dry skin  Goal: Prevent/minimize sheer/friction injuries  Outcome: Progressing  Flowsheets (Taken 7/15/2024 0605)  Prevent/minimize sheer/friction injuries: Use pull sheet  Goal: Promote/optimize nutrition  Outcome: Progressing  Flowsheets (Taken 7/15/2024 0605)  Promote/optimize nutrition: Monitor/record intake including meals  Goal: Promote skin healing  Outcome: Progressing  Flowsheets (Taken 7/15/2024 0605)  Promote skin healing: Protective dressings over bony prominences   The patient's goals for the shift include      The clinical goals for the shift include Patient will have improved pain control this shift.

## 2024-07-15 NOTE — PROGRESS NOTES
"Bhargavi Isidro is a 74 y.o. female on day 11 of admission presenting with SDH (subdural hematoma) (Multi).    Subjective   - Afebrile, NAEON  - Pt resting comfortably at baseline 6L   - Denies any SOB, chest pain, dizziness  -Patient endorsed some eye burning/tearing. Lubricating drops/compresses have been helpful.       Objective   Physical Exam  CONSTITUTIONAL: NAD, alert, and cooperative  SKIN: Ecchymoses on L face and all four extremities  RESPIRATORY/THORAX: Lungs CTA bilat, unlabored breathing, and good air movement  CARDIOVASCULAR: RRR, no murmurs, clicks, rubs, gallops appreciated    ABDOMEN: Abdomen is soft, non-distended, non-tender  EXTREMITIES: B/l LE edema (L >R)   NEUROLOGICAL: Awake, alert, oriented times three. Mentation lucid, speech clear, no focal deficits.  PSYCH: Speech fluent and not pressured. No tangential thought or hallucination, no psychomotor agitation or retardation. Good eye contact, appropriate affect. Insight normal    Last Recorded Vitals  Blood pressure 103/57, pulse 90, temperature 36.5 °C (97.7 °F), temperature source Temporal, resp. rate 18, height 1.651 m (5' 5\"), weight 64.7 kg (142 lb 10.2 oz), SpO2 93%.  Intake/Output last 3 Shifts:  I/O last 3 completed shifts:  In: 1149.4 (17.8 mL/kg) [P.O.:1080; I.V.:69.4 (1.1 mL/kg)]  Out: 3925 (60.7 mL/kg) [Urine:3925 (1.7 mL/kg/hr)]  Weight: 64.7 kg       Assessment/Plan   Ms. Bhargavi Isidro is a 74yoF with PMHx of Afib (s/p PPM), CAD s/p CABG, COPD, chronic hypoxic respiratory failure (5-6L at rest, 10-15L exertion) 2/2 group I/II/III pHTN (on remodulin, sildenafil) who originally presented to [facility] [place] on 7/4 after a presumed mechanical fall where she was found down at home. On admission found to have R SDH & L inferior pubic rami fracture c/b hematoma formation, s/p eliquis reversal with andexxa. No operative interventions following consultation by IR, Ortho, Neurosurgery. While in TICU pt was intermittently hypotension and receiving " maintenance and bolus IVF, and subsequently had increasing O2 requirement to 15L NC at rest and therefore was transferred to MICU 7/5. Pt continued to be hypotensive in MICU & was started on levo (7/6 - 7/7) and dopamine (7/7 - 7/9) with c/f cardiogenic shock, sildenafil was held. Pt briefly on a lasix gtt 7/8 & then intermittently bolused until back on home 6L NC on 7/10. Pt's course c/b AMS (unclear etiology, repeat CTH w/ no change in SDH, but now mental status improved), DEBORA on CKD (pre-renal likely ischemic ATN I/s/o hypotension), and c/f UTI. Pt now stable for transfer to the floor 7/10.     For follow up:   [ ] Home meds being held: buspar (for DEBORA), torsemide 40 BID, Kcl 40mEq TID, sildenafil changed TID to BID inpt, mg oxide, allopurinol  [ ] NSGY will discuss restarting eliquis at follow up appointment 7/23  [ ] CTH 2 wks post bleed, scheduled 7/22  [ ] Confirm new remodulin dose prior to discharge  [ ] Continue keppra until outpatient follow up with neurosurgery 7/23    #HFpEF  #Right sided heart failure  #Volume overload  #Cardiogenic shock? [RESOLVED]  - TTE (6/3/2024): LVEF of 55-60%, dilated RV with low normal RV systolic function and severe tricuspid regurgiation  - TTE (04Jul24): LVEF of 73%, with LV remodeling, Severely enlarged RV, Severely dilated RA, Severe Tricuspid regurgitation   - RV failure likely secondary to underlying pulmonary hypertension  - Patient has received continuous drips of LR and D10W and has received intermittent boluses since being admitted  - On levo (7/6 - 7/7) and dopamine (7/7 - 7/9) with c/f cardiogenic shock  - Briefly on a lasix gtt 7/8 & then intermittently bolused until back on home 6L NC on 7/10  Plan:  - Strict I/Os & daily weights  - Continue torsemide 20mg daily, (home dose: torsemide 40mg BID)  - Daily diuresis plan (pt could potentially start autodiuresing following ATN)    #Atrial fibrillation s/p ablation and PPM  - CHADS-VASC of 4, on eliquis  - S/p AV node  ablation and PPM insertion  - Telemetry  - NSGY will discuss restarting eliquis at follow up appointment 7/23    #CAD s/p CABG  - CABG done about 10 years ago  - No heart cath data in her chart  - Continue ASA 81mg     #Pulmonary HTN  - Has been thought to have group I, II, and III pulmonary hypertension at various times  - Most recent RHC (10/2022) significant for RAP 20 PAP 92/45 mPAP 61 PCWP 26 CO/CI (TD) 3.1/1.74 PVR 11.9 PaSat 57. Both pre and post-capillary hypertension  - RV dilation in TTE as above  - Patient prescribed 5-6LNC at rest, 10-15LNC on exertion but patient apparently only uses 6LNC and tolerates saturations in the 70s  - On remodulin infusion & sildenafil  Plan:  - C/w sildenafil 20mg BID (briefly held during admission)  - C/w remodulin dosing modified to remodulin 39ng/kg/min-no more titration-will refer for premixed cassettes   - C/w prednisone 10mg daily. This is a chronic medication for previously suspected organizing pneumonia    #Acute on chronic kidney injury, stage I, oliguric  - Baseline creatinine of around 1.4-1.5  - FENa 0.4% indicating prerenal DEBORA  - Etiology CRS vs ischemic ATN I/s/o hypotension  - Renal u/s: chronic kidney disease no acute findings  Plan:  - Strict I/Os, daily RFPs    # Right sided subdural hematoma  - SDH seen on CT scan following mechanical fall in the setting of Eliquis use for A-fib  - Neurosurgery consulted, no operative management; signed off  - S/p reversal with Andexxa  - Repeat CT Head w/o contrast showed no significant changes in the size of the subdural hematoma  - Had AMS during MICU stay, now resolved  - Restarted dvt ppx on 7/8  - Per NSGY: hold aspirin until post bleed day 7 (7/11), hold eliquis until outpatient follow up 7/23  - Continue home ASA 81mg  - CTH 2 wks post bleed, scheduled 7/22  - Continue Keppra 500mg BID for seizure prophylaxis until outpatient follow up on 7/23    # C/f UTI  - No symptoms of dysuria but symptoms of reduced  concentration and fatigue  - UA showing 11-20 WBC, 25 LE  - Blood cx 7/7: NGTD  - Urine cx 7/7; NGTD  - CTX 1g q24h (7/6 - 7/11)    #Left inferior pelvic ramus fracture  #Pelvic hematoma  - Occurred after fall  - No operative management per Ortho, no need for IR embolization  - Per Ortho, if she fails trial of ambulation she may be a candidate for operative intervention for pelvic pain relief  - Pain regimen: Tylenol 975mg TID, PRN Dilaudid 0.4mg q3h PRN, Lyrica 75mg BID, Robaxin 500mg BID PRN    #Major depression  #Generalized anxiety disorder  PLAN:  - Hold home buspar i/s/o DEBORA  - Continue home lexapro 10  - C/w lamotrigine 100mg daily  - C/w Xanax 0.5mg daily PRN for anxiety    Diet: 2-3g Na  DVT ppx: SQH  O2: 6L NC  Code status: DNAR/DNI  Surrogate medical decision maker: Jaleesa Rondon (friend) [phone]       Julian Hedrick MD

## 2024-07-15 NOTE — PROGRESS NOTES
Physical Therapy    Physical Therapy Treatment    Patient Name: Bhargavi Isidro  MRN: [id number]  Today's Date: 7/15/2024  Time Calculation  Start Time: 1447  Stop Time: 1512  Time Calculation (min): 25 min    Assessment/Plan      PT Plan  Inpatient/Swing Bed or Outpatient: Inpatient  PT Plan  Treatment/Interventions: Bed mobility, Transfer training, Gait training, Balance training, Strengthening, Endurance training, Therapeutic exercise, Therapeutic activity  PT Plan: Ongoing PT  PT Frequency: 6 times per week  PT Discharge Recommendations: Moderate intensity level of continued care  PT Recommended Transfer Status: Assist x1 (mod assist)  PT - OK to Discharge: Yes      General Visit Information:   PT  Visit  PT Received On: 07/15/24  General  Family/Caregiver Present: Yes  Caregiver Feedback: RN present at start of visit tending to IV medications.  Prior to Session Communication: Bedside nurse  Patient Position Received: Bed, 3 rail up, Alarm off, not on at start of session  Preferred Learning Style: verbal, auditory  General Comment: Pt resting in bed upon entry. Reports feeling fatigued though willing to work with PT.    Subjective   Precautions:  Precautions  LE Weight Bearing Status: Weight Bearing as Tolerated  Medical Precautions: Fall precautions, Oxygen therapy device and L/min  Precautions Comment: WBAT    Objective   Pain:  Pain Assessment  Pain Assessment: 0-10  0-10 (Numeric) Pain Score: 7  Pain Type: Acute pain  Pain Location: Hip  Pain Orientation: Left  Cognition:  Cognition  Overall Cognitive Status: Within Functional Limits  Arousal/Alertness: Appropriate responses to stimuli  Orientation Level: Oriented X4  Following Commands: Follows one step commands without difficulty  Safety/Judgement: Within Functional Limits  Insight: Within function limits  Impulsive: Within functional limits  Coordination:  Movements are Fluid and Coordinated: Yes  Coordination Comment: Limited reaction by pain.  Postural  Control:  Postural Control  Postural Control: Impaired  Posture Comment: Leans to L with stance.  Static Sitting Balance  Static Sitting-Balance Support: Bilateral upper extremity supported  Static Sitting-Level of Assistance: Close supervision  Static Sitting-Comment/Number of Minutes: Approximately 15 minutes total.  Static Standing Balance  Static Standing-Balance Support: Bilateral upper extremity supported (on a wheeled walker)  Static Standing-Level of Assistance: Moderate assistance  Static Standing-Comment/Number of Minutes: Approximately 90 seconds    Activity Tolerance:  Activity Tolerance  Endurance: Tolerates 10 - 20 min exercise with multiple rests  Activity Tolerance Comments: Limited by discomfort and rapid onset of fatigue  Treatments:  Therapeutic Exercise  Therapeutic Exercise Performed: Yes  Therapeutic Exercise Activity 1: Educated patient on self assisted HS with use of a bedsheet. Pt verbalized and demonstrated understanding. Performed x 10.    Therapeutic Activity  Therapeutic Activity Performed: Yes    Bed Mobility  Bed Mobility: Yes  Bed Mobility 1  Bed Mobility 1: Supine to sitting, Sitting to supine  Level of Assistance 1: Moderate assistance  Bed Mobility 2  Bed Mobility  2: Supine to sitting, Sitting to supine  Level of Assistance 2: Moderate assistance    Ambulation/Gait Training  Ambulation/Gait Training Performed: No  Ambulation/Gait Training 1  Comments/Distance (ft) 1: Attempted sidestepping though patient  Transfer 1  Transfer From 1: Sit to, Stand to  Transfer to 1: Stand, Sit  Transfer Device 1: Walker  Transfer Level of Assistance 1: Maximum assistance    Outcome Measures:  [facility] Basic Mobility  Turning from your back to your side while in a flat bed without using bedrails: A lot  Moving from lying on your back to sitting on the side of a flat bed without using bedrails: A lot  Moving to and from bed to chair (including a wheelchair): A lot  Standing up from a chair using your  arms (e.g. wheelchair or bedside chair): A lot  To walk in hospital room: A lot  Climbing 3-5 steps with railing: Total  Basic Mobility - Total Score: 11    Education Documentation  Precautions, taught by Lincoln Quesada, PT at 7/15/2024  3:43 PM.  Learner: Patient  Readiness: Acceptance  Method: Explanation  Response: Verbalizes Understanding    Mobility Training, taught by Lincoln Quesada, PT at 7/15/2024  3:43 PM.  Learner: Patient  Readiness: Acceptance  Method: Explanation  Response: Verbalizes Understanding    Education Comments  No comments found.      Encounter Problems       Encounter Problems (Active)       Balance       Pt. will score >18 on Tinetti for lower risk of falls (Progressing)       Start:  07/05/24    Expected End:  07/19/24               Mobility       Patient will ambulate >25ft. with LRAD CGA.  (Progressing)       Start:  07/05/24    Expected End:  07/19/24            Patient will ascend and descend 2 stairs with railing with LRAD MinAx1. (Progressing)       Start:  07/05/24    Expected End:  07/19/24               PT Transfers       Patient will perform bed mobility Indep. (Progressing)       Start:  07/05/24    Expected End:  07/19/24            Patient will transfer sit to and from stand with LRAD CGA.  (Progressing)       Start:  07/05/24    Expected End:  07/19/24               Pain - Adult

## 2024-07-16 ENCOUNTER — DOCUMENTATION (OUTPATIENT)
Dept: HOME HEALTH SERVICES | Facility: HOME HEALTH | Age: 74
End: 2024-07-16
Payer: MEDICARE

## 2024-07-16 LAB
ALBUMIN SERPL BCP-MCNC: 3.1 G/DL (ref 3.4–5)
ANION GAP SERPL CALC-SCNC: 13 MMOL/L (ref 10–20)
BASOPHILS # BLD MANUAL: 0.1 X10*3/UL (ref 0–0.1)
BASOPHILS NFR BLD MANUAL: 0.9 %
BUN SERPL-MCNC: 23 MG/DL (ref 6–23)
BURR CELLS BLD QL SMEAR: ABNORMAL
CALCIUM SERPL-MCNC: 8.2 MG/DL (ref 8.6–10.6)
CHLORIDE SERPL-SCNC: 103 MMOL/L (ref 98–107)
CO2 SERPL-SCNC: 28 MMOL/L (ref 21–32)
CREAT SERPL-MCNC: 1.16 MG/DL (ref 0.5–1.05)
EGFRCR SERPLBLD CKD-EPI 2021: 50 ML/MIN/1.73M*2
EOSINOPHIL # BLD MANUAL: 0.1 X10*3/UL (ref 0–0.4)
EOSINOPHIL NFR BLD MANUAL: 0.9 %
ERYTHROCYTE [DISTWIDTH] IN BLOOD BY AUTOMATED COUNT: 26.6 % (ref 11.5–14.5)
GLUCOSE SERPL-MCNC: 95 MG/DL (ref 74–99)
HCT VFR BLD AUTO: 32 % (ref 36–46)
HGB BLD-MCNC: 9.3 G/DL (ref 12–16)
IMM GRANULOCYTES # BLD AUTO: 0.18 X10*3/UL (ref 0–0.5)
IMM GRANULOCYTES NFR BLD AUTO: 1.6 % (ref 0–0.9)
LYMPHOCYTES # BLD MANUAL: 1.2 X10*3/UL (ref 0.8–3)
LYMPHOCYTES NFR BLD MANUAL: 10.4 %
MAGNESIUM SERPL-MCNC: 2.05 MG/DL (ref 1.6–2.4)
MCH RBC QN AUTO: 24.5 PG (ref 26–34)
MCHC RBC AUTO-ENTMCNC: 29.1 G/DL (ref 32–36)
MCV RBC AUTO: 84 FL (ref 80–100)
MONOCYTES # BLD MANUAL: 0.2 X10*3/UL (ref 0.05–0.8)
MONOCYTES NFR BLD MANUAL: 1.7 %
NEUTS SEG # BLD MANUAL: 9.9 X10*3/UL (ref 1.6–5)
NEUTS SEG NFR BLD MANUAL: 86.1 %
NRBC BLD-RTO: 0.2 /100 WBCS (ref 0–0)
OVALOCYTES BLD QL SMEAR: ABNORMAL
PHOSPHATE SERPL-MCNC: 3.2 MG/DL (ref 2.5–4.9)
PLATELET # BLD AUTO: 257 X10*3/UL (ref 150–450)
POLYCHROMASIA BLD QL SMEAR: ABNORMAL
POTASSIUM SERPL-SCNC: 5.4 MMOL/L (ref 3.5–5.3)
RBC # BLD AUTO: 3.8 X10*6/UL (ref 4–5.2)
RBC MORPH BLD: ABNORMAL
SODIUM SERPL-SCNC: 139 MMOL/L (ref 136–145)
TOTAL CELLS COUNTED BLD: 115
WBC # BLD AUTO: 11.5 X10*3/UL (ref 4.4–11.3)

## 2024-07-16 PROCEDURE — 97530 THERAPEUTIC ACTIVITIES: CPT | Mod: GO

## 2024-07-16 PROCEDURE — 83735 ASSAY OF MAGNESIUM: CPT

## 2024-07-16 PROCEDURE — 2500000001 HC RX 250 WO HCPCS SELF ADMINISTERED DRUGS (ALT 637 FOR MEDICARE OP)

## 2024-07-16 PROCEDURE — 36415 COLL VENOUS BLD VENIPUNCTURE: CPT

## 2024-07-16 PROCEDURE — 85027 COMPLETE CBC AUTOMATED: CPT

## 2024-07-16 PROCEDURE — 80069 RENAL FUNCTION PANEL: CPT

## 2024-07-16 PROCEDURE — 2500000002 HC RX 250 W HCPCS SELF ADMINISTERED DRUGS (ALT 637 FOR MEDICARE OP, ALT 636 FOR OP/ED)

## 2024-07-16 PROCEDURE — 2500000005 HC RX 250 GENERAL PHARMACY W/O HCPCS

## 2024-07-16 PROCEDURE — 97110 THERAPEUTIC EXERCISES: CPT | Mod: GO

## 2024-07-16 PROCEDURE — 2500000004 HC RX 250 GENERAL PHARMACY W/ HCPCS (ALT 636 FOR OP/ED)

## 2024-07-16 PROCEDURE — 99232 SBSQ HOSP IP/OBS MODERATE 35: CPT

## 2024-07-16 PROCEDURE — 85007 BL SMEAR W/DIFF WBC COUNT: CPT

## 2024-07-16 PROCEDURE — 1100000001 HC PRIVATE ROOM DAILY

## 2024-07-16 RX ORDER — ALLOPURINOL 100 MG/1
100 TABLET ORAL DAILY
Status: DISCONTINUED | OUTPATIENT
Start: 2024-07-16 | End: 2024-07-17 | Stop reason: HOSPADM

## 2024-07-16 RX ADMIN — LOPERAMIDE HYDROCHLORIDE 2 MG: 2 CAPSULE ORAL at 21:21

## 2024-07-16 RX ADMIN — SILDENAFIL 20 MG: 20 TABLET ORAL at 09:23

## 2024-07-16 RX ADMIN — PREDNISONE 10 MG: 20 TABLET ORAL at 09:23

## 2024-07-16 RX ADMIN — PREGABALIN 75 MG: 25 CAPSULE ORAL at 09:23

## 2024-07-16 RX ADMIN — LOPERAMIDE HYDROCHLORIDE 2 MG: 2 CAPSULE ORAL at 12:39

## 2024-07-16 RX ADMIN — SILDENAFIL 20 MG: 20 TABLET ORAL at 21:21

## 2024-07-16 RX ADMIN — LAMOTRIGINE 100 MG: 100 TABLET ORAL at 09:23

## 2024-07-16 RX ADMIN — LOPERAMIDE HYDROCHLORIDE 2 MG: 2 CAPSULE ORAL at 09:23

## 2024-07-16 RX ADMIN — CARBOXYMETHYLCELLULOSE SODIUM 1 DROP: 5 SOLUTION/ DROPS OPHTHALMIC at 16:37

## 2024-07-16 RX ADMIN — ALLOPURINOL 100 MG: 100 TABLET ORAL at 12:26

## 2024-07-16 RX ADMIN — HEPARIN SODIUM 5000 UNITS: 5000 INJECTION INTRAVENOUS; SUBCUTANEOUS at 21:21

## 2024-07-16 RX ADMIN — HYDROMORPHONE HYDROCHLORIDE 0.4 MG: 1 INJECTION, SOLUTION INTRAMUSCULAR; INTRAVENOUS; SUBCUTANEOUS at 09:24

## 2024-07-16 RX ADMIN — HEPARIN SODIUM 5000 UNITS: 5000 INJECTION INTRAVENOUS; SUBCUTANEOUS at 12:26

## 2024-07-16 RX ADMIN — CARBOXYMETHYLCELLULOSE SODIUM 1 DROP: 5 SOLUTION/ DROPS OPHTHALMIC at 09:23

## 2024-07-16 RX ADMIN — HEPARIN SODIUM 5000 UNITS: 5000 INJECTION INTRAVENOUS; SUBCUTANEOUS at 04:40

## 2024-07-16 RX ADMIN — CARBOXYMETHYLCELLULOSE SODIUM 1 DROP: 5 SOLUTION/ DROPS OPHTHALMIC at 15:06

## 2024-07-16 RX ADMIN — TREPROSTINIL 39 NG/KG/MIN: 5 INJECTION, SOLUTION INTRAVENOUS; SUBCUTANEOUS at 21:24

## 2024-07-16 RX ADMIN — ASPIRIN 81 MG: 81 TABLET, COATED ORAL at 09:24

## 2024-07-16 RX ADMIN — CARBOXYMETHYLCELLULOSE SODIUM 1 DROP: 5 SOLUTION/ DROPS OPHTHALMIC at 21:22

## 2024-07-16 RX ADMIN — HYDROMORPHONE HYDROCHLORIDE 0.4 MG: 1 INJECTION, SOLUTION INTRAMUSCULAR; INTRAVENOUS; SUBCUTANEOUS at 16:38

## 2024-07-16 RX ADMIN — HYDROMORPHONE HYDROCHLORIDE 0.4 MG: 1 INJECTION, SOLUTION INTRAMUSCULAR; INTRAVENOUS; SUBCUTANEOUS at 04:40

## 2024-07-16 RX ADMIN — PREGABALIN 75 MG: 25 CAPSULE ORAL at 21:21

## 2024-07-16 RX ADMIN — HYDROMORPHONE HYDROCHLORIDE 0.4 MG: 1 INJECTION, SOLUTION INTRAMUSCULAR; INTRAVENOUS; SUBCUTANEOUS at 21:21

## 2024-07-16 RX ADMIN — ACETAMINOPHEN 975 MG: 325 TABLET ORAL at 09:23

## 2024-07-16 RX ADMIN — LEVETIRACETAM 500 MG: 500 TABLET, FILM COATED ORAL at 21:21

## 2024-07-16 RX ADMIN — ACETAMINOPHEN 975 MG: 325 TABLET ORAL at 15:06

## 2024-07-16 RX ADMIN — PANTOPRAZOLE SODIUM 40 MG: 40 TABLET, DELAYED RELEASE ORAL at 04:40

## 2024-07-16 RX ADMIN — ESCITALOPRAM 10 MG: 10 TABLET, FILM COATED ORAL at 09:23

## 2024-07-16 RX ADMIN — CARBOXYMETHYLCELLULOSE SODIUM 1 DROP: 5 SOLUTION/ DROPS OPHTHALMIC at 04:44

## 2024-07-16 RX ADMIN — LEVETIRACETAM 500 MG: 500 TABLET, FILM COATED ORAL at 09:23

## 2024-07-16 RX ADMIN — TORSEMIDE 20 MG: 20 TABLET ORAL at 09:23

## 2024-07-16 RX ADMIN — HYDROMORPHONE HYDROCHLORIDE 0.4 MG: 1 INJECTION, SOLUTION INTRAMUSCULAR; INTRAVENOUS; SUBCUTANEOUS at 12:38

## 2024-07-16 RX ADMIN — ACETAMINOPHEN 975 MG: 325 TABLET ORAL at 21:22

## 2024-07-16 ASSESSMENT — COGNITIVE AND FUNCTIONAL STATUS - GENERAL
MOVING FROM LYING ON BACK TO SITTING ON SIDE OF FLAT BED WITH BEDRAILS: A LITTLE
DRESSING REGULAR UPPER BODY CLOTHING: A LOT
DAILY ACTIVITIY SCORE: 14
CLIMB 3 TO 5 STEPS WITH RAILING: TOTAL
TOILETING: A LOT
DAILY ACTIVITIY SCORE: 15
HELP NEEDED FOR BATHING: A LOT
DRESSING REGULAR LOWER BODY CLOTHING: TOTAL
PERSONAL GROOMING: A LOT
MOVING TO AND FROM BED TO CHAIR: TOTAL
MOBILITY SCORE: 9
MOVING TO AND FROM BED TO CHAIR: TOTAL
HELP NEEDED FOR BATHING: A LOT
MOBILITY SCORE: 9
HELP NEEDED FOR BATHING: A LOT
DRESSING REGULAR LOWER BODY CLOTHING: A LOT
DRESSING REGULAR UPPER BODY CLOTHING: A LOT
WALKING IN HOSPITAL ROOM: TOTAL
DRESSING REGULAR LOWER BODY CLOTHING: A LOT
TOILETING: A LOT
WALKING IN HOSPITAL ROOM: TOTAL
MOVING FROM LYING ON BACK TO SITTING ON SIDE OF FLAT BED WITH BEDRAILS: A LITTLE
MOVING FROM LYING ON BACK TO SITTING ON SIDE OF FLAT BED WITH BEDRAILS: A LITTLE
PERSONAL GROOMING: A LOT
DRESSING REGULAR UPPER BODY CLOTHING: A LITTLE
DRESSING REGULAR LOWER BODY CLOTHING: A LOT
PERSONAL GROOMING: A LOT
CLIMB 3 TO 5 STEPS WITH RAILING: TOTAL
DAILY ACTIVITIY SCORE: 14
TURNING FROM BACK TO SIDE WHILE IN FLAT BAD: A LOT
TOILETING: A LOT
PERSONAL GROOMING: A LITTLE
CLIMB 3 TO 5 STEPS WITH RAILING: TOTAL
TOILETING: A LOT
TURNING FROM BACK TO SIDE WHILE IN FLAT BAD: A LOT
STANDING UP FROM CHAIR USING ARMS: TOTAL
MOBILITY SCORE: 9
STANDING UP FROM CHAIR USING ARMS: TOTAL
MOVING TO AND FROM BED TO CHAIR: TOTAL
TURNING FROM BACK TO SIDE WHILE IN FLAT BAD: A LOT
DAILY ACTIVITIY SCORE: 14
DRESSING REGULAR UPPER BODY CLOTHING: A LOT
HELP NEEDED FOR BATHING: A LOT
WALKING IN HOSPITAL ROOM: TOTAL
STANDING UP FROM CHAIR USING ARMS: TOTAL

## 2024-07-16 ASSESSMENT — PAIN DESCRIPTION - LOCATION
LOCATION: LEG
LOCATION: LEG
LOCATION: HIP

## 2024-07-16 ASSESSMENT — PAIN SCALES - GENERAL
PAINLEVEL_OUTOF10: 9
PAINLEVEL_OUTOF10: 6
PAINLEVEL_OUTOF10: 9
PAINLEVEL_OUTOF10: 8
PAINLEVEL_OUTOF10: 9
PAINLEVEL_OUTOF10: 6

## 2024-07-16 ASSESSMENT — PAIN DESCRIPTION - ORIENTATION
ORIENTATION: LEFT

## 2024-07-16 ASSESSMENT — PAIN DESCRIPTION - DESCRIPTORS
DESCRIPTORS: SHARP

## 2024-07-16 ASSESSMENT — PAIN - FUNCTIONAL ASSESSMENT
PAIN_FUNCTIONAL_ASSESSMENT: 0-10

## 2024-07-16 NOTE — CARE PLAN
The patient's goals for the shift include Patient will have improved pain control this shift    The clinical goals for the shift include Patient will have improved pain control this shift.    Over the shift, the patient did better with pain control, she worked with PT today and tolerated well for her capabilities, cardiac chair placed in room for PT to work with patient.  Patient continues on Remodulin, potassium and magnesium wnl.  Patient remained safe free from falls and injury this shift.      Problem: Pain - Adult  Goal: Verbalizes/displays adequate comfort level or baseline comfort level  Outcome: Progressing     Problem: Safety - Adult  Goal: Free from fall injury  Outcome: Progressing     Problem: Discharge Planning  Goal: Discharge to home or other facility with appropriate resources  Outcome: Progressing     Problem: Chronic Conditions and Co-morbidities  Goal: Patient's chronic conditions and co-morbidity symptoms are monitored and maintained or improved  Outcome: Progressing     Problem: Skin  Goal: Decreased wound size/increased tissue granulation at next dressing change  Outcome: Progressing  Flowsheets (Taken 7/16/2024 1746)  Decreased wound size/increased tissue granulation at next dressing change:   Promote sleep for wound healing   Protective dressings over bony prominences  Goal: Participates in plan/prevention/treatment measures  Outcome: Progressing  Flowsheets (Taken 7/16/2024 1746)  Participates in plan/prevention/treatment measures:   Increase activity/out of bed for meals   Discuss with provider PT/OT consult   Elevate heels  Goal: Prevent/manage excess moisture  Outcome: Progressing  Flowsheets (Taken 7/16/2024 1746)  Prevent/manage excess moisture:   Cleanse incontinence/protect with barrier cream   Moisturize dry skin   Monitor for/manage infection if present   Follow provider orders for dressing changes  Goal: Prevent/minimize sheer/friction injuries  Outcome: Progressing  Flowsheets  (Taken 7/16/2024 8846)  Prevent/minimize sheer/friction injuries:   Increase activity/out of bed for meals   HOB 30 degrees or less   Turn/reposition every 2 hours/use positioning/transfer devices  Goal: Promote/optimize nutrition  Outcome: Progressing  Flowsheets (Taken 7/16/2024 1678)  Promote/optimize nutrition:   Consume > 50% meals/supplements   Offer water/supplements/favorite foods   Monitor/record intake including meals  Goal: Promote skin healing  Outcome: Progressing  Flowsheets (Taken 7/15/2024 1640)  Promote skin healing:   Assess skin/pad under line(s)/device(s)   Ensure correct size (line/device) and apply per  instructions   Protective dressings over bony prominences   Rotate device position/do not position patient on device   Turn/reposition every 2 hours/use positioning/transfer devices     Problem: Pain  Goal: Takes deep breaths with improved pain control throughout the shift  Outcome: Progressing  Goal: Turns in bed with improved pain control throughout the shift  Outcome: Progressing  Goal: Walks with improved pain control throughout the shift  Outcome: Progressing  Goal: Performs ADL's with improved pain control throughout shift  Outcome: Progressing  Goal: Participates in PT with improved pain control throughout the shift  Outcome: Progressing  Goal: Free from opioid side effects throughout the shift  Outcome: Progressing  Goal: Free from acute confusion related to pain meds throughout the shift  Outcome: Progressing     Problem: Fall/Injury  Goal: Not fall by end of shift  Outcome: Progressing  Goal: Be free from injury by end of the shift  Outcome: Progressing  Goal: Verbalize understanding of personal risk factors for fall in the hospital  Outcome: Progressing  Goal: Verbalize understanding of risk factor reduction measures to prevent injury from fall in the home  Outcome: Progressing  Goal: Use assistive devices by end of the shift  Outcome: Progressing  Goal: Pace activities to  prevent fatigue by end of the shift  Outcome: Progressing

## 2024-07-16 NOTE — HH CARE COORDINATION
This referral has been made a Non Admit with  Home Care due to Pending Transfer to [facility]. Per Secure Chat with Dr. Adwoa Garcia, patient has been Accepted by [facility] and is only awaiting a Bed. . If you have further questions, feel free to reach out to our office at [phone]. Thank you, [facility] Intake.

## 2024-07-16 NOTE — CARE PLAN
Problem: Pain - Adult  Goal: Verbalizes/displays adequate comfort level or baseline comfort level  Outcome: Progressing     Problem: Safety - Adult  Goal: Free from fall injury  Outcome: Progressing     Problem: Discharge Planning  Goal: Discharge to home or other facility with appropriate resources  Outcome: Progressing   The patient's goals for the shift include Patient will have improved pain control this shift    The clinical goals for the shift include Patient will have improved pain control this shift.

## 2024-07-16 NOTE — PROGRESS NOTES
Occupational Therapy    OT Treatment    Patient Name: Bhargavi Isidro  MRN: [id number]  Today's Date: 7/16/2024  Time Calculation  Start Time: 1436  Stop Time: 1532  Time Calculation (min): 56 min        Assessment:  OT Assessment: Pt will benefit from continued skilled OT to increase independence in ADLs, functional mobility, activity tolerance, safety, and strength  Prognosis: Good  Barriers to Discharge: Decreased caregiver support, Inaccessible home environment  Evaluation/Treatment Tolerance: Patient limited by pain  Medical Staff Made Aware: Yes  End of Session Communication: Bedside nurse  End of Session Patient Position: Bed, 3 rail up, Alarm on  OT Assessment Results: Decreased ADL status, Decreased upper extremity strength, Decreased endurance, Decreased functional mobility  Prognosis: Good  Barriers to Discharge: Decreased caregiver support, Inaccessible home environment  Evaluation/Treatment Tolerance: Patient limited by pain  Medical Staff Made Aware: Yes  Strengths: Attitude of self  Barriers to Participation: Comorbidities  Plan:  Treatment Interventions: ADL retraining, Functional transfer training, UE strengthening/ROM, Endurance training, Patient/family training, Equipment evaluation/education, Compensatory technique education  OT Frequency: 4 times per week  OT Discharge Recommendations: Moderate intensity level of continued care  Equipment Recommended upon Discharge: Wheeled walker  OT Recommended Transfer Status: Assist of 2  OT - OK to Discharge: Yes (upon medical clearance)  Treatment Interventions: ADL retraining, Functional transfer training, UE strengthening/ROM, Endurance training, Patient/family training, Equipment evaluation/education, Compensatory technique education    Subjective   Previous Visit Info:  OT Last Visit  OT Received On: 07/16/24  General:  General  Reason for Referral: pt transferred to the MICU for management of acute on chronic hypoxic respiratory failure in setting of  severe pHTN. repeat CTH no significant change in SDH size, now on T5  Past Medical History Relevant to Rehab: HTN, HLD, CAD , STEMI s/p CABG, diastolic heart failure, congenital heart block s/p pacemeaker, PAH, Afib, CKD stage IV, chronc hypoxic resp failure, COPD (on home 5L NC), LEONEL, OA, chronic spine degen, GERD  Family/Caregiver Present: No  Prior to Session Communication: Bedside nurse  Patient Position Received: Bed, 3 rail up, Alarm off, not on at start of session  Preferred Learning Style: verbal, visual  General Comment: Pt supine in bed upon arrival, agreeable to OT  Precautions:  LE Weight Bearing Status: Weight Bearing as Tolerated  Medical Precautions: Fall precautions, Oxygen therapy device and L/min  Vital Signs:  Vital Signs  Heart Rate:  (79-91 throughout session, RN notified and present in room)  Patient Position: Standing  Pain:  Pain Assessment  Pain Assessment: 0-10  0-10 (Numeric) Pain Score: 9  Pain Type: Acute pain  Pain Location: Leg  Pain Orientation: Left  Pain Interventions: Repositioned, Distraction    Objective    Cognition:  Cognition  Arousal/Alertness: Appropriate responses to stimuli  Orientation Level: Oriented X4  Following Commands: Follows all commands and directions without difficulty  Insight: Within function limits  Impulsive: Within functional limits  Processing Speed: Within funtional limits    Activities of Daily Living:      UE Dressing  UE Dressing Level of Assistance: Moderate assistance  UE Dressing Where Assessed: Edge of bed  UE Dressing Comments: mod A to adjust and tie gown over back mod  A    Bed Mobility/Transfers: Bed Mobility  Bed Mobility: Yes  Bed Mobility 1  Bed Mobility 1: Supine to sitting  Level of Assistance 1: Minimum assistance, Minimal verbal cues  Bed Mobility Comments 1: HOB elevated, extended time to complete  Bed Mobility 2  Bed Mobility  2: Sitting to supine  Level of Assistance 2: Maximum assistance (x2)  Bed Mobility Comments 2: HOB  elevated    Transfers  Transfer: Yes  Transfer 1  Transfer From 1: Sit to, Stand to  Transfer to 1: Stand, Sit  Technique 1: Sit to stand, Stand to sit  Transfer Device 1: Walker  Transfer Level of Assistance 1: Maximum assistance (x2)  Trials/Comments 1: 3 trials, 1st trial CGA unable to complete, trail 2 and 3 max A x2 FWW completed    Functional Mobility:  Functional Mobility  Functional Mobility Performed: No (att L lateral steps standing EOB max A x2, unable to complete 2/2 pain)  Sitting Balance:  Static Sitting Balance  Static Sitting-Balance Support: Feet supported  Static Sitting-Level of Assistance: Close supervision, Contact guard  Dynamic Sitting Balance  Dynamic Sitting-Balance Support: Feet supported  Dynamic Sitting-Comments: CGA  Standing Balance:  Static Standing Balance  Static Standing-Balance Support: Bilateral upper extremity supported  Static Standing-Level of Assistance: Contact guard  Dynamic Standing Balance  Dynamic Standing-Balance Support: Bilateral upper extremity supported  Dynamic Standing-Comments: Max A x2  Modalities:  Modalities Used: No    Therapy/Activity: Therapeutic Exercise  Therapeutic Exercise Performed: Yes  Therapeutic Exercise Activity 1: educated on BUE theraband exercises, completed 1x10 seated EOB shoulder abduction/adduction and elbow flex/extend    Therapeutic Activity  Therapeutic Activity Performed: Yes  Therapeutic Activity 1: bed mob, STS 2x  Therapeutic Activity 2: static/dynamic standing 2x ~2 min CGA- max A x2, att weight shift L/R, unable to complete 2/2 pain, extended seated rest breaks required, skilled monitoring of vitals throughout  Therapeutic Activity 3: seated EOB ~30 min SBA-CGA    Outcome Measures:[facility] Daily Activity  Putting on and taking off regular lower body clothing: Total  Bathing (including washing, rinsing, drying): A lot  Putting on and taking off regular upper body clothing: A little  Toileting, which includes using toilet, bedpan or  urinal: A lot  Taking care of personal grooming such as brushing teeth: A little  Eating Meals: None  Daily Activity - Total Score: 15    Education Documentation  Body Mechanics, taught by Alex Carver OT at 7/16/2024  4:00 PM.  Learner: Patient  Readiness: Acceptance  Method: Explanation  Response: Verbalizes Understanding    Precautions, taught by Alex Carver OT at 7/16/2024  4:00 PM.  Learner: Patient  Readiness: Acceptance  Method: Explanation  Response: Verbalizes Understanding    ADL Training, taught by Alex Carver OT at 7/16/2024  4:00 PM.  Learner: Patient  Readiness: Acceptance  Method: Explanation  Response: Verbalizes Understanding    Education Comments  No comments found.      Goals:  Encounter Problems       Encounter Problems (Active)       ADLs       Patient will complete lower body dressing with MIN A for donning and doffing all LE clothes in order to increase Indep with task participation.  (Progressing)       Start:  07/05/24    Expected End:  07/19/24            Patient will complete toileting, including clothing management and hygiene, with MIN A in order to maximize functional Indep with task completion.  (Progressing)       Start:  07/05/24    Expected End:  07/19/24               BALANCE       Pt will increase static/dynamic stand to Good to increase safety and indep with functional task completion.   (Progressing)       Start:  07/05/24    Expected End:  07/19/24               EXERCISE/STRENGTHENING       Pt will increase overall BUE strength to 4+/5 in order to increase functional task participation.  (Progressing)       Start:  07/05/24    Expected End:  07/19/24            Pt will increase endurance to tolerate 15-20min of activity with no more than 1 rest break in order to increase ability to engage in ADL completion.  (Progressing)       Start:  07/05/24    Expected End:  07/19/24               MOBILITY       Pt will demo increased functional mobility to tolerate tasks necessary  to complete ADL routine with SBA and LRD. (Progressing)       Start:  07/05/24    Expected End:  07/19/24               TRANSFERS       Patient will complete functional transfers using least restrictive device with SBA  in order to maximize functional potential and increase safety.  (Progressing)       Start:  07/05/24    Expected End:  07/19/24                 Alex Carver OTR/DAVID

## 2024-07-16 NOTE — PROGRESS NOTES
"Bhargavi Isidro is a 74 y.o. female on day 12 of admission presenting with SDH (subdural hematoma) (Multi).    Subjective   - Afebrile, NAEON  - Pt resting comfortably at baseline 6L   - Denies any SOB, chest pain, dizziness  -Patient endorsed some eye burning/tearing. Lubricating drops/compresses have been helpful.  -Pending [facility] transfer       Objective   Physical Exam  CONSTITUTIONAL: NAD, alert, and cooperative  SKIN: Ecchymoses on L face and all four extremities  RESPIRATORY/THORAX: Lungs CTA bilat, unlabored breathing, and good air movement  CARDIOVASCULAR: RRR, no murmurs, clicks, rubs, gallops appreciated    ABDOMEN: Abdomen is soft, non-distended, non-tender  EXTREMITIES: B/l LE edema (L >R)   NEUROLOGICAL: Awake, alert, oriented times three. Mentation lucid, speech clear, no focal deficits.  PSYCH: Speech fluent and not pressured. No tangential thought or hallucination, no psychomotor agitation or retardation. Good eye contact, appropriate affect. Insight normal    Last Recorded Vitals  Blood pressure 106/61, pulse 88, temperature 36.7 °C (98.1 °F), temperature source Temporal, resp. rate 20, height 1.651 m (5' 5\"), weight 65.9 kg (145 lb 4.5 oz), SpO2 96%.  Intake/Output last 3 Shifts:  I/O last 3 completed shifts:  In: 761.8 (11.6 mL/kg) [P.O.:600; I.V.:161.8 (2.5 mL/kg)]  Out: 1425 (21.6 mL/kg) [Urine:1425 (0.6 mL/kg/hr)]  Weight: 65.9 kg       Assessment/Plan   Ms. Bhargavi Isidro is a 74yoF with PMHx of Afib (s/p PPM), CAD s/p CABG, COPD, chronic hypoxic respiratory failure (5-6L at rest, 10-15L exertion) 2/2 group I/II/III pHTN (on remodulin, sildenafil) who originally [place] [facility] [place] after a presumed mechanical fall where she was found down at home. On admission found to have R SDH & L inferior pubic rami fracture c/b hematoma formation, s/p eliquis reversal with andexxa. No operative interventions following consultation by IR, Ortho, Neurosurgery. While in TICU pt was intermittently hypotension " and receiving maintenance and bolus IVF, and subsequently had increasing O2 requirement to 15L NC at rest and therefore was transferred to MICU 7/5. Pt continued to be hypotensive in MICU & was started on levo (7/6 - 7/7) and dopamine (7/7 - 7/9) with c/f cardiogenic shock, sildenafil was held. Pt briefly on a lasix gtt 7/8 & then intermittently bolused until back on home 6L NC on 7/10. Pt's course c/b AMS (unclear etiology, repeat CTH w/ no change in SDH, but now mental status improved), DEBORA on CKD (pre-renal likely ischemic ATN I/s/o hypotension), and c/f UTI. Pt now stable for transfer to the floor 7/10.     For follow up:   [ ] Home meds being held: buspar (for DEBORA), torsemide changed to 20mg daily from 40 BID, Kcl 40mEq TID, sildenafil changed TID to BID inpt, mg oxide,   [ ] NSGY will discuss restarting eliquis at follow up appointment 7/23  [ ] CTH 2 wks post bleed, scheduled 7/22  [ ] Confirm new remodulin dose prior to discharge  [ ] Continue keppra until outpatient follow up with neurosurgery 7/23    #HFpEF  #Right sided heart failure  #Volume overload  #Cardiogenic shock? [RESOLVED]  - TTE (6/3/2024): LVEF of 55-60%, dilated RV with low normal RV systolic function and severe tricuspid regurgiation  - TTE (04Jul24): LVEF of 73%, with LV remodeling, Severely enlarged RV, Severely dilated RA, Severe Tricuspid regurgitation   - RV failure likely secondary to underlying pulmonary hypertension  - Patient has received continuous drips of LR and D10W and has received intermittent boluses since being admitted  - On levo (7/6 - 7/7) and dopamine (7/7 - 7/9) with c/f cardiogenic shock  - Briefly on a lasix gtt 7/8 & then intermittently bolused until back on home 6L NC on 7/10  Plan:  - Strict I/Os & daily weights  - Continue torsemide 20mg daily, (home dose: torsemide 40mg BID)  - Daily diuresis plan     #Atrial fibrillation s/p ablation and PPM  - CHADS-VASC of 4, on eliquis  - S/p AV node ablation and PPM  insertion  - Telemetry  - NSGY will discuss restarting eliquis at follow up appointment 7/23    #CAD s/p CABG  - CABG done about 10 years ago  - No heart cath data in her chart  - Continue ASA 81mg     #Pulmonary HTN  - Has been thought to have group I, II, and III pulmonary hypertension at various times  - Most recent RHC (10/2022) significant for RAP 20 PAP 92/45 mPAP 61 PCWP 26 CO/CI (TD) 3.1/1.74 PVR 11.9 PaSat 57. Both pre and post-capillary hypertension  - RV dilation in TTE as above  - Patient prescribed 5-6LNC at rest, 10-15LNC on exertion but patient apparently only uses 6LNC and tolerates saturations in the 70s  - On remodulin infusion & sildenafil  Plan:  - C/w sildenafil 20mg BID (briefly held during admission)  - C/w remodulin dosing modified to remodulin 39ng/kg/min-no more titration-will refer for premixed cassettes   - C/w prednisone 10mg daily. This is a chronic medication for previously suspected organizing pneumonia    #Acute on chronic kidney injury, stage I, oliguric  - Baseline creatinine of around 1.4-1.5  - FENa 0.4% indicating prerenal DEBORA  - Etiology CRS vs ischemic ATN I/s/o hypotension  - Renal u/s: chronic kidney disease no acute findings  Plan:  - Strict I/Os, daily RFPs    # Right sided subdural hematoma  - SDH seen on CT scan following mechanical fall in the setting of Eliquis use for A-fib  - Neurosurgery consulted, no operative management; signed off  - S/p reversal with Andexxa  - Repeat CT Head w/o contrast showed no significant changes in the size of the subdural hematoma  - Had AMS during MICU stay, now resolved  - Restarted dvt ppx on 7/8  - Per NSGY: hold aspirin until post bleed day 7 (7/11), hold eliquis until outpatient follow up 7/23  - Continue home ASA 81mg  - CTH 2 wks post bleed, scheduled 7/22  - Continue Keppra 500mg BID for seizure prophylaxis until outpatient follow up on 7/23    # C/f UTI  - No symptoms of dysuria but symptoms of reduced concentration and  fatigue  - UA showing 11-20 WBC, 25 LE  - Blood cx 7/7: NGTD  - Urine cx 7/7; NGTD  - CTX 1g q24h (7/6 - 7/11)    #Left inferior pelvic ramus fracture  #Pelvic hematoma  - Occurred after fall  - No operative management per Ortho, no need for IR embolization  - Per Ortho, if she fails trial of ambulation she may be a candidate for operative intervention for pelvic pain relief  - Pain regimen: Tylenol 975mg TID, PRN Dilaudid 0.4mg q3h PRN, Lyrica 75mg BID, Robaxin 500mg BID PRN    #Major depression  #Generalized anxiety disorder  PLAN:  - Hold home buspar i/s/o DEBORA  - Continue home lexapro 10  - C/w lamotrigine 100mg daily  - C/w Xanax 0.5mg daily PRN for anxiety    #GOUT  -EGFR <60  -allopurinol 100mg daily (Home dose: 300mg every day)    Diet: 2-3g Na  DVT ppx: SQH  O2: 6L NC  Code status: DNAR/DNI  Surrogate medical decision maker: Jaleesa Rondon (friend) [phone]       Julian Hedrick MD

## 2024-07-16 NOTE — PROGRESS NOTES
Physical Therapy                 Therapy Communication Note    Patient Name: Bhargavi Isidro  MRN: [id number]  Today's Date: 7/16/2024     Discipline: Physical Therapy    Missed Visit Reason: Missed Visit Reason: Parent refused (Pt adamantly refusing PT on this date. Will reattempt at later date as pt tolerates.)    Missed Time: Attempt

## 2024-07-17 VITALS
OXYGEN SATURATION: 91 % | WEIGHT: 146.61 LBS | TEMPERATURE: 97.5 F | SYSTOLIC BLOOD PRESSURE: 101 MMHG | DIASTOLIC BLOOD PRESSURE: 51 MMHG | HEART RATE: 89 BPM | HEIGHT: 65 IN | RESPIRATION RATE: 18 BRPM | BODY MASS INDEX: 24.43 KG/M2

## 2024-07-17 LAB
ALBUMIN SERPL BCP-MCNC: 3.1 G/DL (ref 3.4–5)
ANION GAP SERPL CALC-SCNC: 14 MMOL/L (ref 10–20)
BASOPHILS # BLD MANUAL: 0.18 X10*3/UL (ref 0–0.1)
BASOPHILS NFR BLD MANUAL: 1.7 %
BUN SERPL-MCNC: 25 MG/DL (ref 6–23)
BURR CELLS BLD QL SMEAR: ABNORMAL
CALCIUM SERPL-MCNC: 8.2 MG/DL (ref 8.6–10.6)
CHLORIDE SERPL-SCNC: 100 MMOL/L (ref 98–107)
CO2 SERPL-SCNC: 27 MMOL/L (ref 21–32)
CREAT SERPL-MCNC: 1.07 MG/DL (ref 0.5–1.05)
EGFRCR SERPLBLD CKD-EPI 2021: 55 ML/MIN/1.73M*2
EOSINOPHIL # BLD MANUAL: 0.1 X10*3/UL (ref 0–0.4)
EOSINOPHIL NFR BLD MANUAL: 0.9 %
ERYTHROCYTE [DISTWIDTH] IN BLOOD BY AUTOMATED COUNT: 26.4 % (ref 11.5–14.5)
GLUCOSE SERPL-MCNC: 66 MG/DL (ref 74–99)
HCT VFR BLD AUTO: 31.2 % (ref 36–46)
HGB BLD-MCNC: 9.1 G/DL (ref 12–16)
IMM GRANULOCYTES # BLD AUTO: 0.14 X10*3/UL (ref 0–0.5)
IMM GRANULOCYTES NFR BLD AUTO: 1.3 % (ref 0–0.9)
LYMPHOCYTES # BLD MANUAL: 0.91 X10*3/UL (ref 0.8–3)
LYMPHOCYTES NFR BLD MANUAL: 8.6 %
MAGNESIUM SERPL-MCNC: 1.81 MG/DL (ref 1.6–2.4)
MCH RBC QN AUTO: 24.8 PG (ref 26–34)
MCHC RBC AUTO-ENTMCNC: 29.2 G/DL (ref 32–36)
MCV RBC AUTO: 85 FL (ref 80–100)
MONOCYTES # BLD MANUAL: 0.18 X10*3/UL (ref 0.05–0.8)
MONOCYTES NFR BLD MANUAL: 1.7 %
NEUTS SEG # BLD MANUAL: 9.05 X10*3/UL (ref 1.6–5)
NEUTS SEG NFR BLD MANUAL: 85.4 %
NRBC BLD-RTO: 0.2 /100 WBCS (ref 0–0)
OVALOCYTES BLD QL SMEAR: ABNORMAL
PHOSPHATE SERPL-MCNC: 3.4 MG/DL (ref 2.5–4.9)
PLATELET # BLD AUTO: 273 X10*3/UL (ref 150–450)
POTASSIUM SERPL-SCNC: 4.7 MMOL/L (ref 3.5–5.3)
RBC # BLD AUTO: 3.67 X10*6/UL (ref 4–5.2)
RBC MORPH BLD: ABNORMAL
SCHISTOCYTES BLD QL SMEAR: ABNORMAL
SODIUM SERPL-SCNC: 136 MMOL/L (ref 136–145)
TOTAL CELLS COUNTED BLD: 116
VARIANT LYMPHS # BLD MANUAL: 0.18 X10*3/UL (ref 0–0.3)
VARIANT LYMPHS NFR BLD: 1.7 %
WBC # BLD AUTO: 10.6 X10*3/UL (ref 4.4–11.3)

## 2024-07-17 PROCEDURE — 2500000004 HC RX 250 GENERAL PHARMACY W/ HCPCS (ALT 636 FOR OP/ED): Performed by: STUDENT IN AN ORGANIZED HEALTH CARE EDUCATION/TRAINING PROGRAM

## 2024-07-17 PROCEDURE — 2500000005 HC RX 250 GENERAL PHARMACY W/O HCPCS

## 2024-07-17 PROCEDURE — 80069 RENAL FUNCTION PANEL: CPT

## 2024-07-17 PROCEDURE — 97530 THERAPEUTIC ACTIVITIES: CPT | Mod: GP,CQ

## 2024-07-17 PROCEDURE — 2500000001 HC RX 250 WO HCPCS SELF ADMINISTERED DRUGS (ALT 637 FOR MEDICARE OP)

## 2024-07-17 PROCEDURE — 2500000004 HC RX 250 GENERAL PHARMACY W/ HCPCS (ALT 636 FOR OP/ED)

## 2024-07-17 PROCEDURE — 2500000002 HC RX 250 W HCPCS SELF ADMINISTERED DRUGS (ALT 637 FOR MEDICARE OP, ALT 636 FOR OP/ED)

## 2024-07-17 PROCEDURE — 85027 COMPLETE CBC AUTOMATED: CPT

## 2024-07-17 PROCEDURE — 36415 COLL VENOUS BLD VENIPUNCTURE: CPT

## 2024-07-17 PROCEDURE — 99239 HOSP IP/OBS DSCHRG MGMT >30: CPT

## 2024-07-17 PROCEDURE — 85007 BL SMEAR W/DIFF WBC COUNT: CPT

## 2024-07-17 PROCEDURE — 97116 GAIT TRAINING THERAPY: CPT | Mod: GP,CQ

## 2024-07-17 PROCEDURE — 97530 THERAPEUTIC ACTIVITIES: CPT | Mod: GO

## 2024-07-17 PROCEDURE — 83735 ASSAY OF MAGNESIUM: CPT

## 2024-07-17 RX ORDER — ALLOPURINOL 100 MG/1
100 TABLET ORAL DAILY
Start: 2024-07-17 | End: 2024-08-16

## 2024-07-17 RX ADMIN — LOPERAMIDE HYDROCHLORIDE 2 MG: 2 CAPSULE ORAL at 09:06

## 2024-07-17 RX ADMIN — CARBOXYMETHYLCELLULOSE SODIUM 1 DROP: 5 SOLUTION/ DROPS OPHTHALMIC at 06:18

## 2024-07-17 RX ADMIN — ASPIRIN 81 MG: 81 TABLET, COATED ORAL at 08:58

## 2024-07-17 RX ADMIN — HYDROMORPHONE HYDROCHLORIDE 0.4 MG: 1 INJECTION, SOLUTION INTRAMUSCULAR; INTRAVENOUS; SUBCUTANEOUS at 06:18

## 2024-07-17 RX ADMIN — HYDROMORPHONE HYDROCHLORIDE 0.4 MG: 1 INJECTION, SOLUTION INTRAMUSCULAR; INTRAVENOUS; SUBCUTANEOUS at 09:32

## 2024-07-17 RX ADMIN — ACETAMINOPHEN 975 MG: 325 TABLET ORAL at 08:58

## 2024-07-17 RX ADMIN — PREGABALIN 75 MG: 25 CAPSULE ORAL at 08:58

## 2024-07-17 RX ADMIN — TREPROSTINIL 39 NG/KG/MIN: 20 INJECTION, SOLUTION INTRAVENOUS; SUBCUTANEOUS at 11:02

## 2024-07-17 RX ADMIN — ESCITALOPRAM 10 MG: 10 TABLET, FILM COATED ORAL at 08:58

## 2024-07-17 RX ADMIN — ALLOPURINOL 100 MG: 100 TABLET ORAL at 08:58

## 2024-07-17 RX ADMIN — LEVETIRACETAM 500 MG: 500 TABLET, FILM COATED ORAL at 08:58

## 2024-07-17 RX ADMIN — SILDENAFIL 20 MG: 20 TABLET ORAL at 08:58

## 2024-07-17 RX ADMIN — CARBOXYMETHYLCELLULOSE SODIUM 1 DROP: 5 SOLUTION/ DROPS OPHTHALMIC at 08:59

## 2024-07-17 RX ADMIN — HEPARIN SODIUM 5000 UNITS: 5000 INJECTION INTRAVENOUS; SUBCUTANEOUS at 06:18

## 2024-07-17 RX ADMIN — LAMOTRIGINE 100 MG: 100 TABLET ORAL at 08:58

## 2024-07-17 RX ADMIN — PREDNISONE 10 MG: 20 TABLET ORAL at 08:58

## 2024-07-17 RX ADMIN — TORSEMIDE 20 MG: 20 TABLET ORAL at 08:58

## 2024-07-17 RX ADMIN — PANTOPRAZOLE SODIUM 40 MG: 40 TABLET, DELAYED RELEASE ORAL at 06:18

## 2024-07-17 ASSESSMENT — COGNITIVE AND FUNCTIONAL STATUS - GENERAL
CLIMB 3 TO 5 STEPS WITH RAILING: TOTAL
DRESSING REGULAR LOWER BODY CLOTHING: A LOT
MOBILITY SCORE: 9
HELP NEEDED FOR BATHING: A LOT
TOILETING: A LOT
DRESSING REGULAR UPPER BODY CLOTHING: A LOT
TURNING FROM BACK TO SIDE WHILE IN FLAT BAD: A LOT
WALKING IN HOSPITAL ROOM: TOTAL
DRESSING REGULAR UPPER BODY CLOTHING: A LOT
MOVING FROM LYING ON BACK TO SITTING ON SIDE OF FLAT BED WITH BEDRAILS: A LOT
HELP NEEDED FOR BATHING: A LOT
CLIMB 3 TO 5 STEPS WITH RAILING: TOTAL
MOBILITY SCORE: 10
DAILY ACTIVITIY SCORE: 14
TURNING FROM BACK TO SIDE WHILE IN FLAT BAD: A LOT
PERSONAL GROOMING: A LOT
MOVING FROM LYING ON BACK TO SITTING ON SIDE OF FLAT BED WITH BEDRAILS: A LITTLE
DAILY ACTIVITIY SCORE: 15
STANDING UP FROM CHAIR USING ARMS: A LOT
TOILETING: A LOT
DRESSING REGULAR LOWER BODY CLOTHING: A LOT
WALKING IN HOSPITAL ROOM: TOTAL
MOVING TO AND FROM BED TO CHAIR: TOTAL
MOVING TO AND FROM BED TO CHAIR: A LOT
PERSONAL GROOMING: A LITTLE
STANDING UP FROM CHAIR USING ARMS: TOTAL

## 2024-07-17 ASSESSMENT — PAIN SCALES - GENERAL
PAINLEVEL_OUTOF10: 9
PAINLEVEL_OUTOF10: 10 - WORST POSSIBLE PAIN
PAINLEVEL_OUTOF10: 9

## 2024-07-17 ASSESSMENT — PAIN - FUNCTIONAL ASSESSMENT
PAIN_FUNCTIONAL_ASSESSMENT: 0-10

## 2024-07-17 ASSESSMENT — PAIN DESCRIPTION - LOCATION: LOCATION: HIP

## 2024-07-17 ASSESSMENT — PAIN DESCRIPTION - ORIENTATION: ORIENTATION: LEFT

## 2024-07-17 NOTE — CARE PLAN
Problem: Pain - Adult  Goal: Verbalizes/displays adequate comfort level or baseline comfort level  Outcome: Progressing     Problem: Safety - Adult  Goal: Free from fall injury  Outcome: Progressing     Problem: Discharge Planning  Goal: Discharge to home or other facility with appropriate resources  Outcome: Progressing     Problem: Pain - Adult  Goal: Verbalizes/displays adequate comfort level or baseline comfort level  Outcome: Progressing     Problem: Chronic Conditions and Co-morbidities  Goal: Patient's chronic conditions and co-morbidity symptoms are monitored and maintained or improved  Outcome: Progressing   The patient's goals for the shift include Patient will have improved pain control this shift    The clinical goals for the shift include Patient will have improved pain control this shift.

## 2024-07-17 NOTE — NURSING NOTE
Nursing Discharge Note:  Patient is being transferred to [facility] G91 Bed 25, report called to Juliann at [phone].  Patient switched to Remodulin CADD Pump for transfer.  Abbott and IV #22 Right FA will remain in for transfer.  Patient on 6LHFNA at baseline and has better pain control, medicated for pain as ordered.  Patient taken off of Telemetry and is awaiting  from [facility] at this time.

## 2024-07-17 NOTE — PROGRESS NOTES
Physical Therapy    Physical Therapy Treatment    Patient Name: Bhargavi Isidro  MRN: [id number]  Today's Date: 7/17/2024  Time Calculation  Start Time: 1001  Stop Time: 1026  Time Calculation (min): 25 min    Assessment/Plan   PT Assessment  PT Assessment Results: Decreased strength, Decreased range of motion, Decreased endurance, Impaired balance, Decreased mobility, Pain  Rehab Prognosis: Good  Evaluation/Treatment Tolerance: Patient limited by pain  End of Session Communication: Bedside nurse  Assessment Comment: Pt continues to demonstrate generalized weakness with rapid onset of fatigue. Remains appropriate for continued PT in house and after discharge at MOD intensity to restore functional capacity.  End of Session Patient Position: Bed, 3 rail up, Alarm on  PT Plan  Inpatient/Swing Bed or Outpatient: Inpatient  PT Plan  Treatment/Interventions: Bed mobility, Transfer training, Gait training, Balance training, Strengthening, Endurance training, Therapeutic exercise, Therapeutic activity  PT Plan: Ongoing PT  PT Frequency: 6 times per week  PT Discharge Recommendations: Moderate intensity level of continued care  PT Recommended Transfer Status: Assist x1 (mod assist)  PT - OK to Discharge: Yes      General Visit Information:   PT  Visit  PT Received On: 07/17/24  General  Reason for Referral: pt transferred to the MICU for management of acute on chronic hypoxic respiratory failure in setting of severe pHTN. repeat CTH no significant change in SDH size, now on T5  Past Medical History Relevant to Rehab: HTN, HLD, CAD , STEMI s/p CABG, diastolic heart failure, congenital heart block s/p pacemeaker, PAH, Afib, CKD stage IV, chronc hypoxic resp failure, COPD (on home 5L NC), LEONEL, OA, chronic spine degen, GERD  Co-Treatment: OT  Co-Treatment Reason: To maximize patient safety and mobility  Prior to Session Communication: Bedside nurse  Patient Position Received: Bed, 3 rail up, Alarm off, not on at start of  session  Preferred Learning Style: verbal, visual  General Comment: Pt supine in bed upon arrival, agreeable to OT    Subjective   Precautions:  Precautions  LE Weight Bearing Status: Weight Bearing as Tolerated  Medical Precautions: Fall precautions, Oxygen therapy device and L/min  Vital Signs:  Vital Signs  SpO2:  (79-90% throughout session, O2 increased from 5 to 7L, RN notified and present in room)    Objective   Pain:  Pain Assessment  Pain Assessment: 0-10  0-10 (Numeric) Pain Score: 9  Pain Type: Acute pain, Surgical pain  Pain Location: Hip (wrist)  Pain Orientation: Left  Pain Frequency: Constant/continuous  Cognition:  Cognition  Overall Cognitive Status: Within Functional Limits  Arousal/Alertness: Appropriate responses to stimuli  Orientation Level: Oriented X4  Following Commands: Follows one step commands without difficulty    Postural Control:  Static Standing Balance  Static Standing-Balance Support: Bilateral upper extremity supported (utilizing FWW)  Static Standing-Level of Assistance: Minimum assistance (x2)  Static Standing-Comment/Number of Minutes: 3 minutes (cues provided to put equal weight through each LE)    Activity Tolerance:  Activity Tolerance  Endurance: Tolerates 10 - 20 min exercise with multiple rests  Treatments:       Therapeutic Activity  Therapeutic Activity Performed: Yes  Therapeutic Activity 1: static standing with FWW for 3 minutes with MIN A x2 , declined to att to weight shift to LLE due to increased pain  Therapeutic Activity 2: seated EOB ~ 15 min SBA (cuing provided to remain in upright posture)           Bed Mobility  Bed Mobility: Yes  Bed Mobility 1  Bed Mobility 1: Supine to sitting  Level of Assistance 1: Moderate assistance, Moderate verbal cues  Bed Mobility Comments 1: HOB elevated (Patient required assistance with trunk and LE's during transfer)  Bed Mobility 2  Bed Mobility  2: Sitting to supine  Level of Assistance 2: Maximum assistance, +2, Moderate  tactile cues, Moderate verbal cues  Bed Mobility Comments 2: Patient required assistance with trunk and LE's during transfer  Bed Mobility 3  Bed Mobility 3: Scooting  Level of Assistance 3: Dependent, +2  Bed Mobility Comments 3: boost up in bed    Ambulation/Gait Training  Ambulation/Gait Training Performed: No  Transfers  Transfer: Yes  Transfer 1  Transfer From 1: Sit to, Stand to  Transfer to 1: Stand, Sit  Transfer Device 1: Walker  Transfer Level of Assistance 1: Moderate assistance, +2, Moderate tactile cues, Moderate verbal cues  Trials/Comments 1: required boost and verbal cuing provided for hand placement.    Stairs  Stairs: No       Outcome Measures:  [facility] Basic Mobility  Turning from your back to your side while in a flat bed without using bedrails: A lot  Moving from lying on your back to sitting on the side of a flat bed without using bedrails: A lot  Moving to and from bed to chair (including a wheelchair): A lot  Standing up from a chair using your arms (e.g. wheelchair or bedside chair): A lot  To walk in hospital room: Total  Climbing 3-5 steps with railing: Total  Basic Mobility - Total Score: 10    Encounter Problems       Encounter Problems (Resolved)       Balance       Pt. will score >18 on Tinetti for lower risk of falls (Adequate for Discharge)       Start:  07/05/24    Expected End:  07/19/24    Resolved:  07/17/24            Mobility       Patient will ambulate >25ft. with LRAD CGA.  (Adequate for Discharge)       Start:  07/05/24    Expected End:  07/19/24    Resolved:  07/17/24         Patient will ascend and descend 2 stairs with railing with LRAD MinAx1. (Adequate for Discharge)       Start:  07/05/24    Expected End:  07/19/24    Resolved:  07/17/24            PT Transfers       Patient will perform bed mobility Indep. (Adequate for Discharge)       Start:  07/05/24    Expected End:  07/19/24    Resolved:  07/17/24         Patient will transfer sit to and from stand with LRAD CGA.   (Adequate for Discharge)       Start:  07/05/24    Expected End:  07/19/24    Resolved:  07/17/24            Pain - Adult

## 2024-07-17 NOTE — SIGNIFICANT EVENT
ALDA    07/17/24 1152   Onset Documentation   Rapid Response Initiated By Radar auto page   Pager Time 1152   Arrival Time 1224   Event End Time 1230   Primary Reason for Call Radar auto page     RADAR page auto generated from VS -see documented VS. Alda score 6. Message to bedside nurse- no concerns-pt was working with PT and they had increased her O2 to 7L HF following therapy. No complaints per pt and plan to transfer to [facility] today for further care.  Pt ok to remain on the floor for continued monitoring-and will call with any concerns.

## 2024-07-17 NOTE — PROGRESS NOTES
Occupational Therapy    OT Treatment    Patient Name: Bhargavi Isidro  MRN: [id number]  Today's Date: 7/17/2024  Time Calculation  Start Time: 1004  Stop Time: 1038  Time Calculation (min): 34 min        Assessment:  OT Assessment: Pt will benefit from continued skilled OT to increase indpendence in ADLs, functional mobility, activity tolerance, and safety  Prognosis: Good  Barriers to Discharge: Decreased caregiver support, Inaccessible home environment  Evaluation/Treatment Tolerance: Patient limited by pain  Medical Staff Made Aware: Yes  End of Session Communication: Bedside nurse  End of Session Patient Position: Bed, 3 rail up, Alarm on  OT Assessment Results: Decreased ADL status, Decreased upper extremity strength, Decreased endurance, Decreased functional mobility  Prognosis: Good  Barriers to Discharge: Decreased caregiver support, Inaccessible home environment  Evaluation/Treatment Tolerance: Patient limited by pain  Medical Staff Made Aware: Yes  Strengths: Attitude of self  Barriers to Participation: Comorbidities  Plan:  Treatment Interventions: ADL retraining, Functional transfer training, UE strengthening/ROM, Endurance training, Patient/family training, Equipment evaluation/education, Compensatory technique education  OT Frequency: 4 times per week  OT Discharge Recommendations: Moderate intensity level of continued care  Equipment Recommended upon Discharge: Wheeled walker  OT Recommended Transfer Status: Assist of 2  OT - OK to Discharge: Yes  Treatment Interventions: ADL retraining, Functional transfer training, UE strengthening/ROM, Endurance training, Patient/family training, Equipment evaluation/education, Compensatory technique education    Subjective   Previous Visit Info:  OT Last Visit  OT Received On: 07/17/24  General:  General  Reason for Referral: pt transferred to the MICU for management of acute on chronic hypoxic respiratory failure in setting of severe pHTN. repeat CTH no significant  change in SDH size, now on T5  Past Medical History Relevant to Rehab: HTN, HLD, CAD , STEMI s/p CABG, diastolic heart failure, congenital heart block s/p pacemeaker, PAH, Afib, CKD stage IV, chronc hypoxic resp failure, COPD (on home 5L NC), LEONEL, OA, chronic spine degen, GERD  Family/Caregiver Present: No  Co-Treatment: PT  Co-Treatment Reason: To maximize patient safety and mobility  Prior to Session Communication: Bedside nurse  Patient Position Received: Bed, 3 rail up, Alarm off, not on at start of session  Preferred Learning Style: verbal, visual  General Comment: Pt supine in bed upon arrival, agreeable to OT  Precautions:  LE Weight Bearing Status: Weight Bearing as Tolerated  Medical Precautions: Fall precautions, Oxygen therapy device and L/min  Vital Signs:  Vital Signs  Heart Rate:  (79-91 throughout session, RN notified and present in room)  SpO2:  (79-90% throughout session, O2 increased from 5 to 7L, RN notified and present in room)  Patient Position: Standing  Pain:  Pain Assessment  Pain Assessment: 0-10  0-10 (Numeric) Pain Score: 9  Pain Type: Acute pain, Surgical pain  Pain Location: Hip (wrist)  Pain Orientation: Left  Pain Interventions: Repositioned, Distraction    Objective    Cognition:  Cognition  Overall Cognitive Status: Within Functional Limits  Arousal/Alertness: Appropriate responses to stimuli  Orientation Level: Oriented X4  Following Commands: Follows all commands and directions without difficulty  Insight: Within function limits  Impulsive: Within functional limits  Processing Speed: Within funtional limits    Activities of Daily Living:      UE Dressing  UE Dressing Level of Assistance: Moderate assistance  UE Dressing Where Assessed: Edge of bed  UE Dressing Comments: mod A to tie gown over back seated EOB    Bed Mobility/Transfers: Bed Mobility  Bed Mobility: Yes  Bed Mobility 1  Bed Mobility 1: Supine to sitting  Level of Assistance 1: Moderate assistance, Moderate verbal  cues  Bed Mobility Comments 1: HOB elevated  Bed Mobility 2  Bed Mobility  2: Sitting to supine  Level of Assistance 2: Maximum assistance (x2)  Bed Mobility Comments 2: HOB elevated  Bed Mobility 3  Bed Mobility 3: Scooting  Level of Assistance 3: Dependent (x2)  Bed Mobility Comments 3: boost up in bed    Transfers  Transfer: Yes  Transfer 1  Transfer From 1: Sit to, Stand to  Transfer to 1: Stand, Sit  Technique 1: Sit to stand, Stand to sit  Transfer Device 1: Walker  Transfer Level of Assistance 1: Moderate assistance, Moderate verbal cues (x2)  Trials/Comments 1: VCs for hand placement    Functional Mobility:  Functional Mobility  Functional Mobility Performed: No  Sitting Balance:  Static Sitting Balance  Static Sitting-Balance Support: Feet supported  Static Sitting-Level of Assistance: Close supervision  Dynamic Sitting Balance  Dynamic Sitting-Balance Support: Feet supported  Dynamic Sitting-Comments: SBA  Standing Balance:  Static Standing Balance  Static Standing-Balance Support: Bilateral upper extremity supported  Static Standing-Level of Assistance: Contact guard  Static Standing-Comment/Number of Minutes: min A  Dynamic Standing Balance  Dynamic Standing-Balance Support: Bilateral upper extremity supported  Dynamic Standing-Comments: Max A x2  Modalities:  Modalities Used: No    Casting:     Therapy/Activity: Therapeutic Exercise  Therapeutic Exercise Performed: Yes  Therapeutic Exercise Activity 1: educated on BUE theraband exercises, completed 1x10 seated EOB shoulder abduction/adduction and elbow flex/extend    Therapeutic Activity  Therapeutic Activity Performed: Yes  Therapeutic Activity 1: educated on use of sponges and theraband  Therapeutic Activity 2: bed mob, STS, static standing ~3 min min A FWW, denied att to weight shift to LLE 2/2 increased pain  Therapeutic Activity 3: seated EOB ~ 15 min SBA    Outcome Measures:[facility] Daily Activity  Putting on and taking off regular lower body  clothing: A lot  Bathing (including washing, rinsing, drying): A lot  Putting on and taking off regular upper body clothing: A lot  Toileting, which includes using toilet, bedpan or urinal: A lot  Taking care of personal grooming such as brushing teeth: A little  Eating Meals: None  Daily Activity - Total Score: 15    Education Documentation  Body Mechanics, taught by Alex Carver OT at 7/17/2024 11:36 AM.  Learner: Patient  Readiness: Acceptance  Method: Explanation  Response: Verbalizes Understanding    Precautions, taught by Alex Carver OT at 7/17/2024 11:36 AM.  Learner: Patient  Readiness: Acceptance  Method: Explanation  Response: Verbalizes Understanding    ADL Training, taught by Alex Carver OT at 7/17/2024 11:36 AM.  Learner: Patient  Readiness: Acceptance  Method: Explanation  Response: Verbalizes Understanding    Body Mechanics, taught by Alex Carver OT at 7/16/2024  4:00 PM.  Learner: Patient  Readiness: Acceptance  Method: Explanation  Response: Verbalizes Understanding    Precautions, taught by Alex Carver OT at 7/16/2024  4:00 PM.  Learner: Patient  Readiness: Acceptance  Method: Explanation  Response: Verbalizes Understanding    ADL Training, taught by Alex Carver OT at 7/16/2024  4:00 PM.  Learner: Patient  Readiness: Acceptance  Method: Explanation  Response: Verbalizes Understanding    Education Comments  No comments found.      Goals:  Encounter Problems       Encounter Problems (Active)       ADLs       Patient will complete lower body dressing with MIN A for donning and doffing all LE clothes in order to increase Indep with task participation.  (Progressing)       Start:  07/05/24    Expected End:  07/19/24            Patient will complete toileting, including clothing management and hygiene, with MIN A in order to maximize functional Indep with task completion.  (Progressing)       Start:  07/05/24    Expected End:  07/19/24               BALANCE       Pt will increase  static/dynamic stand to Good to increase safety and indep with functional task completion.   (Progressing)       Start:  07/05/24    Expected End:  07/19/24               EXERCISE/STRENGTHENING       Pt will increase overall BUE strength to 4+/5 in order to increase functional task participation.  (Progressing)       Start:  07/05/24    Expected End:  07/19/24            Pt will increase endurance to tolerate 15-20min of activity with no more than 1 rest break in order to increase ability to engage in ADL completion.  (Progressing)       Start:  07/05/24    Expected End:  07/19/24               MOBILITY       Pt will demo increased functional mobility to tolerate tasks necessary to complete ADL routine with SBA and LRD. (Progressing)       Start:  07/05/24    Expected End:  07/19/24               TRANSFERS       Patient will complete functional transfers using least restrictive device with SBA  in order to maximize functional potential and increase safety.  (Progressing)       Start:  07/05/24    Expected End:  07/19/24               Alex Carver OTR/L

## 2024-07-17 NOTE — CARE PLAN
The patient's goals for the shift include Patient will be transferred to [facility] today    The clinical goals for the shift include Patient will have improved pain control this shift.    Over the shift, the patient was switched over to her CADD Pump (Remodulin/47ml/24hours), pateint was medicated for pain as ordered with decrease in pain.  She worked with PT today and tolerated.  At baseline patient is on 6LHFNC and was increased to 7L/HFNC with PT.  Wounds healing nicely.  Patient remained safe free from falls and injury this shift.  Transfer order to [facility] G91 Bed 25 in, report called to Juliann [phone].   time between 12:00 and 12:30.  Scar Saunders and IV#22 in right FA will remain in place for transfer.      Problem: Pain - Adult  Goal: Verbalizes/displays adequate comfort level or baseline comfort level  Outcome: Progressing     Problem: Safety - Adult  Goal: Free from fall injury  Outcome: Progressing     Problem: Discharge Planning  Goal: Discharge to home or other facility with appropriate resources  Outcome: Progressing     Problem: Chronic Conditions and Co-morbidities  Goal: Patient's chronic conditions and co-morbidity symptoms are monitored and maintained or improved  Outcome: Progressing     Problem: Skin  Goal: Decreased wound size/increased tissue granulation at next dressing change  Outcome: Progressing  Flowsheets (Taken 7/17/2024 1154)  Decreased wound size/increased tissue granulation at next dressing change:   Promote sleep for wound healing   Protective dressings over bony prominences  Goal: Participates in plan/prevention/treatment measures  Outcome: Progressing  Flowsheets (Taken 7/17/2024 1154)  Participates in plan/prevention/treatment measures:   Increase activity/out of bed for meals   Elevate heels  Goal: Prevent/manage excess moisture  Outcome: Progressing  Flowsheets (Taken 7/17/2024 1154)  Prevent/manage excess moisture:   Cleanse incontinence/protect with barrier cream   Follow  provider orders for dressing changes   Moisturize dry skin   Monitor for/manage infection if present  Goal: Prevent/minimize sheer/friction injuries  Outcome: Progressing  Flowsheets (Taken 7/17/2024 1154)  Prevent/minimize sheer/friction injuries:   HOB 30 degrees or less   Increase activity/out of bed for meals   Turn/reposition every 2 hours/use positioning/transfer devices   Use pull sheet  Goal: Promote/optimize nutrition  Outcome: Progressing  Flowsheets (Taken 7/17/2024 1154)  Promote/optimize nutrition:   Consume > 50% meals/supplements   Discuss with provider if NPO > 2 days   Monitor/record intake including meals   Offer water/supplements/favorite foods  Goal: Promote skin healing  Outcome: Progressing  Flowsheets (Taken 7/15/2024 2176)  Promote skin healing:   Assess skin/pad under line(s)/device(s)   Ensure correct size (line/device) and apply per  instructions   Protective dressings over bony prominences   Rotate device position/do not position patient on device   Turn/reposition every 2 hours/use positioning/transfer devices     Problem: Pain  Goal: Takes deep breaths with improved pain control throughout the shift  Outcome: Progressing  Goal: Turns in bed with improved pain control throughout the shift  Outcome: Progressing  Goal: Walks with improved pain control throughout the shift  Outcome: Progressing  Goal: Performs ADL's with improved pain control throughout shift  Outcome: Progressing  Goal: Participates in PT with improved pain control throughout the shift  Outcome: Progressing  Goal: Free from opioid side effects throughout the shift  Outcome: Progressing  Goal: Free from acute confusion related to pain meds throughout the shift  Outcome: Progressing     Problem: Fall/Injury  Goal: Not fall by end of shift  Outcome: Progressing  Goal: Be free from injury by end of the shift  Outcome: Progressing  Goal: Verbalize understanding of personal risk factors for fall in the  hospital  Outcome: Progressing  Goal: Verbalize understanding of risk factor reduction measures to prevent injury from fall in the home  Outcome: Progressing  Goal: Use assistive devices by end of the shift  Outcome: Progressing  Goal: Pace activities to prevent fatigue by end of the shift  Outcome: Progressing

## 2024-07-23 ENCOUNTER — APPOINTMENT (OUTPATIENT)
Dept: ORTHOPEDIC SURGERY | Facility: HOSPITAL | Age: 74
End: 2024-07-23
Payer: MEDICARE

## 2024-09-19 NOTE — PROGRESS NOTES
Patient  to discharge going to F G91  [facility] Bed 25,  at 1 pm being transported ALS by [facility]        regular

## 2025-07-09 NOTE — CARE PLAN
ACTIVITY  There are no restrictions in activity. Start doing again the things you did before the procedure.  You may experience a slight burning sensation. You may notice a small amount of blood in your urine. This will clear up within a day. Call the clinic if this continues beyond 48 hours.     DIET  Continue your normal diet. You may eat the same foods you ate before your procedure.  Drink plenty of fluids during the first 24-48 hours following your procedure.     MEDICATIONS  Resume all other previous medications from your prescribing physician.  Continue any pre-procedure antibiotics until they are all gone.     SIGNS AND SYMPTOMS TO REPORT TO THE DOCTOR  Chills or fever greater than 101° F within 24 hours of procedure.  Changes in urination, such as increased bleeding, foul smell, cloudy urine, or painful urination.  Call your doctor with any questions or concerns.     For any emergency situation, call 911 immediately or go to your nearest emergency room.       The patient's goals for the shift include  to have less than 5/10 pain in left leg with pain medication.    The clinical goals for the shift include Patient will have improved pain control this shift.    Over the shift, the patient did work with PT and OT, patient medicated for pain as ordered with some a reduction in pain.  Patient continues on Remodulin, potassium and Magnesium repleted.  Potassium Phos running at present time.  Patient will be here until she is able to do ADL's due to Remodulin.  Patient remained safe free from falls and injury this shift.      Problem: Pain - Adult  Goal: Verbalizes/displays adequate comfort level or baseline comfort level  Outcome: Progressing     Problem: Safety - Adult  Goal: Free from fall injury  Outcome: Progressing     Problem: Discharge Planning  Goal: Discharge to home or other facility with appropriate resources  Outcome: Progressing     Problem: Chronic Conditions and Co-morbidities  Goal: Patient's chronic conditions and co-morbidity symptoms are monitored and maintained or improved  Outcome: Progressing     Problem: Skin  Goal: Decreased wound size/increased tissue granulation at next dressing change  Outcome: Progressing  Flowsheets (Taken 7/15/2024 1838)  Decreased wound size/increased tissue granulation at next dressing change:   Protective dressings over bony prominences   Promote sleep for wound healing  Goal: Participates in plan/prevention/treatment measures  Outcome: Progressing  Flowsheets (Taken 7/15/2024 1838)  Participates in plan/prevention/treatment measures:   Discuss with provider PT/OT consult   Elevate heels   Increase activity/out of bed for meals  Goal: Prevent/manage excess moisture  Outcome: Progressing  Flowsheets (Taken 7/15/2024 1838)  Prevent/manage excess moisture:   Cleanse incontinence/protect with barrier cream   Follow provider orders for dressing changes   Moisturize dry skin   Monitor for/manage infection if present  Goal:  Prevent/minimize sheer/friction injuries  Outcome: Progressing  Flowsheets (Taken 7/15/2024 1838)  Prevent/minimize sheer/friction injuries:   HOB 30 degrees or less   Increase activity/out of bed for meals   Turn/reposition every 2 hours/use positioning/transfer devices  Goal: Promote/optimize nutrition  Outcome: Progressing  Flowsheets (Taken 7/15/2024 1838)  Promote/optimize nutrition:   Consume > 50% meals/supplements   Monitor/record intake including meals   Offer water/supplements/favorite foods  Goal: Promote skin healing  Outcome: Progressing  Flowsheets (Taken 7/15/2024 1838)  Promote skin healing:   Assess skin/pad under line(s)/device(s)   Ensure correct size (line/device) and apply per  instructions   Protective dressings over bony prominences   Rotate device position/do not position patient on device   Turn/reposition every 2 hours/use positioning/transfer devices     Problem: Pain  Goal: Takes deep breaths with improved pain control throughout the shift  Outcome: Progressing  Goal: Turns in bed with improved pain control throughout the shift  Outcome: Progressing  Goal: Walks with improved pain control throughout the shift  Outcome: Progressing  Goal: Performs ADL's with improved pain control throughout shift  Outcome: Progressing  Goal: Participates in PT with improved pain control throughout the shift  Outcome: Progressing  Goal: Free from opioid side effects throughout the shift  Outcome: Progressing  Goal: Free from acute confusion related to pain meds throughout the shift  Outcome: Progressing     Problem: Fall/Injury  Goal: Not fall by end of shift  Outcome: Progressing  Goal: Be free from injury by end of the shift  Outcome: Progressing  Goal: Verbalize understanding of personal risk factors for fall in the hospital  Outcome: Progressing  Goal: Verbalize understanding of risk factor reduction measures to prevent injury from fall in the home  Outcome: Progressing  Goal: Use assistive  devices by end of the shift  Outcome: Progressing  Goal: Pace activities to prevent fatigue by end of the shift  Outcome: Progressing